# Patient Record
Sex: FEMALE | Race: WHITE | NOT HISPANIC OR LATINO
[De-identification: names, ages, dates, MRNs, and addresses within clinical notes are randomized per-mention and may not be internally consistent; named-entity substitution may affect disease eponyms.]

---

## 2017-02-09 ENCOUNTER — TRANSCRIPTION ENCOUNTER (OUTPATIENT)
Age: 72
End: 2017-02-09

## 2017-08-07 ENCOUNTER — TRANSCRIPTION ENCOUNTER (OUTPATIENT)
Age: 72
End: 2017-08-07

## 2017-11-19 ENCOUNTER — TRANSCRIPTION ENCOUNTER (OUTPATIENT)
Age: 72
End: 2017-11-19

## 2018-04-16 ENCOUNTER — TRANSCRIPTION ENCOUNTER (OUTPATIENT)
Age: 73
End: 2018-04-16

## 2018-04-24 ENCOUNTER — APPOINTMENT (OUTPATIENT)
Dept: UROLOGY | Facility: CLINIC | Age: 73
End: 2018-04-24
Payer: MEDICARE

## 2018-04-24 VITALS
TEMPERATURE: 98.7 F | WEIGHT: 290 LBS | SYSTOLIC BLOOD PRESSURE: 152 MMHG | BODY MASS INDEX: 45.52 KG/M2 | HEART RATE: 70 BPM | DIASTOLIC BLOOD PRESSURE: 77 MMHG | HEIGHT: 67 IN | OXYGEN SATURATION: 95 %

## 2018-04-24 PROCEDURE — 99204 OFFICE O/P NEW MOD 45 MIN: CPT

## 2018-05-03 ENCOUNTER — FORM ENCOUNTER (OUTPATIENT)
Age: 73
End: 2018-05-03

## 2018-05-04 ENCOUNTER — OUTPATIENT (OUTPATIENT)
Dept: OUTPATIENT SERVICES | Facility: HOSPITAL | Age: 73
LOS: 1 days | End: 2018-05-04
Payer: MEDICARE

## 2018-05-04 ENCOUNTER — APPOINTMENT (OUTPATIENT)
Dept: CT IMAGING | Facility: CLINIC | Age: 73
End: 2018-05-04
Payer: MEDICARE

## 2018-05-04 DIAGNOSIS — Z00.8 ENCOUNTER FOR OTHER GENERAL EXAMINATION: ICD-10-CM

## 2018-05-04 PROCEDURE — 74178 CT ABD&PLV WO CNTR FLWD CNTR: CPT

## 2018-05-04 PROCEDURE — 74178 CT ABD&PLV WO CNTR FLWD CNTR: CPT | Mod: 26

## 2018-05-14 ENCOUNTER — APPOINTMENT (OUTPATIENT)
Dept: UROLOGY | Facility: CLINIC | Age: 73
End: 2018-05-14
Payer: MEDICARE

## 2018-05-14 VITALS — SYSTOLIC BLOOD PRESSURE: 156 MMHG | DIASTOLIC BLOOD PRESSURE: 74 MMHG

## 2018-05-14 DIAGNOSIS — N32.9 BLADDER DISORDER, UNSPECIFIED: ICD-10-CM

## 2018-05-14 PROCEDURE — 52224 CYSTOSCOPY AND TREATMENT: CPT

## 2018-05-14 PROCEDURE — 81003 URINALYSIS AUTO W/O SCOPE: CPT | Mod: QW

## 2018-05-14 PROCEDURE — 99213 OFFICE O/P EST LOW 20 MIN: CPT | Mod: 25

## 2018-05-15 LAB
BILIRUB UR QL STRIP: NEGATIVE
CLARITY UR: CLEAR
COLLECTION METHOD: NORMAL
GLUCOSE UR-MCNC: NEGATIVE
HCG UR QL: 0.2 EU/DL
HGB UR QL STRIP.AUTO: ABNORMAL
KETONES UR-MCNC: NEGATIVE
LEUKOCYTE ESTERASE UR QL STRIP: NEGATIVE
NITRITE UR QL STRIP: NEGATIVE
PH UR STRIP: 5
PROT UR STRIP-MCNC: NEGATIVE
SP GR UR STRIP: <=1.005

## 2018-05-16 ENCOUNTER — APPOINTMENT (OUTPATIENT)
Dept: UROLOGY | Facility: CLINIC | Age: 73
End: 2018-05-16
Payer: MEDICARE

## 2018-05-16 VITALS — DIASTOLIC BLOOD PRESSURE: 69 MMHG | OXYGEN SATURATION: 95 % | SYSTOLIC BLOOD PRESSURE: 160 MMHG | TEMPERATURE: 98.2 F

## 2018-05-16 PROCEDURE — 99214 OFFICE O/P EST MOD 30 MIN: CPT

## 2018-05-19 LAB — CORE LAB BIOPSY: NORMAL

## 2018-05-21 ENCOUNTER — APPOINTMENT (OUTPATIENT)
Dept: UROLOGY | Facility: CLINIC | Age: 73
End: 2018-05-21
Payer: MEDICARE

## 2018-05-21 VITALS
BODY MASS INDEX: 43.79 KG/M2 | HEART RATE: 80 BPM | TEMPERATURE: 98.3 F | DIASTOLIC BLOOD PRESSURE: 82 MMHG | WEIGHT: 279 LBS | SYSTOLIC BLOOD PRESSURE: 136 MMHG | HEIGHT: 67 IN

## 2018-05-21 PROCEDURE — 51784 ANAL/URINARY MUSCLE STUDY: CPT

## 2018-05-21 PROCEDURE — 51797 INTRAABDOMINAL PRESSURE TEST: CPT

## 2018-05-21 PROCEDURE — 51741 ELECTRO-UROFLOWMETRY FIRST: CPT

## 2018-05-21 PROCEDURE — 51798 US URINE CAPACITY MEASURE: CPT | Mod: 59

## 2018-05-21 PROCEDURE — 51728 CYSTOMETROGRAM W/VP: CPT

## 2018-05-29 ENCOUNTER — OTHER (OUTPATIENT)
Age: 73
End: 2018-05-29

## 2018-06-05 ENCOUNTER — OUTPATIENT (OUTPATIENT)
Dept: OUTPATIENT SERVICES | Facility: HOSPITAL | Age: 73
LOS: 1 days | Discharge: HOME | End: 2018-06-05
Payer: COMMERCIAL

## 2018-06-05 DIAGNOSIS — N32.9 BLADDER DISORDER, UNSPECIFIED: ICD-10-CM

## 2018-06-05 PROCEDURE — 78707 K FLOW/FUNCT IMAGE W/O DRUG: CPT | Mod: 26

## 2018-06-05 RX ORDER — SODIUM CHLORIDE 9 MG/ML
999 INJECTION INTRAMUSCULAR; INTRAVENOUS; SUBCUTANEOUS ONCE
Qty: 0 | Refills: 0 | Status: COMPLETED | OUTPATIENT
Start: 2018-06-05 | End: 2018-06-05

## 2018-06-05 RX ADMIN — SODIUM CHLORIDE 999 MILLILITER(S): 9 INJECTION INTRAMUSCULAR; INTRAVENOUS; SUBCUTANEOUS at 10:45

## 2018-06-07 ENCOUNTER — OUTPATIENT (OUTPATIENT)
Dept: OUTPATIENT SERVICES | Facility: HOSPITAL | Age: 73
LOS: 1 days | Discharge: HOME | End: 2018-06-07
Payer: COMMERCIAL

## 2018-06-07 VITALS
DIASTOLIC BLOOD PRESSURE: 73 MMHG | WEIGHT: 272.05 LBS | SYSTOLIC BLOOD PRESSURE: 130 MMHG | RESPIRATION RATE: 15 BRPM | HEART RATE: 84 BPM | HEIGHT: 66.5 IN | TEMPERATURE: 98 F | OXYGEN SATURATION: 98 %

## 2018-06-07 DIAGNOSIS — N28.89 OTHER SPECIFIED DISORDERS OF KIDNEY AND URETER: ICD-10-CM

## 2018-06-07 DIAGNOSIS — Z96.641 PRESENCE OF RIGHT ARTIFICIAL HIP JOINT: Chronic | ICD-10-CM

## 2018-06-07 DIAGNOSIS — Z98.890 OTHER SPECIFIED POSTPROCEDURAL STATES: Chronic | ICD-10-CM

## 2018-06-07 LAB
ANION GAP SERPL CALC-SCNC: 7 MMOL/L — SIGNIFICANT CHANGE UP (ref 5–17)
APPEARANCE UR: CLEAR — SIGNIFICANT CHANGE UP
APTT BLD: 30.5 SEC — SIGNIFICANT CHANGE UP (ref 27.5–37.4)
BASOPHILS # BLD AUTO: 0.04 K/UL — SIGNIFICANT CHANGE UP (ref 0–0.2)
BASOPHILS NFR BLD AUTO: 0.4 % — SIGNIFICANT CHANGE UP (ref 0–2)
BILIRUB UR-MCNC: NEGATIVE — SIGNIFICANT CHANGE UP
BUN SERPL-MCNC: 30 MG/DL — HIGH (ref 7–23)
CALCIUM SERPL-MCNC: 9 MG/DL — SIGNIFICANT CHANGE UP (ref 8.5–10.1)
CHLORIDE SERPL-SCNC: 106 MMOL/L — SIGNIFICANT CHANGE UP (ref 96–108)
CO2 SERPL-SCNC: 26 MMOL/L — SIGNIFICANT CHANGE UP (ref 22–31)
COLOR SPEC: YELLOW — SIGNIFICANT CHANGE UP
CREAT SERPL-MCNC: 1.13 MG/DL — SIGNIFICANT CHANGE UP (ref 0.5–1.3)
DIFF PNL FLD: ABNORMAL
EOSINOPHIL # BLD AUTO: 0.16 K/UL — SIGNIFICANT CHANGE UP (ref 0–0.5)
EOSINOPHIL NFR BLD AUTO: 1.5 % — SIGNIFICANT CHANGE UP (ref 0–6)
GLUCOSE SERPL-MCNC: 115 MG/DL — HIGH (ref 70–99)
GLUCOSE UR QL: NEGATIVE MG/DL — SIGNIFICANT CHANGE UP
HCT VFR BLD CALC: 37.1 % — SIGNIFICANT CHANGE UP (ref 34.5–45)
HGB BLD-MCNC: 12.5 G/DL — SIGNIFICANT CHANGE UP (ref 11.5–15.5)
IMM GRANULOCYTES NFR BLD AUTO: 0.5 % — SIGNIFICANT CHANGE UP (ref 0–1.5)
INR BLD: 1.13 RATIO — SIGNIFICANT CHANGE UP (ref 0.88–1.16)
KETONES UR-MCNC: NEGATIVE — SIGNIFICANT CHANGE UP
LEUKOCYTE ESTERASE UR-ACNC: ABNORMAL
LYMPHOCYTES # BLD AUTO: 2.83 K/UL — SIGNIFICANT CHANGE UP (ref 1–3.3)
LYMPHOCYTES # BLD AUTO: 26.9 % — SIGNIFICANT CHANGE UP (ref 13–44)
MCHC RBC-ENTMCNC: 28.7 PG — SIGNIFICANT CHANGE UP (ref 27–34)
MCHC RBC-ENTMCNC: 33.7 GM/DL — SIGNIFICANT CHANGE UP (ref 32–36)
MCV RBC AUTO: 85.1 FL — SIGNIFICANT CHANGE UP (ref 80–100)
MONOCYTES # BLD AUTO: 0.59 K/UL — SIGNIFICANT CHANGE UP (ref 0–0.9)
MONOCYTES NFR BLD AUTO: 5.6 % — SIGNIFICANT CHANGE UP (ref 2–14)
NEUTROPHILS # BLD AUTO: 6.87 K/UL — SIGNIFICANT CHANGE UP (ref 1.8–7.4)
NEUTROPHILS NFR BLD AUTO: 65.1 % — SIGNIFICANT CHANGE UP (ref 43–77)
NITRITE UR-MCNC: NEGATIVE — SIGNIFICANT CHANGE UP
PH UR: 5 — SIGNIFICANT CHANGE UP (ref 5–8)
PLATELET # BLD AUTO: 303 K/UL — SIGNIFICANT CHANGE UP (ref 150–400)
POTASSIUM SERPL-MCNC: 3.7 MMOL/L — SIGNIFICANT CHANGE UP (ref 3.5–5.3)
POTASSIUM SERPL-SCNC: 3.7 MMOL/L — SIGNIFICANT CHANGE UP (ref 3.5–5.3)
PROT UR-MCNC: 15 MG/DL
PROTHROM AB SERPL-ACNC: 12.2 SEC — SIGNIFICANT CHANGE UP (ref 9.8–12.7)
RBC # BLD: 4.36 M/UL — SIGNIFICANT CHANGE UP (ref 3.8–5.2)
RBC # FLD: 14.2 % — SIGNIFICANT CHANGE UP (ref 10.3–14.5)
SODIUM SERPL-SCNC: 139 MMOL/L — SIGNIFICANT CHANGE UP (ref 135–145)
SP GR SPEC: 1.02 — SIGNIFICANT CHANGE UP (ref 1.01–1.02)
TYPE + AB SCN PNL BLD: SIGNIFICANT CHANGE UP
UROBILINOGEN FLD QL: NEGATIVE MG/DL — SIGNIFICANT CHANGE UP
WBC # BLD: 10.54 K/UL — HIGH (ref 3.8–10.5)
WBC # FLD AUTO: 10.54 K/UL — HIGH (ref 3.8–10.5)

## 2018-06-07 PROCEDURE — 93010 ELECTROCARDIOGRAM REPORT: CPT

## 2018-06-07 PROCEDURE — 71046 X-RAY EXAM CHEST 2 VIEWS: CPT | Mod: 26

## 2018-06-07 RX ORDER — CHOLECALCIFEROL (VITAMIN D3) 125 MCG
0 CAPSULE ORAL
Qty: 0 | Refills: 0 | COMMUNITY

## 2018-06-07 NOTE — H&P PST ADULT - FAMILY HISTORY
Mother  Still living? Yes, Estimated age:   Family history of cardiac pacemaker, Age at diagnosis: Age Unknown     Father  Still living? No  Family history of prostate cancer in father, Age at diagnosis: Age Unknown     Child  Still living? Yes, Estimated age: 41-50  Family history of cardiomyopathy, Age at diagnosis: Age Unknown

## 2018-06-07 NOTE — H&P PST ADULT - RESPIRATORY AND THORAX COMMENTS
Followed by Dr Guan for sleep apnea. States she needs to be reevaluated because she stopped using CPAP.

## 2018-06-07 NOTE — H&P PST ADULT - PMH
Arrhythmia  "irregular heartbeat" not sure of specific diagnosis  Essential hypertension    Hematuria    Kidney mass  left side  Obesity    Pure hypercholesterolemia    Sleep apnea  has CPAP, stopped using it  Type 2 diabetes mellitus

## 2018-06-07 NOTE — H&P PST ADULT - ASSESSMENT
yo scheduled for   with    on     1. Labs as per surgeon  2. EKG  3. Medical clearance with  4. discussed EZ sponges & day of procedure instructions 73 yo female scheduled for left nephrectomy on 6/14/18  with Dr. Rainey    1. Labs as per surgeon  2. EKG  3. Medical clearance @ Dr. Parsons's office  4. discussed EZ sponges & day of procedure instructions  5. CXR

## 2018-06-07 NOTE — H&P PST ADULT - HISTORY OF PRESENT ILLNESS
71 yo female presents to PST. Reports episode of gross hematuria in April 2018. Consulted with Dr Zhou & was sent for CT scan that showed mass on left kidney. Was then referred to DR Rainey who recommended surgery. Pt states "he's almost sure its cancer".  Denies back/flank pain, dysuria or hematuria at this time.

## 2018-06-08 LAB
CULTURE RESULTS: NO GROWTH — SIGNIFICANT CHANGE UP
SPECIMEN SOURCE: SIGNIFICANT CHANGE UP

## 2018-06-13 RX ORDER — FENTANYL CITRATE 50 UG/ML
50 INJECTION INTRAVENOUS
Qty: 0 | Refills: 0 | Status: DISCONTINUED | OUTPATIENT
Start: 2018-06-14 | End: 2018-06-14

## 2018-06-14 ENCOUNTER — RESULT REVIEW (OUTPATIENT)
Age: 73
End: 2018-06-14

## 2018-06-14 ENCOUNTER — INPATIENT (INPATIENT)
Facility: HOSPITAL | Age: 73
LOS: 1 days | Discharge: HOME | End: 2018-06-16
Attending: UROLOGY | Admitting: UROLOGY
Payer: COMMERCIAL

## 2018-06-14 ENCOUNTER — APPOINTMENT (OUTPATIENT)
Dept: UROLOGY | Facility: HOSPITAL | Age: 73
End: 2018-06-14

## 2018-06-14 VITALS
TEMPERATURE: 99 F | RESPIRATION RATE: 16 BRPM | WEIGHT: 272.05 LBS | HEIGHT: 67 IN | OXYGEN SATURATION: 97 % | HEART RATE: 81 BPM | SYSTOLIC BLOOD PRESSURE: 146 MMHG | DIASTOLIC BLOOD PRESSURE: 71 MMHG

## 2018-06-14 DIAGNOSIS — Z79.84 LONG TERM (CURRENT) USE OF ORAL HYPOGLYCEMIC DRUGS: ICD-10-CM

## 2018-06-14 DIAGNOSIS — M25.512 PAIN IN LEFT SHOULDER: ICD-10-CM

## 2018-06-14 DIAGNOSIS — N28.89 OTHER SPECIFIED DISORDERS OF KIDNEY AND URETER: ICD-10-CM

## 2018-06-14 DIAGNOSIS — E78.5 HYPERLIPIDEMIA, UNSPECIFIED: ICD-10-CM

## 2018-06-14 DIAGNOSIS — J43.2 CENTRILOBULAR EMPHYSEMA: ICD-10-CM

## 2018-06-14 DIAGNOSIS — Z79.82 LONG TERM (CURRENT) USE OF ASPIRIN: ICD-10-CM

## 2018-06-14 DIAGNOSIS — I65.23 OCCLUSION AND STENOSIS OF BILATERAL CAROTID ARTERIES: ICD-10-CM

## 2018-06-14 DIAGNOSIS — Z87.891 PERSONAL HISTORY OF NICOTINE DEPENDENCE: ICD-10-CM

## 2018-06-14 DIAGNOSIS — Z98.890 OTHER SPECIFIED POSTPROCEDURAL STATES: Chronic | ICD-10-CM

## 2018-06-14 DIAGNOSIS — D30.02 BENIGN NEOPLASM OF LEFT KIDNEY: ICD-10-CM

## 2018-06-14 DIAGNOSIS — M19.90 UNSPECIFIED OSTEOARTHRITIS, UNSPECIFIED SITE: ICD-10-CM

## 2018-06-14 DIAGNOSIS — E11.40 TYPE 2 DIABETES MELLITUS WITH DIABETIC NEUROPATHY, UNSPECIFIED: ICD-10-CM

## 2018-06-14 DIAGNOSIS — G47.33 OBSTRUCTIVE SLEEP APNEA (ADULT) (PEDIATRIC): ICD-10-CM

## 2018-06-14 DIAGNOSIS — I10 ESSENTIAL (PRIMARY) HYPERTENSION: ICD-10-CM

## 2018-06-14 DIAGNOSIS — Z96.641 PRESENCE OF RIGHT ARTIFICIAL HIP JOINT: Chronic | ICD-10-CM

## 2018-06-14 LAB
ANION GAP SERPL CALC-SCNC: 10 MMOL/L — SIGNIFICANT CHANGE UP (ref 5–17)
BASOPHILS # BLD AUTO: 0.01 K/UL — SIGNIFICANT CHANGE UP (ref 0–0.2)
BASOPHILS NFR BLD AUTO: 0.1 % — SIGNIFICANT CHANGE UP (ref 0–2)
BUN SERPL-MCNC: 33 MG/DL — HIGH (ref 7–23)
CALCIUM SERPL-MCNC: 8.3 MG/DL — LOW (ref 8.5–10.1)
CHLORIDE SERPL-SCNC: 107 MMOL/L — SIGNIFICANT CHANGE UP (ref 96–108)
CO2 SERPL-SCNC: 23 MMOL/L — SIGNIFICANT CHANGE UP (ref 22–31)
CREAT SERPL-MCNC: 1.42 MG/DL — HIGH (ref 0.5–1.3)
EOSINOPHIL # BLD AUTO: 0.01 K/UL — SIGNIFICANT CHANGE UP (ref 0–0.5)
EOSINOPHIL NFR BLD AUTO: 0.1 % — SIGNIFICANT CHANGE UP (ref 0–6)
GLUCOSE BLDC GLUCOMTR-MCNC: 225 MG/DL — HIGH (ref 70–99)
GLUCOSE SERPL-MCNC: 192 MG/DL — HIGH (ref 70–99)
HCT VFR BLD CALC: 35.2 % — SIGNIFICANT CHANGE UP (ref 34.5–45)
HGB BLD-MCNC: 11.9 G/DL — SIGNIFICANT CHANGE UP (ref 11.5–15.5)
IMM GRANULOCYTES NFR BLD AUTO: 1.1 % — SIGNIFICANT CHANGE UP (ref 0–1.5)
LYMPHOCYTES # BLD AUTO: 0.6 K/UL — LOW (ref 1–3.3)
LYMPHOCYTES # BLD AUTO: 5.2 % — LOW (ref 13–44)
MCHC RBC-ENTMCNC: 29.1 PG — SIGNIFICANT CHANGE UP (ref 27–34)
MCHC RBC-ENTMCNC: 33.8 GM/DL — SIGNIFICANT CHANGE UP (ref 32–36)
MCV RBC AUTO: 86.1 FL — SIGNIFICANT CHANGE UP (ref 80–100)
MONOCYTES # BLD AUTO: 0.12 K/UL — SIGNIFICANT CHANGE UP (ref 0–0.9)
MONOCYTES NFR BLD AUTO: 1 % — LOW (ref 2–14)
NEUTROPHILS # BLD AUTO: 10.74 K/UL — HIGH (ref 1.8–7.4)
NEUTROPHILS NFR BLD AUTO: 92.5 % — HIGH (ref 43–77)
NRBC # BLD: 0 /100 WBCS — SIGNIFICANT CHANGE UP (ref 0–0)
PLATELET # BLD AUTO: 242 K/UL — SIGNIFICANT CHANGE UP (ref 150–400)
POTASSIUM SERPL-MCNC: 3.7 MMOL/L — SIGNIFICANT CHANGE UP (ref 3.5–5.3)
POTASSIUM SERPL-SCNC: 3.7 MMOL/L — SIGNIFICANT CHANGE UP (ref 3.5–5.3)
RBC # BLD: 4.09 M/UL — SIGNIFICANT CHANGE UP (ref 3.8–5.2)
RBC # FLD: 14.3 % — SIGNIFICANT CHANGE UP (ref 10.3–14.5)
SODIUM SERPL-SCNC: 140 MMOL/L — SIGNIFICANT CHANGE UP (ref 135–145)
WBC # BLD: 11.61 K/UL — HIGH (ref 3.8–10.5)
WBC # FLD AUTO: 11.61 K/UL — HIGH (ref 3.8–10.5)

## 2018-06-14 PROCEDURE — 76998 US GUIDE INTRAOP: CPT | Mod: 26

## 2018-06-14 PROCEDURE — 50543 LAPARO PARTIAL NEPHRECTOMY: CPT | Mod: LT

## 2018-06-14 PROCEDURE — 88307 TISSUE EXAM BY PATHOLOGIST: CPT | Mod: 26

## 2018-06-14 RX ORDER — DEXTROSE 50 % IN WATER 50 %
15 SYRINGE (ML) INTRAVENOUS ONCE
Qty: 0 | Refills: 0 | Status: DISCONTINUED | OUTPATIENT
Start: 2018-06-14 | End: 2018-06-16

## 2018-06-14 RX ORDER — SODIUM CHLORIDE 9 MG/ML
1000 INJECTION INTRAMUSCULAR; INTRAVENOUS; SUBCUTANEOUS
Qty: 0 | Refills: 0 | Status: DISCONTINUED | OUTPATIENT
Start: 2018-06-14 | End: 2018-06-16

## 2018-06-14 RX ORDER — DEXTROSE 50 % IN WATER 50 %
25 SYRINGE (ML) INTRAVENOUS ONCE
Qty: 0 | Refills: 0 | Status: DISCONTINUED | OUTPATIENT
Start: 2018-06-14 | End: 2018-06-16

## 2018-06-14 RX ORDER — MORPHINE SULFATE 50 MG/1
4 CAPSULE, EXTENDED RELEASE ORAL EVERY 4 HOURS
Qty: 0 | Refills: 0 | Status: DISCONTINUED | OUTPATIENT
Start: 2018-06-14 | End: 2018-06-16

## 2018-06-14 RX ORDER — MEPERIDINE HYDROCHLORIDE 50 MG/ML
12.5 INJECTION INTRAMUSCULAR; INTRAVENOUS; SUBCUTANEOUS
Qty: 0 | Refills: 0 | Status: DISCONTINUED | OUTPATIENT
Start: 2018-06-14 | End: 2018-06-14

## 2018-06-14 RX ORDER — INSULIN LISPRO 100/ML
VIAL (ML) SUBCUTANEOUS
Qty: 0 | Refills: 0 | Status: DISCONTINUED | OUTPATIENT
Start: 2018-06-14 | End: 2018-06-16

## 2018-06-14 RX ORDER — ASPIRIN/CALCIUM CARB/MAGNESIUM 324 MG
81 TABLET ORAL DAILY
Qty: 0 | Refills: 0 | Status: DISCONTINUED | OUTPATIENT
Start: 2018-06-14 | End: 2018-06-16

## 2018-06-14 RX ORDER — VALSARTAN 80 MG/1
160 TABLET ORAL DAILY
Qty: 0 | Refills: 0 | Status: DISCONTINUED | OUTPATIENT
Start: 2018-06-14 | End: 2018-06-16

## 2018-06-14 RX ORDER — HYDROCHLOROTHIAZIDE 25 MG
25 TABLET ORAL DAILY
Qty: 0 | Refills: 0 | Status: DISCONTINUED | OUTPATIENT
Start: 2018-06-14 | End: 2018-06-16

## 2018-06-14 RX ORDER — CEFAZOLIN SODIUM 1 G
2000 VIAL (EA) INJECTION EVERY 8 HOURS
Qty: 0 | Refills: 0 | Status: COMPLETED | OUTPATIENT
Start: 2018-06-14 | End: 2018-06-15

## 2018-06-14 RX ORDER — ACETAMINOPHEN 500 MG
650 TABLET ORAL EVERY 6 HOURS
Qty: 0 | Refills: 0 | Status: DISCONTINUED | OUTPATIENT
Start: 2018-06-14 | End: 2018-06-16

## 2018-06-14 RX ORDER — MORPHINE SULFATE 50 MG/1
4 CAPSULE, EXTENDED RELEASE ORAL
Qty: 0 | Refills: 0 | Status: DISCONTINUED | OUTPATIENT
Start: 2018-06-14 | End: 2018-06-14

## 2018-06-14 RX ORDER — MORPHINE SULFATE 50 MG/1
2 CAPSULE, EXTENDED RELEASE ORAL EVERY 4 HOURS
Qty: 0 | Refills: 0 | Status: DISCONTINUED | OUTPATIENT
Start: 2018-06-14 | End: 2018-06-16

## 2018-06-14 RX ORDER — GLUCAGON INJECTION, SOLUTION 0.5 MG/.1ML
1 INJECTION, SOLUTION SUBCUTANEOUS ONCE
Qty: 0 | Refills: 0 | Status: DISCONTINUED | OUTPATIENT
Start: 2018-06-14 | End: 2018-06-16

## 2018-06-14 RX ORDER — SODIUM CHLORIDE 9 MG/ML
1000 INJECTION, SOLUTION INTRAVENOUS
Qty: 0 | Refills: 0 | Status: DISCONTINUED | OUTPATIENT
Start: 2018-06-14 | End: 2018-06-14

## 2018-06-14 RX ORDER — HEPARIN SODIUM 5000 [USP'U]/ML
5000 INJECTION INTRAVENOUS; SUBCUTANEOUS EVERY 8 HOURS
Qty: 0 | Refills: 0 | Status: DISCONTINUED | OUTPATIENT
Start: 2018-06-14 | End: 2018-06-16

## 2018-06-14 RX ORDER — ONDANSETRON 8 MG/1
4 TABLET, FILM COATED ORAL ONCE
Qty: 0 | Refills: 0 | Status: DISCONTINUED | OUTPATIENT
Start: 2018-06-14 | End: 2018-06-14

## 2018-06-14 RX ORDER — DEXTROSE 50 % IN WATER 50 %
12.5 SYRINGE (ML) INTRAVENOUS ONCE
Qty: 0 | Refills: 0 | Status: DISCONTINUED | OUTPATIENT
Start: 2018-06-14 | End: 2018-06-16

## 2018-06-14 RX ORDER — ATORVASTATIN CALCIUM 80 MG/1
40 TABLET, FILM COATED ORAL AT BEDTIME
Qty: 0 | Refills: 0 | Status: DISCONTINUED | OUTPATIENT
Start: 2018-06-14 | End: 2018-06-16

## 2018-06-14 RX ORDER — SODIUM CHLORIDE 9 MG/ML
1000 INJECTION, SOLUTION INTRAVENOUS
Qty: 0 | Refills: 0 | Status: DISCONTINUED | OUTPATIENT
Start: 2018-06-14 | End: 2018-06-16

## 2018-06-14 RX ADMIN — SODIUM CHLORIDE 75 MILLILITER(S): 9 INJECTION, SOLUTION INTRAVENOUS at 17:05

## 2018-06-14 RX ADMIN — HEPARIN SODIUM 5000 UNIT(S): 5000 INJECTION INTRAVENOUS; SUBCUTANEOUS at 21:18

## 2018-06-14 RX ADMIN — FENTANYL CITRATE 50 MICROGRAM(S): 50 INJECTION INTRAVENOUS at 17:40

## 2018-06-14 RX ADMIN — SODIUM CHLORIDE 125 MILLILITER(S): 9 INJECTION INTRAMUSCULAR; INTRAVENOUS; SUBCUTANEOUS at 22:30

## 2018-06-14 RX ADMIN — FENTANYL CITRATE 50 MICROGRAM(S): 50 INJECTION INTRAVENOUS at 17:17

## 2018-06-14 RX ADMIN — ATORVASTATIN CALCIUM 40 MILLIGRAM(S): 80 TABLET, FILM COATED ORAL at 21:18

## 2018-06-14 RX ADMIN — Medication 100 MILLIGRAM(S): at 21:19

## 2018-06-14 RX ADMIN — FENTANYL CITRATE 50 MICROGRAM(S): 50 INJECTION INTRAVENOUS at 17:04

## 2018-06-14 RX ADMIN — FENTANYL CITRATE 50 MICROGRAM(S): 50 INJECTION INTRAVENOUS at 17:30

## 2018-06-14 RX ADMIN — FENTANYL CITRATE 50 MICROGRAM(S): 50 INJECTION INTRAVENOUS at 17:15

## 2018-06-14 RX ADMIN — FENTANYL CITRATE 50 MICROGRAM(S): 50 INJECTION INTRAVENOUS at 17:20

## 2018-06-14 RX ADMIN — Medication 650 MILLIGRAM(S): at 21:03

## 2018-06-14 RX ADMIN — SODIUM CHLORIDE 125 MILLILITER(S): 9 INJECTION INTRAMUSCULAR; INTRAVENOUS; SUBCUTANEOUS at 17:32

## 2018-06-14 RX ADMIN — Medication 650 MILLIGRAM(S): at 22:03

## 2018-06-14 NOTE — BRIEF OPERATIVE NOTE - PROCEDURE
<<-----Click on this checkbox to enter Procedure Partial nephrectomy  06/14/2018  Robotically assisted left partial nephrectomy with intraoperative ultrasonography  Active  KNORMAN

## 2018-06-14 NOTE — PROGRESS NOTE ADULT - SUBJECTIVE AND OBJECTIVE BOX
Post Op Check:    72 year old female with left renal mass POD 0 s/p robotic left partial nephrectomy presents for POC, complains of abdominal discomfort. Otherwise no complaints.  Denies fevers, chills, SOB, CP, NV.    ICU Vital Signs Last 24 Hrs  T(C): 36.9 (14 Jun 2018 19:00), Max: 37.1 (14 Jun 2018 11:03)  T(F): 98.5 (14 Jun 2018 19:00), Max: 98.7 (14 Jun 2018 11:03)  HR: 9 (14 Jun 2018 20:00) (9 - 86)  BP: 140/76 (14 Jun 2018 20:00) (113/56 - 146/71)  BP(mean): --  ABP: --  ABP(mean): --  RR: 14 (14 Jun 2018 20:00) (12 - 16)  SpO2: 97% (14 Jun 2018 20:00) (97% - 100%)    Rose: 550cc clear urine  MATILDE: 100cc dark bloody drainage    Gen: NAD, comfortable in bed  Lungs: CTAB  Heart: S1/S2  Abd: soft, ND. + incisional tenderness, +BS. incisions c.d.i. MATILDE in place  : rose in place  Neuro: A&Ox3  Extremities: no edema, venodynes in place    72 year old female with left renal mass POD 0 s/p robotic left partial nephrectomy, currently stable  - continue current treatment plan  - NPO over night  - IVF  - f/u labs  - incentive spirometry  - pain control  - continue rose  - will continue to monitor

## 2018-06-15 LAB
ANION GAP SERPL CALC-SCNC: 10 MMOL/L — SIGNIFICANT CHANGE UP (ref 5–17)
BASOPHILS # BLD AUTO: 0.01 K/UL — SIGNIFICANT CHANGE UP (ref 0–0.2)
BASOPHILS NFR BLD AUTO: 0.1 % — SIGNIFICANT CHANGE UP (ref 0–2)
BUN SERPL-MCNC: 32 MG/DL — HIGH (ref 7–23)
CALCIUM SERPL-MCNC: 8 MG/DL — LOW (ref 8.5–10.1)
CHLORIDE SERPL-SCNC: 108 MMOL/L — SIGNIFICANT CHANGE UP (ref 96–108)
CO2 SERPL-SCNC: 23 MMOL/L — SIGNIFICANT CHANGE UP (ref 22–31)
CREAT SERPL-MCNC: 1.57 MG/DL — HIGH (ref 0.5–1.3)
EOSINOPHIL # BLD AUTO: 0 K/UL — SIGNIFICANT CHANGE UP (ref 0–0.5)
EOSINOPHIL NFR BLD AUTO: 0 % — SIGNIFICANT CHANGE UP (ref 0–6)
GLUCOSE BLDC GLUCOMTR-MCNC: 147 MG/DL — HIGH (ref 70–99)
GLUCOSE BLDC GLUCOMTR-MCNC: 174 MG/DL — HIGH (ref 70–99)
GLUCOSE BLDC GLUCOMTR-MCNC: 182 MG/DL — HIGH (ref 70–99)
GLUCOSE BLDC GLUCOMTR-MCNC: 207 MG/DL — HIGH (ref 70–99)
GLUCOSE SERPL-MCNC: 179 MG/DL — HIGH (ref 70–99)
HBA1C BLD-MCNC: 6.8 % — HIGH (ref 4–5.6)
HCT VFR BLD CALC: 35.3 % — SIGNIFICANT CHANGE UP (ref 34.5–45)
HGB BLD-MCNC: 11.8 G/DL — SIGNIFICANT CHANGE UP (ref 11.5–15.5)
IMM GRANULOCYTES NFR BLD AUTO: 0.8 % — SIGNIFICANT CHANGE UP (ref 0–1.5)
LYMPHOCYTES # BLD AUTO: 0.84 K/UL — LOW (ref 1–3.3)
LYMPHOCYTES # BLD AUTO: 5.6 % — LOW (ref 13–44)
MCHC RBC-ENTMCNC: 28.6 PG — SIGNIFICANT CHANGE UP (ref 27–34)
MCHC RBC-ENTMCNC: 33.4 GM/DL — SIGNIFICANT CHANGE UP (ref 32–36)
MCV RBC AUTO: 85.5 FL — SIGNIFICANT CHANGE UP (ref 80–100)
MONOCYTES # BLD AUTO: 0.64 K/UL — SIGNIFICANT CHANGE UP (ref 0–0.9)
MONOCYTES NFR BLD AUTO: 4.3 % — SIGNIFICANT CHANGE UP (ref 2–14)
NEUTROPHILS # BLD AUTO: 13.41 K/UL — HIGH (ref 1.8–7.4)
NEUTROPHILS NFR BLD AUTO: 89.2 % — HIGH (ref 43–77)
NRBC # BLD: 0 /100 WBCS — SIGNIFICANT CHANGE UP (ref 0–0)
PLATELET # BLD AUTO: 319 K/UL — SIGNIFICANT CHANGE UP (ref 150–400)
POTASSIUM SERPL-MCNC: 4 MMOL/L — SIGNIFICANT CHANGE UP (ref 3.5–5.3)
POTASSIUM SERPL-SCNC: 4 MMOL/L — SIGNIFICANT CHANGE UP (ref 3.5–5.3)
RBC # BLD: 4.13 M/UL — SIGNIFICANT CHANGE UP (ref 3.8–5.2)
RBC # FLD: 14.7 % — HIGH (ref 10.3–14.5)
SODIUM SERPL-SCNC: 141 MMOL/L — SIGNIFICANT CHANGE UP (ref 135–145)
WBC # BLD: 15.02 K/UL — HIGH (ref 3.8–10.5)
WBC # FLD AUTO: 15.02 K/UL — HIGH (ref 3.8–10.5)

## 2018-06-15 PROCEDURE — 99233 SBSQ HOSP IP/OBS HIGH 50: CPT

## 2018-06-15 RX ADMIN — Medication 1: at 06:12

## 2018-06-15 RX ADMIN — HEPARIN SODIUM 5000 UNIT(S): 5000 INJECTION INTRAVENOUS; SUBCUTANEOUS at 05:47

## 2018-06-15 RX ADMIN — HEPARIN SODIUM 5000 UNIT(S): 5000 INJECTION INTRAVENOUS; SUBCUTANEOUS at 21:34

## 2018-06-15 RX ADMIN — HEPARIN SODIUM 5000 UNIT(S): 5000 INJECTION INTRAVENOUS; SUBCUTANEOUS at 13:38

## 2018-06-15 RX ADMIN — MORPHINE SULFATE 2 MILLIGRAM(S): 50 CAPSULE, EXTENDED RELEASE ORAL at 06:37

## 2018-06-15 RX ADMIN — SODIUM CHLORIDE 75 MILLILITER(S): 9 INJECTION INTRAMUSCULAR; INTRAVENOUS; SUBCUTANEOUS at 20:01

## 2018-06-15 RX ADMIN — SODIUM CHLORIDE 125 MILLILITER(S): 9 INJECTION INTRAMUSCULAR; INTRAVENOUS; SUBCUTANEOUS at 05:48

## 2018-06-15 RX ADMIN — SODIUM CHLORIDE 125 MILLILITER(S): 9 INJECTION INTRAMUSCULAR; INTRAVENOUS; SUBCUTANEOUS at 15:05

## 2018-06-15 RX ADMIN — VALSARTAN 160 MILLIGRAM(S): 80 TABLET ORAL at 05:47

## 2018-06-15 RX ADMIN — Medication 81 MILLIGRAM(S): at 11:17

## 2018-06-15 RX ADMIN — Medication 100 MILLIGRAM(S): at 05:47

## 2018-06-15 RX ADMIN — ATORVASTATIN CALCIUM 40 MILLIGRAM(S): 80 TABLET, FILM COATED ORAL at 21:34

## 2018-06-15 RX ADMIN — Medication 1: at 11:57

## 2018-06-15 RX ADMIN — MORPHINE SULFATE 2 MILLIGRAM(S): 50 CAPSULE, EXTENDED RELEASE ORAL at 06:52

## 2018-06-15 RX ADMIN — Medication 25 MILLIGRAM(S): at 05:47

## 2018-06-15 NOTE — PROGRESS NOTE ADULT - SUBJECTIVE AND OBJECTIVE BOX
Pt seen at 0915    Pt is a 72 year old female with left renal mass POD #1  S/P  robotic left partial nephrectomy. Pt with mild Abd pain, + Lt shoulder pain. Denies N/V, CP, Dyspnea. + Flatus, no BM    Vital Signs Last 24 Hrs  T(C): 36.9 (15 José 2018 05:10), Max: 37 (15 José 2018 03:00)  T(F): 98.4 (15 José 2018 05:10), Max: 98.6 (15 José 2018 03:00)  HR: 80 (15 José 2018 05:10) (9 - 92)  BP: 111/54 (15 José 2018 05:10) (105/43 - 144/54)  BP(mean): --  RR: 18 (15 José 2018 05:10) (12 - 18)  SpO2: 95% (15 José 2018 05:10) (92% - 100%)      Woodruff: 1250 clear urine  MATILDE: 150 serosanguinous                          11.8   15.02 )-----------( 319      ( 15 José 2018 06:37 )             35.3     06-15    141  |  108  |  32<H>  ----------------------------<  179<H>  4.0   |  23  |  1.57<H>    Ca    8.0<L>      15 José 2018 06:37           Lungs: CTA B/L    Heart: S1, S2 RRR    Abd:  +BS, soft, ND. + incisional tenderness, incisions with dermabons. no erythema or exudates     MATILDE in place  Woodruff: draining clear yellow urine     Extremities: no edema,NT,  venodynes in place       A/P:  -encourage ambulation  -clear liquids advance to regular/diabetic as tolerated  - IVF  - incentive spirometry  - pain control  -Am Labs  -Above as discussed with Dr Rainey             Electronic Signatures:  Trina Ruiz (PA)  (Signed 14-Jun-2018 20:54)  	Authored: Progress Note, Subjective and Objective      Last Updated: 14-Jun-2018 20:54 by Trina Ruiz (PA)

## 2018-06-15 NOTE — PROVIDER CONTACT NOTE (OTHER) - SITUATION
Pt. due subq heparin, had partial left nephrectomy today, pt. has no complications, labs were drawn in PST on 6/7 plt= greater than 300

## 2018-06-16 ENCOUNTER — TRANSCRIPTION ENCOUNTER (OUTPATIENT)
Age: 73
End: 2018-06-16

## 2018-06-16 VITALS
RESPIRATION RATE: 17 BRPM | SYSTOLIC BLOOD PRESSURE: 120 MMHG | HEART RATE: 71 BPM | DIASTOLIC BLOOD PRESSURE: 51 MMHG | OXYGEN SATURATION: 94 % | TEMPERATURE: 98 F

## 2018-06-16 LAB
ANION GAP SERPL CALC-SCNC: 7 MMOL/L — SIGNIFICANT CHANGE UP (ref 5–17)
BUN SERPL-MCNC: 30 MG/DL — HIGH (ref 7–23)
CALCIUM SERPL-MCNC: 8.1 MG/DL — LOW (ref 8.5–10.1)
CHLORIDE SERPL-SCNC: 109 MMOL/L — HIGH (ref 96–108)
CO2 SERPL-SCNC: 25 MMOL/L — SIGNIFICANT CHANGE UP (ref 22–31)
CREAT SERPL-MCNC: 1.4 MG/DL — HIGH (ref 0.5–1.3)
GLUCOSE BLDC GLUCOMTR-MCNC: 147 MG/DL — HIGH (ref 70–99)
GLUCOSE BLDC GLUCOMTR-MCNC: 152 MG/DL — HIGH (ref 70–99)
GLUCOSE SERPL-MCNC: 134 MG/DL — HIGH (ref 70–99)
HCT VFR BLD CALC: 31.2 % — LOW (ref 34.5–45)
HGB BLD-MCNC: 10 G/DL — LOW (ref 11.5–15.5)
MCHC RBC-ENTMCNC: 28.4 PG — SIGNIFICANT CHANGE UP (ref 27–34)
MCHC RBC-ENTMCNC: 32.1 GM/DL — SIGNIFICANT CHANGE UP (ref 32–36)
MCV RBC AUTO: 88.6 FL — SIGNIFICANT CHANGE UP (ref 80–100)
NRBC # BLD: 0 /100 WBCS — SIGNIFICANT CHANGE UP (ref 0–0)
PLATELET # BLD AUTO: 215 K/UL — SIGNIFICANT CHANGE UP (ref 150–400)
POTASSIUM SERPL-MCNC: 3.6 MMOL/L — SIGNIFICANT CHANGE UP (ref 3.5–5.3)
POTASSIUM SERPL-SCNC: 3.6 MMOL/L — SIGNIFICANT CHANGE UP (ref 3.5–5.3)
RBC # BLD: 3.52 M/UL — LOW (ref 3.8–5.2)
RBC # FLD: 15.1 % — HIGH (ref 10.3–14.5)
SODIUM SERPL-SCNC: 141 MMOL/L — SIGNIFICANT CHANGE UP (ref 135–145)
WBC # BLD: 11.39 K/UL — HIGH (ref 3.8–10.5)
WBC # FLD AUTO: 11.39 K/UL — HIGH (ref 3.8–10.5)

## 2018-06-16 PROCEDURE — 99239 HOSP IP/OBS DSCHRG MGMT >30: CPT

## 2018-06-16 PROCEDURE — 99024 POSTOP FOLLOW-UP VISIT: CPT

## 2018-06-16 RX ORDER — OXYCODONE HYDROCHLORIDE 5 MG/1
1 TABLET ORAL
Qty: 20 | Refills: 0 | OUTPATIENT
Start: 2018-06-16

## 2018-06-16 RX ORDER — VALSARTAN 80 MG/1
1 TABLET ORAL
Qty: 0 | Refills: 0 | COMMUNITY
Start: 2018-06-16

## 2018-06-16 RX ORDER — ACETAMINOPHEN 500 MG
2 TABLET ORAL
Qty: 0 | Refills: 0 | COMMUNITY
Start: 2018-06-16

## 2018-06-16 RX ADMIN — HEPARIN SODIUM 5000 UNIT(S): 5000 INJECTION INTRAVENOUS; SUBCUTANEOUS at 05:24

## 2018-06-16 RX ADMIN — Medication 81 MILLIGRAM(S): at 11:54

## 2018-06-16 RX ADMIN — Medication 1: at 08:00

## 2018-06-16 NOTE — CHART NOTE - NSCHARTNOTEFT_GEN_A_CORE
Post-op day 2 from Robotic Left partial nephrectomy.  Pt c/o mild abd pain.  +flatus, no BM.    LABS             10.0   11.39 )-----------( 215      ( 16 Jun 2018 06:04 )             31.2     06-16    141  |  109<H>  |  30<H>  ----------------------------<  134<H>  3.6   |  25  |  1.40<H>    Ca    8.1<L>      16 Jun 2018 06:04      PE-  Abd-MATILDE in place, serous-sanguinous (minimal), non-distended, incisional tenderness only. +BS    Procedure- d/c MATILDE drain, DSD applied.    Plan-  d/c home  f/u Dr. Rainey 2 weeks  pain meds sent to pharmacy  encourage ambulation and IS    d/w Dr. Rainey.

## 2018-06-16 NOTE — DISCHARGE NOTE ADULT - PATIENT PORTAL LINK FT
You can access the [facility] Patient Portal, offered by [facility], by registering with the following website: [email or web address]

## 2018-06-16 NOTE — DISCHARGE NOTE ADULT - MEDICATION SUMMARY - MEDICATIONS TO TAKE
I will START or STAY ON the medications listed below when I get home from the hospital:    acetaminophen 325 mg oral tablet  -- 2 tab(s) by mouth every 6 hours, As needed, Mild Pain (1 - 3)  -- Indication: For pain/headache/fever    oxyCODONE 5 mg oral capsule  -- 1 cap(s) by mouth every 4 hours prn pain MDD#6  -- Caution federal law prohibits the transfer of this drug to any person other  than the person for whom it was prescribed.  It is very important that you take or use this exactly as directed.  Do not skip doses or discontinue unless directed by your doctor.  May cause drowsiness.  Alcohol may intensify this effect.  Use care when operating dangerous machinery.  This prescription cannot be refilled.  Using more of this medication than prescribed may cause serious breathing problems.    -- Indication: For post-op pain    aspirin 81 mg oral delayed release tablet  -- 1 tab(s) by mouth once a day  -- Indication: For per pmd    valsartan 160 mg oral tablet  -- 1 tab(s) by mouth once a day  -- Indication: For per pmd    metFORMIN 500 mg oral tablet  -- orally 4 times a day  -- Indication: For per pmd    atorvastatin 40 mg oral tablet  -- 1 tab(s) by mouth once a day  -- Indication: For per pmd    valsartan-hydrochlorothiazide 160mg-25mg oral tablet  -- 1 tab(s) by mouth once a day  -- Indication: For per pmd    Vitamin D2 50,000 intl units (1.25 mg) oral capsule  -- 1 cap(s) by mouth once a week  -- Indication: For per pmd

## 2018-06-16 NOTE — DISCHARGE NOTE ADULT - CARE PLAN
Principal Discharge DX:	Kidney mass  Goal:	return to ADLs  Assessment and plan of treatment:	return to ADLs

## 2018-06-16 NOTE — DISCHARGE NOTE ADULT - HOSPITAL COURSE
72F admitted s/p robotic left partial nephrectomy for renal mass.  Post-op course uneventful and pt stable for d/c home.  MATILDE drain removed, dressing applied, can be removed tomorrow, shower and pat dry.  f/u Dr. Rainey in 2 weeks.

## 2018-06-20 LAB — SURGICAL PATHOLOGY FINAL REPORT - CH: SIGNIFICANT CHANGE UP

## 2018-06-27 ENCOUNTER — APPOINTMENT (OUTPATIENT)
Dept: UROLOGY | Facility: CLINIC | Age: 73
End: 2018-06-27
Payer: MEDICARE

## 2018-06-27 VITALS — HEART RATE: 77 BPM | SYSTOLIC BLOOD PRESSURE: 133 MMHG | TEMPERATURE: 98.2 F | DIASTOLIC BLOOD PRESSURE: 84 MMHG

## 2018-06-27 DIAGNOSIS — D30.02 BENIGN NEOPLASM OF LEFT KIDNEY: ICD-10-CM

## 2018-06-27 PROCEDURE — 99024 POSTOP FOLLOW-UP VISIT: CPT

## 2018-07-09 ENCOUNTER — MESSAGE (OUTPATIENT)
Age: 73
End: 2018-07-09

## 2018-07-18 NOTE — CHART NOTE - NSCHARTNOTEFT_GEN_A_CORE
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   I agree with this diagnosis per path report  the findings are consistent with the preop diagnosis of renal mass where renal cell CA and oncocytoma were in the differential diagnosis

## 2018-08-03 PROBLEM — N28.89 OTHER SPECIFIED DISORDERS OF KIDNEY AND URETER: Chronic | Status: ACTIVE | Noted: 2018-06-07

## 2018-08-03 PROBLEM — G47.30 SLEEP APNEA, UNSPECIFIED: Chronic | Status: ACTIVE | Noted: 2018-06-07

## 2018-08-03 PROBLEM — E11.9 TYPE 2 DIABETES MELLITUS WITHOUT COMPLICATIONS: Chronic | Status: ACTIVE | Noted: 2018-06-07

## 2018-08-03 PROBLEM — I10 ESSENTIAL (PRIMARY) HYPERTENSION: Chronic | Status: ACTIVE | Noted: 2018-06-07

## 2018-08-03 PROBLEM — I49.9 CARDIAC ARRHYTHMIA, UNSPECIFIED: Chronic | Status: ACTIVE | Noted: 2018-06-07

## 2018-08-03 PROBLEM — R31.9 HEMATURIA, UNSPECIFIED: Chronic | Status: ACTIVE | Noted: 2018-06-07

## 2018-08-03 PROBLEM — E66.9 OBESITY, UNSPECIFIED: Chronic | Status: ACTIVE | Noted: 2018-06-07

## 2018-08-03 PROBLEM — E78.00 PURE HYPERCHOLESTEROLEMIA, UNSPECIFIED: Chronic | Status: ACTIVE | Noted: 2018-06-07

## 2018-08-29 ENCOUNTER — OUTPATIENT (OUTPATIENT)
Dept: OUTPATIENT SERVICES | Facility: HOSPITAL | Age: 73
LOS: 1 days | Discharge: HOME | End: 2018-08-29
Payer: COMMERCIAL

## 2018-08-29 DIAGNOSIS — Z96.641 PRESENCE OF RIGHT ARTIFICIAL HIP JOINT: Chronic | ICD-10-CM

## 2018-08-29 DIAGNOSIS — D30.02 BENIGN NEOPLASM OF LEFT KIDNEY: ICD-10-CM

## 2018-08-29 DIAGNOSIS — Z98.890 OTHER SPECIFIED POSTPROCEDURAL STATES: Chronic | ICD-10-CM

## 2018-08-29 PROCEDURE — 78707 K FLOW/FUNCT IMAGE W/O DRUG: CPT | Mod: 26

## 2018-08-29 RX ORDER — SODIUM CHLORIDE 9 MG/ML
1000 INJECTION INTRAMUSCULAR; INTRAVENOUS; SUBCUTANEOUS ONCE
Qty: 0 | Refills: 0 | Status: COMPLETED | OUTPATIENT
Start: 2018-08-29 | End: 2018-08-29

## 2018-08-29 RX ORDER — FUROSEMIDE 40 MG
40 TABLET ORAL ONCE
Qty: 0 | Refills: 0 | Status: DISCONTINUED | OUTPATIENT
Start: 2018-08-29 | End: 2018-08-29

## 2018-08-29 RX ADMIN — SODIUM CHLORIDE 1000 MILLILITER(S): 9 INJECTION INTRAMUSCULAR; INTRAVENOUS; SUBCUTANEOUS at 11:00

## 2018-10-17 ENCOUNTER — FORM ENCOUNTER (OUTPATIENT)
Age: 73
End: 2018-10-17

## 2018-10-18 ENCOUNTER — OUTPATIENT (OUTPATIENT)
Dept: OUTPATIENT SERVICES | Facility: HOSPITAL | Age: 73
LOS: 1 days | End: 2018-10-18
Payer: MEDICARE

## 2018-10-18 ENCOUNTER — APPOINTMENT (OUTPATIENT)
Dept: CT IMAGING | Facility: CLINIC | Age: 73
End: 2018-10-18
Payer: MEDICARE

## 2018-10-18 DIAGNOSIS — Z96.641 PRESENCE OF RIGHT ARTIFICIAL HIP JOINT: Chronic | ICD-10-CM

## 2018-10-18 DIAGNOSIS — Z98.890 OTHER SPECIFIED POSTPROCEDURAL STATES: Chronic | ICD-10-CM

## 2018-10-18 DIAGNOSIS — Z00.8 ENCOUNTER FOR OTHER GENERAL EXAMINATION: ICD-10-CM

## 2018-10-18 PROCEDURE — 74170 CT ABD WO CNTRST FLWD CNTRST: CPT | Mod: 26

## 2018-10-18 PROCEDURE — 74170 CT ABD WO CNTRST FLWD CNTRST: CPT

## 2018-10-18 PROCEDURE — 82565 ASSAY OF CREATININE: CPT

## 2018-10-31 ENCOUNTER — APPOINTMENT (OUTPATIENT)
Dept: UROLOGY | Facility: CLINIC | Age: 73
End: 2018-10-31
Payer: MEDICARE

## 2018-10-31 VITALS — HEART RATE: 99 BPM | DIASTOLIC BLOOD PRESSURE: 86 MMHG | SYSTOLIC BLOOD PRESSURE: 147 MMHG

## 2018-10-31 PROCEDURE — 99213 OFFICE O/P EST LOW 20 MIN: CPT

## 2019-07-12 ENCOUNTER — APPOINTMENT (OUTPATIENT)
Dept: UROLOGY | Facility: CLINIC | Age: 74
End: 2019-07-12
Payer: MEDICARE

## 2019-07-12 VITALS
BODY MASS INDEX: 42.38 KG/M2 | WEIGHT: 270 LBS | HEIGHT: 67 IN | SYSTOLIC BLOOD PRESSURE: 126 MMHG | HEART RATE: 123 BPM | RESPIRATION RATE: 18 BRPM | TEMPERATURE: 98.6 F | DIASTOLIC BLOOD PRESSURE: 80 MMHG | OXYGEN SATURATION: 95 %

## 2019-07-12 LAB
BILIRUB UR QL STRIP: NORMAL
GLUCOSE UR-MCNC: NORMAL
HCG UR QL: 0.2 EU/DL
HGB UR QL STRIP.AUTO: ABNORMAL
KETONES UR-MCNC: NORMAL
LEUKOCYTE ESTERASE UR QL STRIP: ABNORMAL
NITRITE UR QL STRIP: NORMAL
PH UR STRIP: 5
PROT UR STRIP-MCNC: 100
SP GR UR STRIP: 1.02

## 2019-07-12 PROCEDURE — 99214 OFFICE O/P EST MOD 30 MIN: CPT | Mod: 25

## 2019-07-12 PROCEDURE — 81003 URINALYSIS AUTO W/O SCOPE: CPT | Mod: QW

## 2019-07-12 RX ORDER — CEFUROXIME AXETIL 500 MG/1
500 TABLET ORAL
Qty: 20 | Refills: 0 | Status: DISCONTINUED | COMMUNITY
Start: 2019-07-12 | End: 2019-07-12

## 2019-07-12 NOTE — PHYSICAL EXAM
[General Appearance - Well Nourished] : well nourished [General Appearance - Well Developed] : well developed [Normal Appearance] : normal appearance [General Appearance - In No Acute Distress] : no acute distress [Well Groomed] : well groomed [FreeTextEntry1] : Moderate obesity [Abdomen Soft] : soft [Abdomen Tenderness] : non-tender [Costovertebral Angle Tenderness] : no ~M costovertebral angle tenderness [Urinary Bladder Findings] : the bladder was normal on palpation [Edema] : no peripheral edema [] : no respiratory distress [Respiration, Rhythm And Depth] : normal respiratory rhythm and effort [Exaggerated Use Of Accessory Muscles For Inspiration] : no accessory muscle use [Oriented To Time, Place, And Person] : oriented to person, place, and time [Not Anxious] : not anxious [Affect] : the affect was normal [Mood] : the mood was normal [Normal Station and Gait] : the gait and station were normal for the patient's age [No Focal Deficits] : no focal deficits [No Palpable Adenopathy] : no palpable adenopathy

## 2019-07-12 NOTE — HISTORY OF PRESENT ILLNESS
[FreeTextEntry1] : This patient is here with her son. She states that for the past day or 2 she has had lower abdominal discomfort and irritative symptoms with voiding. She states that she noted gross hematuria and fever and chills last evening.

## 2019-07-12 NOTE — ASSESSMENT
[FreeTextEntry1] : The most obvious diagnosis is probable urinary tract infection although a renal colic is secondary to

## 2019-07-12 NOTE — END OF VISIT
[FreeTextEntry3] : The urine is sent and a CAT scan is ordered. She'll follow up with cystourethroscopy. She will start ciprofloxacin and I also prescribed Zofran for nausea. If fever or chills occurred and I advised her son to take her to the emergency room for intravenous antibiotics.

## 2019-07-14 LAB
APPEARANCE: ABNORMAL
BACTERIA: NEGATIVE
BILIRUBIN URINE: NEGATIVE
BLOOD URINE: ABNORMAL
COLOR: YELLOW
GLUCOSE QUALITATIVE U: NEGATIVE
GRANULAR CASTS: 1 /LPF
HYALINE CASTS: 2 /LPF
KETONES URINE: NEGATIVE
LEUKOCYTE ESTERASE URINE: ABNORMAL
MICROSCOPIC-UA: NORMAL
NITRITE URINE: NEGATIVE
PH URINE: 6
PROTEIN URINE: ABNORMAL
RED BLOOD CELLS URINE: 11 /HPF
SPECIFIC GRAVITY URINE: 1.02
SQUAMOUS EPITHELIAL CELLS: 9 /HPF
UROBILINOGEN URINE: NORMAL
WHITE BLOOD CELLS URINE: 227 /HPF

## 2019-07-17 ENCOUNTER — FORM ENCOUNTER (OUTPATIENT)
Age: 74
End: 2019-07-17

## 2019-07-18 ENCOUNTER — OUTPATIENT (OUTPATIENT)
Dept: OUTPATIENT SERVICES | Facility: HOSPITAL | Age: 74
LOS: 1 days | End: 2019-07-18
Payer: MEDICARE

## 2019-07-18 ENCOUNTER — APPOINTMENT (OUTPATIENT)
Dept: CT IMAGING | Facility: CLINIC | Age: 74
End: 2019-07-18
Payer: MEDICARE

## 2019-07-18 DIAGNOSIS — Z00.8 ENCOUNTER FOR OTHER GENERAL EXAMINATION: ICD-10-CM

## 2019-07-18 DIAGNOSIS — Z98.890 OTHER SPECIFIED POSTPROCEDURAL STATES: Chronic | ICD-10-CM

## 2019-07-18 DIAGNOSIS — Z96.641 PRESENCE OF RIGHT ARTIFICIAL HIP JOINT: Chronic | ICD-10-CM

## 2019-07-18 PROCEDURE — 74178 CT ABD&PLV WO CNTR FLWD CNTR: CPT | Mod: 26

## 2019-07-18 PROCEDURE — 74178 CT ABD&PLV WO CNTR FLWD CNTR: CPT

## 2019-07-18 PROCEDURE — 82565 ASSAY OF CREATININE: CPT

## 2019-07-25 ENCOUNTER — APPOINTMENT (OUTPATIENT)
Age: 74
End: 2019-07-25
Payer: MEDICARE

## 2019-07-25 VITALS
SYSTOLIC BLOOD PRESSURE: 107 MMHG | HEIGHT: 67 IN | OXYGEN SATURATION: 94 % | DIASTOLIC BLOOD PRESSURE: 68 MMHG | HEART RATE: 75 BPM | WEIGHT: 270 LBS | BODY MASS INDEX: 42.38 KG/M2

## 2019-07-25 PROCEDURE — 52285 CYSTOSCOPY AND TREATMENT: CPT

## 2019-08-19 ENCOUNTER — MEDICATION RENEWAL (OUTPATIENT)
Age: 74
End: 2019-08-19

## 2019-08-22 ENCOUNTER — MEDICATION RENEWAL (OUTPATIENT)
Age: 74
End: 2019-08-22

## 2019-08-26 ENCOUNTER — APPOINTMENT (OUTPATIENT)
Age: 74
End: 2019-08-26
Payer: MEDICARE

## 2019-08-26 PROCEDURE — 51741 ELECTRO-UROFLOWMETRY FIRST: CPT

## 2019-08-26 PROCEDURE — 51798 US URINE CAPACITY MEASURE: CPT | Mod: 59

## 2019-08-26 PROCEDURE — 51784 ANAL/URINARY MUSCLE STUDY: CPT

## 2019-08-26 PROCEDURE — 51728 CYSTOMETROGRAM W/VP: CPT

## 2019-08-26 PROCEDURE — 51797 INTRAABDOMINAL PRESSURE TEST: CPT

## 2020-04-02 PROBLEM — N32.9 LESION OF BLADDER: Status: ACTIVE | Noted: 2018-05-14

## 2020-06-08 LAB
APPEARANCE: CLEAR
BACTERIA UR CULT: ABNORMAL
BACTERIA: ABNORMAL
BILIRUBIN URINE: NEGATIVE
BLOOD URINE: NORMAL
COLOR: NORMAL
GLUCOSE QUALITATIVE U: NEGATIVE
HYALINE CASTS: 3 /LPF
KETONES URINE: NEGATIVE
LEUKOCYTE ESTERASE URINE: NEGATIVE
MICROSCOPIC-UA: NORMAL
NITRITE URINE: NEGATIVE
PH URINE: 6
PROTEIN URINE: ABNORMAL
RED BLOOD CELLS URINE: 8 /HPF
SPECIFIC GRAVITY URINE: >=1.03
SQUAMOUS EPITHELIAL CELLS: 12 /HPF
URINE CYTOLOGY: NORMAL
UROBILINOGEN URINE: NORMAL
WHITE BLOOD CELLS URINE: 4 /HPF

## 2020-06-09 ENCOUNTER — APPOINTMENT (OUTPATIENT)
Dept: UROLOGY | Facility: CLINIC | Age: 75
End: 2020-06-09
Payer: MEDICARE

## 2020-06-09 VITALS
BODY MASS INDEX: 41.91 KG/M2 | HEIGHT: 67 IN | OXYGEN SATURATION: 96 % | DIASTOLIC BLOOD PRESSURE: 76 MMHG | SYSTOLIC BLOOD PRESSURE: 138 MMHG | TEMPERATURE: 97.7 F | WEIGHT: 267 LBS | HEART RATE: 92 BPM

## 2020-06-09 PROCEDURE — 99213 OFFICE O/P EST LOW 20 MIN: CPT

## 2020-06-09 NOTE — PHYSICAL EXAM
[General Appearance - Well Developed] : well developed [General Appearance - Well Nourished] : well nourished [Normal Appearance] : normal appearance [Well Groomed] : well groomed [General Appearance - In No Acute Distress] : no acute distress [FreeTextEntry1] : Moderate obesity [Abdomen Soft] : soft [Abdomen Tenderness] : non-tender [Costovertebral Angle Tenderness] : no ~M costovertebral angle tenderness [Urinary Bladder Findings] : the bladder was normal on palpation [Edema] : no peripheral edema [] : no respiratory distress [Respiration, Rhythm And Depth] : normal respiratory rhythm and effort [Exaggerated Use Of Accessory Muscles For Inspiration] : no accessory muscle use [Oriented To Time, Place, And Person] : oriented to person, place, and time [Affect] : the affect was normal [Mood] : the mood was normal [Not Anxious] : not anxious [Normal Station and Gait] : the gait and station were normal for the patient's age [No Focal Deficits] : no focal deficits [No Palpable Adenopathy] : no palpable adenopathy

## 2020-06-09 NOTE — END OF VISIT
[FreeTextEntry3] : Ciprofloxacin, metronidazole, and Diflucan were prescribed.  She will call with any lack of success.  The urine will be done in 10 days

## 2020-06-09 NOTE — HISTORY OF PRESENT ILLNESS
[FreeTextEntry1] : This patient has lower tract symptoms and her urine culture shows E. coli sensitive to almost everything.  She now also notes some vaginal burning

## 2021-02-06 ENCOUNTER — TRANSCRIPTION ENCOUNTER (OUTPATIENT)
Age: 76
End: 2021-02-06

## 2021-02-23 ENCOUNTER — APPOINTMENT (OUTPATIENT)
Dept: UROLOGY | Facility: CLINIC | Age: 76
End: 2021-02-23

## 2021-03-01 ENCOUNTER — APPOINTMENT (OUTPATIENT)
Dept: UROLOGY | Facility: CLINIC | Age: 76
End: 2021-03-01
Payer: MEDICARE

## 2021-03-01 DIAGNOSIS — N31.2 FLACCID NEUROPATHIC BLADDER, NOT ELSEWHERE CLASSIFIED: ICD-10-CM

## 2021-03-01 PROCEDURE — 99072 ADDL SUPL MATRL&STAF TM PHE: CPT

## 2021-03-01 PROCEDURE — 99213 OFFICE O/P EST LOW 20 MIN: CPT

## 2021-03-01 NOTE — END OF VISIT
[FreeTextEntry3] : Urine is sent for culture analysis and cytology and a sonogram of the abdomen and bladder is ordered.  She will follow-up here with cystourethroscopy in 3 weeks

## 2021-03-02 LAB
APPEARANCE: CLEAR
BACTERIA: NEGATIVE
BILIRUBIN URINE: NEGATIVE
BLOOD URINE: NEGATIVE
COLOR: YELLOW
GLUCOSE QUALITATIVE U: NEGATIVE
HYALINE CASTS: 4 /LPF
KETONES URINE: NEGATIVE
LEUKOCYTE ESTERASE URINE: NEGATIVE
MICROSCOPIC-UA: NORMAL
NITRITE URINE: NEGATIVE
PH URINE: 5.5
PROTEIN URINE: NORMAL
RED BLOOD CELLS URINE: 5 /HPF
SPECIFIC GRAVITY URINE: 1.02
SQUAMOUS EPITHELIAL CELLS: 10 /HPF
UROBILINOGEN URINE: NORMAL
WHITE BLOOD CELLS URINE: 4 /HPF

## 2021-03-03 LAB — BACTERIA UR CULT: NORMAL

## 2021-03-20 ENCOUNTER — APPOINTMENT (OUTPATIENT)
Dept: RADIOLOGY | Facility: CLINIC | Age: 76
End: 2021-03-20
Payer: MEDICARE

## 2021-03-20 ENCOUNTER — OUTPATIENT (OUTPATIENT)
Dept: OUTPATIENT SERVICES | Facility: HOSPITAL | Age: 76
LOS: 1 days | End: 2021-03-20
Payer: MEDICARE

## 2021-03-20 DIAGNOSIS — N31.2 FLACCID NEUROPATHIC BLADDER, NOT ELSEWHERE CLASSIFIED: ICD-10-CM

## 2021-03-20 DIAGNOSIS — Z98.890 OTHER SPECIFIED POSTPROCEDURAL STATES: Chronic | ICD-10-CM

## 2021-03-20 DIAGNOSIS — N39.0 URINARY TRACT INFECTION, SITE NOT SPECIFIED: ICD-10-CM

## 2021-03-20 DIAGNOSIS — Z96.641 PRESENCE OF RIGHT ARTIFICIAL HIP JOINT: Chronic | ICD-10-CM

## 2021-03-20 PROCEDURE — 74018 RADEX ABDOMEN 1 VIEW: CPT

## 2021-03-20 PROCEDURE — 74018 RADEX ABDOMEN 1 VIEW: CPT | Mod: 26

## 2021-03-22 ENCOUNTER — APPOINTMENT (OUTPATIENT)
Dept: UROLOGY | Facility: CLINIC | Age: 76
End: 2021-03-22
Payer: MEDICARE

## 2021-03-22 VITALS
TEMPERATURE: 97.3 F | OXYGEN SATURATION: 96 % | HEART RATE: 88 BPM | DIASTOLIC BLOOD PRESSURE: 67 MMHG | SYSTOLIC BLOOD PRESSURE: 136 MMHG

## 2021-03-22 DIAGNOSIS — N28.89 OTHER SPECIFIED DISORDERS OF KIDNEY AND URETER: ICD-10-CM

## 2021-03-22 PROCEDURE — 99072 ADDL SUPL MATRL&STAF TM PHE: CPT

## 2021-03-22 PROCEDURE — 52285 CYSTOSCOPY AND TREATMENT: CPT

## 2021-07-15 ENCOUNTER — APPOINTMENT (OUTPATIENT)
Dept: ORTHOPEDIC SURGERY | Facility: CLINIC | Age: 76
End: 2021-07-15
Payer: MEDICARE

## 2021-07-15 PROCEDURE — 99072 ADDL SUPL MATRL&STAF TM PHE: CPT

## 2021-07-15 PROCEDURE — 73630 X-RAY EXAM OF FOOT: CPT | Mod: 26,RT

## 2021-07-15 PROCEDURE — 99204 OFFICE O/P NEW MOD 45 MIN: CPT

## 2021-07-15 NOTE — PHYSICAL EXAM
[de-identified] : Right ankle/foot Physical Examination:\par \par General: Alert and oriented x3.  In no acute distress.  Pleasant in nature with a normal affect.  No apparent respiratory distress. \par Erythema, Warmth, Rubor: Negative\par Swelling: Moderate to severe swelling on the lateral aspect of the ankle and foot.\par \par ROM Ankle:\par 1. Dorsiflexion: 10 degrees\par 2. Plantarflexion: 40 degrees\par 3. Inversion: 20 degrees\par 4. Eversion: 10 degrees\par \par ROM of digits: Normal\par \par Pes Planus: Negative\par Pes Cavus: Negative\par \par Bunion: Negative\par Sophy's Bunion (Bunionette): Negative\par Hammer Toe Deformity/Deformities: Negative\par \par Tenderness to Palpation: \par 1. Heel Pain: Negative\par 2. Midfoot Pain: Negative\par 3. First MTP Joint: Negative\par 4. Lis Franc Joint: Negative\par \par Tenderness Metatarsals:\par 1st MT: Negative\par 2nd MT: Negative\par 3rd MT: Negative\par 4th MT: Negative\par 5th MT: Negative\par Base of the 5th MT: Negative\par \par Ligament Pain:\par 1. Lis Franc Ligament: Negative\par 2. Plantar Fascia Ligament: Negative\par \par Strength: \par 5/5 TA/GS/EHL/FHL/EDL/ADD/ABD\par \par Pulses: 2+ DP/PT Pulses\par \par Capillary Refill Toes: <2 seconds\par \par Neuro: Intact motor and sensory throughout\par \par Additional Test:\par 1. Skinner's Squeeze Test: Negative\par 2. Calcaneal Squeeze Test: Negative\par \par *The patient has severe pain when palpating the lateral aspect of the ankle.  The pain is mostly over the peroneal tendons and peroneal tubercle. [de-identified] : 3 views x-rays of the right foot reviewed, 7/15/2021: No fracture seen.

## 2021-07-15 NOTE — PHYSICAL EXAM
[de-identified] : Right ankle/foot Physical Examination:\par \par General: Alert and oriented x3.  In no acute distress.  Pleasant in nature with a normal affect.  No apparent respiratory distress. \par Erythema, Warmth, Rubor: Negative\par Swelling: Moderate to severe swelling on the lateral aspect of the ankle and foot.\par \par ROM Ankle:\par 1. Dorsiflexion: 10 degrees\par 2. Plantarflexion: 40 degrees\par 3. Inversion: 20 degrees\par 4. Eversion: 10 degrees\par \par ROM of digits: Normal\par \par Pes Planus: Negative\par Pes Cavus: Negative\par \par Bunion: Negative\par Sophy's Bunion (Bunionette): Negative\par Hammer Toe Deformity/Deformities: Negative\par \par Tenderness to Palpation: \par 1. Heel Pain: Negative\par 2. Midfoot Pain: Negative\par 3. First MTP Joint: Negative\par 4. Lis Franc Joint: Negative\par \par Tenderness Metatarsals:\par 1st MT: Negative\par 2nd MT: Negative\par 3rd MT: Negative\par 4th MT: Negative\par 5th MT: Negative\par Base of the 5th MT: Negative\par \par Ligament Pain:\par 1. Lis Franc Ligament: Negative\par 2. Plantar Fascia Ligament: Negative\par \par Strength: \par 5/5 TA/GS/EHL/FHL/EDL/ADD/ABD\par \par Pulses: 2+ DP/PT Pulses\par \par Capillary Refill Toes: <2 seconds\par \par Neuro: Intact motor and sensory throughout\par \par Additional Test:\par 1. Skinner's Squeeze Test: Negative\par 2. Calcaneal Squeeze Test: Negative\par \par *The patient has severe pain when palpating the lateral aspect of the ankle.  The pain is mostly over the peroneal tendons and peroneal tubercle. [de-identified] : 3 views x-rays of the right foot reviewed, 7/15/2021: No fracture seen.

## 2021-07-15 NOTE — DISCUSSION/SUMMARY
[de-identified] : Assessment: Right ankle/foot injury\par \par Plan:\par #1. Short CAM Boot given.  Wear as instructed for two weeks.  The patient can weight-bear as tolerated with the boot.  If she needs assistance for balance she can use a cane.\par #2. Tylenol as needed.\par #3. RICE therapy\par #4. Limit activities and use of ankle for 2 weeks. \par #5. All questions answered.  Patient will follow-up in office in 2 weeks.  If symptoms worsen or patient has concerns, they may call office and/or come into office sooner if needed.  If the patient has continued pain and swelling of the right ankle/foot at the next office visit consider MRI.

## 2021-07-15 NOTE — HISTORY OF PRESENT ILLNESS
[FreeTextEntry1] : The patient is a 76 yo female who presents with a right ankle and foot injury which she sustained on Monday after she stepped out of a car and stepped directly onto the right ankle and foot, injuring the right ankle and foot, she did feel and here a pop in the ankle/foot when this occurred.  Pain scale 5/10.  She presents with swelling and redness around the ankle and foot, mostly on the lateral side.  She went to [facility] on Tuesday where they took x-rays.  She presents wearing slip on flip flops.  No cane or assistance for walking.  No fevers, chills or night sweats.  Patient walking with a limp.   No other complaints.

## 2021-07-15 NOTE — HISTORY OF PRESENT ILLNESS
[FreeTextEntry1] : The patient is a 74 yo female who presents with a right ankle and foot injury which she sustained on Monday after she stepped out of a car and stepped directly onto the right ankle and foot, injuring the right ankle and foot, she did feel and here a pop in the ankle/foot when this occurred.  Pain scale 5/10.  She presents with swelling and redness around the ankle and foot, mostly on the lateral side.  She went to [facility] on Tuesday where they took x-rays.  She presents wearing slip on flip flops.  No cane or assistance for walking.  No fevers, chills or night sweats.  Patient walking with a limp.   No other complaints.

## 2021-07-15 NOTE — REVIEW OF SYSTEMS
[Joint Pain] : joint pain [Negative] : Heme/Lymph [Joint Swelling] : joint swelling [FreeTextEntry9] : Right ankle pain

## 2021-07-15 NOTE — DISCUSSION/SUMMARY
[de-identified] : Assessment: Right ankle/foot injury\par \par Plan:\par #1. Short CAM Boot given.  Wear as instructed for two weeks.  The patient can weight-bear as tolerated with the boot.  If she needs assistance for balance she can use a cane.\par #2. Tylenol as needed.\par #3. RICE therapy\par #4. Limit activities and use of ankle for 2 weeks. \par #5. All questions answered.  Patient will follow-up in office in 2 weeks.  If symptoms worsen or patient has concerns, they may call office and/or come into office sooner if needed.  If the patient has continued pain and swelling of the right ankle/foot at the next office visit consider MRI.

## 2021-07-29 ENCOUNTER — APPOINTMENT (OUTPATIENT)
Dept: ORTHOPEDIC SURGERY | Facility: CLINIC | Age: 76
End: 2021-07-29
Payer: MEDICARE

## 2021-07-29 PROCEDURE — 99214 OFFICE O/P EST MOD 30 MIN: CPT

## 2021-07-29 NOTE — REVIEW OF SYSTEMS
[Joint Pain] : joint pain [Joint Swelling] : joint swelling [Negative] : Heme/Lymph [FreeTextEntry9] : Right ankle pain

## 2021-07-29 NOTE — ADDENDUM
[FreeTextEntry1] : Medical record entries made by the scribe today, were at my direction and personally dictated to them by me, Dr. Leonardo Martin on 07/29/2021. I have reviewed the chart and agree that the record accurately reflects my personal performance of the history, physical exam, assessment and plan.\par \par

## 2021-07-29 NOTE — PHYSICAL EXAM
[de-identified] : Right ankle/foot Physical Examination:\par \par General: Alert and oriented x3.  In no acute distress.  Pleasant in nature with a normal affect.  No apparent respiratory distress. \par Erythema, Warmth, Rubor: Negative\par Swelling: Minor swelling on the lateral aspect of the ankle and foot.\par \par ROM Ankle:\par 1. Dorsiflexion: 10 degrees\par 2. Plantarflexion: 40 degrees\par 3. Inversion: 20 degrees\par 4. Eversion: 10 degrees\par \par ROM of digits: Normal\par \par Pes Planus: Negative\par Pes Cavus: Negative\par \par Bunion: Negative\par Sophy's Bunion (Bunionette): Negative\par Hammer Toe Deformity/Deformities: Negative\par \par Tenderness to Palpation: \par (+) Peroneal tendon\par 1. Heel Pain: Negative\par 2. Midfoot Pain: Negative\par 3. First MTP Joint: Negative\par 4. Lis Franc Joint: Negative\par \par Tenderness Metatarsals:\par 1st MT: Negative\par 2nd MT: Negative\par 3rd MT: Negative\par 4th MT: Negative\par 5th MT: Negative\par Base of the 5th MT: Negative\par \par Ligament Pain:\par 1. Lis Franc Ligament: Negative\par 2. Plantar Fascia Ligament: Negative\par \par Strength: \par 5/5 TA/GS/EHL/FHL/EDL/ADD/ABD\par \par Pulses: 2+ DP/PT Pulses\par \par Capillary Refill Toes: <2 seconds\par \par Neuro: Intact motor and sensory throughout\par \par Additional Test:\par 1. Skinner's Squeeze Test: Negative\par 2. Calcaneal Squeeze Test: Negative\par \par *The patient has severe pain when palpating the lateral aspect of the ankle.  The pain is mostly over the peroneal tendons and peroneal tubercle. [de-identified] : No new imaging.

## 2021-07-29 NOTE — HISTORY OF PRESENT ILLNESS
[FreeTextEntry1] : 7/29/21: RIZWAN SALAZAR is a 75 year old female who presents for follow-up evaluation of right ankle and foot. She presents today with improved pain and swelling. Pain scale 1/10.\par \par 7/15/21: The patient is a 76 yo female who presents with a right ankle and foot injury which she sustained on Monday after she stepped out of a car and stepped directly onto the right ankle and foot, injuring the right ankle and foot, she did feel and here a pop in the ankle/foot when this occurred.  Pain scale 5/10.  She presents with swelling and redness around the ankle and foot, mostly on the lateral side.  She went to [facility] on Tuesday where they took x-rays.  She presents wearing slip on flip flops.  No cane or assistance for walking.  No fevers, chills or night sweats.  Patient walking with a limp.   No other complaints.

## 2021-07-29 NOTE — DISCUSSION/SUMMARY
[de-identified] : Today I had a lengthy discussion with the patient regarding their right ankle / foot pain. I have addressed all the patient's concerns surrounding the pathology of their condition.\par \par She can buy and utilize an OTC sleeve, can stop wearing CAM boot.\par \par I recommend that the patient utilize Voltaren gel topically. If the Voltaren gel could not be obtained, Icy Hot, Biofreeze, or Bengay can be utilized instead.\par \par I recommend that the patient utilize ice, head, NSAIDs prn. They can also elevate their right ankle above the level of the heart.\par \par I recommend the patient undergo a course of physical therapy for the right ankle / foot 2-3 times a week for a total of 6-8 weeks. A prescription was given for the physical therapy today.\par \par I would like to see the patient back in the office prn to reassess their condition.\par \par If no improvement in 2-3 months will proceed with MRI imaging.\par \par The patient understood and verbally agreed to the treatment plan. All of their questions were answered and they were satisfied with the visit. The patient should call the office if they have any questions or experience worsening symptoms.\par \par \par

## 2022-01-25 ENCOUNTER — APPOINTMENT (OUTPATIENT)
Dept: UROLOGY | Facility: CLINIC | Age: 77
End: 2022-01-25
Payer: MEDICARE

## 2022-01-25 VITALS
WEIGHT: 210 LBS | OXYGEN SATURATION: 98 % | SYSTOLIC BLOOD PRESSURE: 140 MMHG | HEIGHT: 67 IN | HEART RATE: 91 BPM | BODY MASS INDEX: 32.96 KG/M2 | DIASTOLIC BLOOD PRESSURE: 75 MMHG

## 2022-01-25 PROCEDURE — 99213 OFFICE O/P EST LOW 20 MIN: CPT

## 2022-01-26 LAB
APPEARANCE: CLEAR
BACTERIA: NEGATIVE
BILIRUBIN URINE: NEGATIVE
BLOOD URINE: ABNORMAL
COLOR: NORMAL
GLUCOSE QUALITATIVE U: NORMAL
HYALINE CASTS: 4 /LPF
KETONES URINE: NEGATIVE
LEUKOCYTE ESTERASE URINE: NEGATIVE
MICROSCOPIC-UA: NORMAL
NITRITE URINE: NEGATIVE
PH URINE: 5.5
PROTEIN URINE: NORMAL
RED BLOOD CELLS URINE: 8 /HPF
SPECIFIC GRAVITY URINE: 1.02
SQUAMOUS EPITHELIAL CELLS: 4 /HPF
UROBILINOGEN URINE: NORMAL
WHITE BLOOD CELLS URINE: 8 /HPF

## 2022-01-27 LAB — BACTERIA UR CULT: NORMAL

## 2022-01-28 NOTE — END OF VISIT
[FreeTextEntry3] : Nitrofurantoin was prescribed and urine sent for culture analysis and cytology.  She will follow up with cystourethroscopy in several weeks

## 2022-01-28 NOTE — HISTORY OF PRESENT ILLNESS
[FreeTextEntry1] : This patient presents for evaluation of lower tract symptoms.  She has a history of urinary tract infections in the past but has not 1 had one recently.

## 2022-02-01 LAB — URINE CYTOLOGY: NORMAL

## 2022-02-10 ENCOUNTER — APPOINTMENT (OUTPATIENT)
Dept: UROLOGY | Facility: CLINIC | Age: 77
End: 2022-02-10
Payer: MEDICARE

## 2022-02-10 VITALS
DIASTOLIC BLOOD PRESSURE: 60 MMHG | WEIGHT: 290 LBS | OXYGEN SATURATION: 93 % | SYSTOLIC BLOOD PRESSURE: 116 MMHG | HEART RATE: 80 BPM | HEIGHT: 67 IN | BODY MASS INDEX: 45.52 KG/M2

## 2022-02-10 DIAGNOSIS — N34.3 URETHRAL SYNDROME, UNSPECIFIED: ICD-10-CM

## 2022-02-10 DIAGNOSIS — R31.29 OTHER MICROSCOPIC HEMATURIA: ICD-10-CM

## 2022-02-10 PROCEDURE — 99214 OFFICE O/P EST MOD 30 MIN: CPT | Mod: 25

## 2022-02-10 PROCEDURE — 52285 CYSTOSCOPY AND TREATMENT: CPT

## 2022-02-10 NOTE — END OF VISIT
[FreeTextEntry3] : A CAT scan is ordered and she will trial Myrbetriq at the 50 and 25 mg dosages.  Her urethra was dilated today and she will follow up with urodynamics

## 2022-02-10 NOTE — HISTORY OF PRESENT ILLNESS
[FreeTextEntry1] : This patient is here for cystoscopy today but has noted an increase in urinary frequency.  Microhematuria was also noted on a recent urine analysis.

## 2022-02-11 ENCOUNTER — OUTPATIENT (OUTPATIENT)
Dept: OUTPATIENT SERVICES | Facility: HOSPITAL | Age: 77
LOS: 1 days | End: 2022-02-11
Payer: MEDICARE

## 2022-02-11 ENCOUNTER — APPOINTMENT (OUTPATIENT)
Dept: CT IMAGING | Facility: CLINIC | Age: 77
End: 2022-02-11
Payer: MEDICARE

## 2022-02-11 DIAGNOSIS — R31.29 OTHER MICROSCOPIC HEMATURIA: ICD-10-CM

## 2022-02-11 DIAGNOSIS — Z96.641 PRESENCE OF RIGHT ARTIFICIAL HIP JOINT: Chronic | ICD-10-CM

## 2022-02-11 DIAGNOSIS — Z98.890 OTHER SPECIFIED POSTPROCEDURAL STATES: Chronic | ICD-10-CM

## 2022-02-11 PROCEDURE — 74178 CT ABD&PLV WO CNTR FLWD CNTR: CPT | Mod: 26

## 2022-02-11 PROCEDURE — 74178 CT ABD&PLV WO CNTR FLWD CNTR: CPT

## 2022-02-12 LAB
ANION GAP SERPL CALC-SCNC: 16 MMOL/L
BUN SERPL-MCNC: 30 MG/DL
CALCIUM SERPL-MCNC: 9.6 MG/DL
CHLORIDE SERPL-SCNC: 102 MMOL/L
CO2 SERPL-SCNC: 24 MMOL/L
CREAT SERPL-MCNC: 1.54 MG/DL
GLUCOSE SERPL-MCNC: 231 MG/DL
POTASSIUM SERPL-SCNC: 4.3 MMOL/L
SODIUM SERPL-SCNC: 141 MMOL/L

## 2022-03-11 ENCOUNTER — NON-APPOINTMENT (OUTPATIENT)
Age: 77
End: 2022-03-11

## 2022-03-11 RX ORDER — CIPROFLOXACIN HYDROCHLORIDE 500 MG/1
500 TABLET, FILM COATED ORAL
Qty: 20 | Refills: 0 | Status: COMPLETED | COMMUNITY
Start: 2019-07-12 | End: 2022-03-11

## 2022-03-11 RX ORDER — CIPROFLOXACIN HYDROCHLORIDE 500 MG/1
500 TABLET, FILM COATED ORAL
Qty: 14 | Refills: 0 | Status: DISCONTINUED | COMMUNITY
Start: 2020-06-09 | End: 2022-03-11

## 2022-03-23 ENCOUNTER — ASOB RESULT (OUTPATIENT)
Age: 77
End: 2022-03-23

## 2022-03-23 ENCOUNTER — APPOINTMENT (OUTPATIENT)
Dept: ANTEPARTUM | Facility: CLINIC | Age: 77
End: 2022-03-23
Payer: MEDICARE

## 2022-03-23 PROCEDURE — 76830 TRANSVAGINAL US NON-OB: CPT

## 2022-03-23 PROCEDURE — 76856 US EXAM PELVIC COMPLETE: CPT | Mod: 59

## 2022-04-18 ENCOUNTER — NON-APPOINTMENT (OUTPATIENT)
Age: 77
End: 2022-04-18

## 2022-04-18 PROBLEM — Z86.39 HISTORY OF DIABETES MELLITUS: Status: RESOLVED | Noted: 2018-04-24 | Resolved: 2022-04-18

## 2022-04-18 RX ORDER — NITROFURANTOIN MACROCRYSTALS 100 MG/1
100 CAPSULE ORAL 3 TIMES DAILY
Qty: 21 | Refills: 0 | Status: DISCONTINUED | COMMUNITY
Start: 2020-05-29 | End: 2022-04-18

## 2022-04-18 RX ORDER — NITROFURANTOIN MACROCRYSTALS 50 MG/1
50 CAPSULE ORAL
Qty: 45 | Refills: 3 | Status: DISCONTINUED | COMMUNITY
Start: 2022-01-25 | End: 2022-04-18

## 2022-04-18 RX ORDER — NITROFURANTOIN MACROCRYSTALS 100 MG/1
100 CAPSULE ORAL 3 TIMES DAILY
Qty: 15 | Refills: 0 | Status: DISCONTINUED | COMMUNITY
Start: 2022-01-25 | End: 2022-04-18

## 2022-04-20 ENCOUNTER — APPOINTMENT (OUTPATIENT)
Dept: GYNECOLOGIC ONCOLOGY | Facility: CLINIC | Age: 77
End: 2022-04-20
Payer: MEDICARE

## 2022-04-20 VITALS
HEIGHT: 67 IN | DIASTOLIC BLOOD PRESSURE: 62 MMHG | WEIGHT: 290 LBS | BODY MASS INDEX: 45.52 KG/M2 | SYSTOLIC BLOOD PRESSURE: 122 MMHG

## 2022-04-20 DIAGNOSIS — I10 ESSENTIAL (PRIMARY) HYPERTENSION: ICD-10-CM

## 2022-04-20 DIAGNOSIS — Z01.818 ENCOUNTER FOR OTHER PREPROCEDURAL EXAMINATION: ICD-10-CM

## 2022-04-20 DIAGNOSIS — Z87.448 PERSONAL HISTORY OF OTHER DISEASES OF URINARY SYSTEM: ICD-10-CM

## 2022-04-20 DIAGNOSIS — Z87.39 PERSONAL HISTORY OF OTHER DISEASES OF THE MUSCULOSKELETAL SYSTEM AND CONNECTIVE TISSUE: ICD-10-CM

## 2022-04-20 DIAGNOSIS — N95.2 POSTMENOPAUSAL ATROPHIC VAGINITIS: ICD-10-CM

## 2022-04-20 DIAGNOSIS — M25.571 PAIN IN RIGHT ANKLE AND JOINTS OF RIGHT FOOT: ICD-10-CM

## 2022-04-20 DIAGNOSIS — S86.301A UNSPECIFIED INJURY OF MUSCLE(S) AND TENDON(S) OF PERONEAL MUSCLE GROUP AT LOWER LEG LEVEL, RIGHT LEG, INITIAL ENCOUNTER: ICD-10-CM

## 2022-04-20 DIAGNOSIS — Z87.19 PERSONAL HISTORY OF OTHER DISEASES OF THE DIGESTIVE SYSTEM: ICD-10-CM

## 2022-04-20 DIAGNOSIS — Z86.79 PERSONAL HISTORY OF OTHER DISEASES OF THE CIRCULATORY SYSTEM: ICD-10-CM

## 2022-04-20 DIAGNOSIS — M25.471 EFFUSION, RIGHT ANKLE: ICD-10-CM

## 2022-04-20 DIAGNOSIS — Z86.39 PERSONAL HISTORY OF OTHER ENDOCRINE, NUTRITIONAL AND METABOLIC DISEASE: ICD-10-CM

## 2022-04-20 DIAGNOSIS — Z87.898 PERSONAL HISTORY OF OTHER SPECIFIED CONDITIONS: ICD-10-CM

## 2022-04-20 DIAGNOSIS — Z85.828 PERSONAL HISTORY OF OTHER MALIGNANT NEOPLASM OF SKIN: ICD-10-CM

## 2022-04-20 PROCEDURE — 99204 OFFICE O/P NEW MOD 45 MIN: CPT

## 2022-04-20 RX ORDER — ONDANSETRON 4 MG/1
4 TABLET ORAL
Qty: 12 | Refills: 0 | Status: DISCONTINUED | COMMUNITY
Start: 2019-07-12 | End: 2022-04-20

## 2022-04-20 RX ORDER — METRONIDAZOLE 250 MG/1
250 TABLET ORAL
Qty: 3 | Refills: 2 | Status: DISCONTINUED | COMMUNITY
Start: 2020-06-09 | End: 2022-04-20

## 2022-04-20 RX ORDER — OXYBUTYNIN CHLORIDE 5 MG/1
5 TABLET ORAL
Qty: 90 | Refills: 3 | Status: DISCONTINUED | COMMUNITY
Start: 2018-05-21 | End: 2022-04-20

## 2022-04-20 RX ORDER — MIRABEGRON 50 MG/1
50 TABLET, FILM COATED, EXTENDED RELEASE ORAL DAILY
Qty: 7 | Refills: 0 | Status: DISCONTINUED | OUTPATIENT
Start: 2022-02-10 | End: 2022-04-20

## 2022-04-20 RX ORDER — FLUCONAZOLE 150 MG/1
150 TABLET ORAL
Qty: 1 | Refills: 0 | Status: DISCONTINUED | COMMUNITY
Start: 2020-06-09 | End: 2022-04-20

## 2022-04-20 RX ORDER — MIRABEGRON 25 MG/1
25 TABLET, FILM COATED, EXTENDED RELEASE ORAL
Qty: 30 | Refills: 3 | Status: DISCONTINUED | COMMUNITY
Start: 2022-02-10 | End: 2022-04-20

## 2022-04-20 RX ORDER — BETHANECHOL CHLORIDE 50 MG/1
50 TABLET ORAL
Qty: 180 | Refills: 2 | Status: DISCONTINUED | COMMUNITY
Start: 2019-07-25 | End: 2022-04-20

## 2022-04-20 NOTE — REVIEW OF SYSTEMS
[Negative] : Musculoskeletal [Hematuria] : no hematuria [Dysuria] : no dysuria [Vaginal Discharge] : no vaginal discharge [Abn Vag Bleeding] : no abnormal vaginal bleeding [FreeTextEntry4] : (+) stress incontinence

## 2022-04-20 NOTE — HISTORY OF PRESENT ILLNESS
[FreeTextEntry1] : 77 yo  via  LMP age 55 referred by Dr. Piotr Duarte, presents to office to discuss new diagnosis of endometrial adenocarcinoma. Patient reports recurrent "bladder infections" and was undergoing work up with Dr. Zhou. Pelvic US from 3/23/22 revealed AV uterus 7.67 x 5.01 x 4.69 cm. Endometrial stripe 7.21 mm, hyperechoic containing multiple cystic structures most consistent in appearance to endometrial hyperplasia. Left adnexal mass noted measuring 5.7 x 3.4 x 3.1 cm. Pt followed up with Dr. Duarte and had subsequent EMB on 3/31/22, which revealed fragments consistent with endometrial adenocarcinoma (FIGO 1) in a background of complex atypical hyperplasia with endometrial polyp present. Of note, patient was previously evaluated for PMB in  and underwent D&C, hysteroscopy with benign final pathology. Denies unintentional weight loss, abdominal or pelvis pain, bleeding, change in bladder or bowel habits, hematochezia.\par \par Last pap: 2018- ASCUS\par Last mammo: 3/26/22- BIRADS 2\par Last colo: Never\par Last DEXA: Unsure- Osteopenia, as per patient

## 2022-04-20 NOTE — PHYSICAL EXAM
[Chaperone Present] : A chaperone was present in the examining room during all aspects of the physical examination [Abnormal] : Bladder: Abnormal [Normal] : Bimanual Exam: Normal [FreeTextEntry1] : Name of chaperone: Renetta Finnegan PA-C. [de-identified] : (+) prolapse [Ambulatory and capable of all self care but unable to carry out any work activities] : Status 2- Ambulatory and capable of all self care but unable to carry out any work activities. Up and about more than 50% of waking hours

## 2022-04-20 NOTE — ASSESSMENT
[FreeTextEntry1] : 77 yo female with new diagnosed of endometrial adenocarcinoma of the uterus.\par \par I discussed at length with the patient the nature, purpose, risks, benefits, and alternatives of robotic assisted laparoscopic hysterectomy with bilateral salpingo-oophorectomy, sentinel lymph node mapping, uterosacral ligament fixation, and cystoscopy  The patient understands the risks to include,but not be limited to, bowel injury, bleeding (with the possible need for transfusion), bladder or ureteral injury, infections, deep venous thrombosis, and maria isabel-operative death.  The patient also understands that her surgery may not be able to be performed laparoscopically and that she may need a laparotomy (either vertical midline or pfannensteil).  She also understands the limitations of laparoscopic surgery and the possibility of missing a surgical complication with need for subsequent re-exploration.  She agrees to proceed.  She asked numerous questions which were answered to her satisfaction.  She understands the need for a pre-operative bowel preparation and agrees to comply with our instructions.  She also understands the rationale for a cystoscopy at the completion of the procedure and the potential risks of cystoscopy. \par \par All questions and concerns were answered to patient's apparent satisfaction.\par

## 2022-04-20 NOTE — END OF VISIT
[FreeTextEntry3] : This note was written by Renetta Finnegan, acting as a scribe for Dr. Kirit Barrow.\par This note accurately reflects the work and decisions made by me.\par

## 2022-04-28 ENCOUNTER — RESULT REVIEW (OUTPATIENT)
Age: 77
End: 2022-04-28

## 2022-04-28 ENCOUNTER — OUTPATIENT (OUTPATIENT)
Dept: OUTPATIENT SERVICES | Facility: HOSPITAL | Age: 77
LOS: 1 days | End: 2022-04-28
Payer: MEDICARE

## 2022-04-28 VITALS
HEART RATE: 80 BPM | SYSTOLIC BLOOD PRESSURE: 130 MMHG | RESPIRATION RATE: 16 BRPM | DIASTOLIC BLOOD PRESSURE: 88 MMHG | TEMPERATURE: 98 F | WEIGHT: 275.58 LBS | HEIGHT: 67 IN

## 2022-04-28 DIAGNOSIS — I10 ESSENTIAL (PRIMARY) HYPERTENSION: ICD-10-CM

## 2022-04-28 DIAGNOSIS — C54.1 MALIGNANT NEOPLASM OF ENDOMETRIUM: ICD-10-CM

## 2022-04-28 DIAGNOSIS — Z29.9 ENCOUNTER FOR PROPHYLACTIC MEASURES, UNSPECIFIED: ICD-10-CM

## 2022-04-28 DIAGNOSIS — Z98.890 OTHER SPECIFIED POSTPROCEDURAL STATES: Chronic | ICD-10-CM

## 2022-04-28 DIAGNOSIS — Z96.641 PRESENCE OF RIGHT ARTIFICIAL HIP JOINT: Chronic | ICD-10-CM

## 2022-04-28 DIAGNOSIS — Z01.818 ENCOUNTER FOR OTHER PREPROCEDURAL EXAMINATION: ICD-10-CM

## 2022-04-28 LAB
A1C WITH ESTIMATED AVERAGE GLUCOSE RESULT: 7.9 % — HIGH (ref 4–5.6)
ANION GAP SERPL CALC-SCNC: 15 MMOL/L — SIGNIFICANT CHANGE UP (ref 5–17)
APTT BLD: 28.6 SEC — SIGNIFICANT CHANGE UP (ref 27.5–35.5)
BASOPHILS # BLD AUTO: 0.05 K/UL — SIGNIFICANT CHANGE UP (ref 0–0.2)
BASOPHILS NFR BLD AUTO: 0.5 % — SIGNIFICANT CHANGE UP (ref 0–2)
BLD GP AB SCN SERPL QL: SIGNIFICANT CHANGE UP
BUN SERPL-MCNC: 42.3 MG/DL — HIGH (ref 8–20)
CALCIUM SERPL-MCNC: 9.4 MG/DL — SIGNIFICANT CHANGE UP (ref 8.6–10.2)
CHLORIDE SERPL-SCNC: 104 MMOL/L — SIGNIFICANT CHANGE UP (ref 98–107)
CO2 SERPL-SCNC: 20 MMOL/L — LOW (ref 22–29)
CREAT SERPL-MCNC: 1.43 MG/DL — HIGH (ref 0.5–1.3)
EGFR: 38 ML/MIN/1.73M2 — LOW
EOSINOPHIL # BLD AUTO: 0.18 K/UL — SIGNIFICANT CHANGE UP (ref 0–0.5)
EOSINOPHIL NFR BLD AUTO: 1.9 % — SIGNIFICANT CHANGE UP (ref 0–6)
ESTIMATED AVERAGE GLUCOSE: 180 MG/DL — HIGH (ref 68–114)
GLUCOSE SERPL-MCNC: 284 MG/DL — HIGH (ref 70–99)
HCT VFR BLD CALC: 35.4 % — SIGNIFICANT CHANGE UP (ref 34.5–45)
HGB BLD-MCNC: 11.5 G/DL — SIGNIFICANT CHANGE UP (ref 11.5–15.5)
IMM GRANULOCYTES NFR BLD AUTO: 0.4 % — SIGNIFICANT CHANGE UP (ref 0–1.5)
INR BLD: 1.13 RATIO — SIGNIFICANT CHANGE UP (ref 0.88–1.16)
LYMPHOCYTES # BLD AUTO: 1.8 K/UL — SIGNIFICANT CHANGE UP (ref 1–3.3)
LYMPHOCYTES # BLD AUTO: 18.7 % — SIGNIFICANT CHANGE UP (ref 13–44)
MCHC RBC-ENTMCNC: 29.1 PG — SIGNIFICANT CHANGE UP (ref 27–34)
MCHC RBC-ENTMCNC: 32.5 GM/DL — SIGNIFICANT CHANGE UP (ref 32–36)
MCV RBC AUTO: 89.6 FL — SIGNIFICANT CHANGE UP (ref 80–100)
MONOCYTES # BLD AUTO: 0.53 K/UL — SIGNIFICANT CHANGE UP (ref 0–0.9)
MONOCYTES NFR BLD AUTO: 5.5 % — SIGNIFICANT CHANGE UP (ref 2–14)
NEUTROPHILS # BLD AUTO: 7.01 K/UL — SIGNIFICANT CHANGE UP (ref 1.8–7.4)
NEUTROPHILS NFR BLD AUTO: 73 % — SIGNIFICANT CHANGE UP (ref 43–77)
PLATELET # BLD AUTO: 280 K/UL — SIGNIFICANT CHANGE UP (ref 150–400)
POTASSIUM SERPL-MCNC: 4.4 MMOL/L — SIGNIFICANT CHANGE UP (ref 3.5–5.3)
POTASSIUM SERPL-SCNC: 4.4 MMOL/L — SIGNIFICANT CHANGE UP (ref 3.5–5.3)
PROTHROM AB SERPL-ACNC: 13.1 SEC — SIGNIFICANT CHANGE UP (ref 10.5–13.4)
RBC # BLD: 3.95 M/UL — SIGNIFICANT CHANGE UP (ref 3.8–5.2)
RBC # FLD: 15.2 % — HIGH (ref 10.3–14.5)
SODIUM SERPL-SCNC: 139 MMOL/L — SIGNIFICANT CHANGE UP (ref 135–145)
WBC # BLD: 9.61 K/UL — SIGNIFICANT CHANGE UP (ref 3.8–10.5)
WBC # FLD AUTO: 9.61 K/UL — SIGNIFICANT CHANGE UP (ref 3.8–10.5)

## 2022-04-28 PROCEDURE — 71046 X-RAY EXAM CHEST 2 VIEWS: CPT | Mod: 26

## 2022-04-28 PROCEDURE — 71046 X-RAY EXAM CHEST 2 VIEWS: CPT

## 2022-04-28 PROCEDURE — G0463: CPT

## 2022-04-28 RX ORDER — SODIUM CHLORIDE 9 MG/ML
3 INJECTION INTRAMUSCULAR; INTRAVENOUS; SUBCUTANEOUS ONCE
Refills: 0 | Status: DISCONTINUED | OUTPATIENT
Start: 2022-05-03 | End: 2022-05-04

## 2022-04-28 RX ORDER — ATORVASTATIN CALCIUM 80 MG/1
1 TABLET, FILM COATED ORAL
Qty: 0 | Refills: 0 | DISCHARGE

## 2022-04-28 RX ORDER — METFORMIN HYDROCHLORIDE 850 MG/1
0 TABLET ORAL
Qty: 0 | Refills: 0 | DISCHARGE

## 2022-04-28 RX ORDER — METRONIDAZOLE 500 MG
500 TABLET ORAL ONCE
Refills: 0 | Status: DISCONTINUED | OUTPATIENT
Start: 2022-05-03 | End: 2022-05-04

## 2022-04-28 RX ORDER — GENTAMICIN SULFATE 40 MG/ML
320 VIAL (ML) INJECTION ONCE
Refills: 0 | Status: COMPLETED | OUTPATIENT
Start: 2022-05-03 | End: 2022-05-03

## 2022-04-28 RX ORDER — ACETAMINOPHEN 500 MG
975 TABLET ORAL ONCE
Refills: 0 | Status: COMPLETED | OUTPATIENT
Start: 2022-05-03 | End: 2022-05-03

## 2022-04-28 NOTE — H&P PST ADULT - LOCATION OF BACK PAIN
I will STOP taking the medications listed below when I get home from the hospital:    metFORMIN 500 mg oral tablet  -- 1 tab(s) by mouth 2 times a day (with meals)    glimepiride 4 mg oral tablet  -- 1 tab(s) by mouth 2 times a day    Januvia 100 mg oral tablet  -- 1 tab(s) by mouth once a day    losartan-hydrochlorothiazide 100mg-12.5mg oral tablet  -- 1 tab(s) by mouth once a day    metoprolol succinate 25 mg oral tablet, extended release  -- 1 tab(s) by mouth once a day lumbar spine

## 2022-04-28 NOTE — H&P PST ADULT - NSICDXPASTMEDICALHX_GEN_ALL_CORE_FT
PAST MEDICAL HISTORY:  Arrhythmia "irregular heartbeat" not sure of specific diagnosis    Essential hypertension     H/O: depression     Hematuria     Kidney mass left side    Malignant neoplasm of endometrium     Obesity     Pure hypercholesterolemia     Sleep apnea has CPAP, stopped using it    Type 2 diabetes mellitus

## 2022-04-28 NOTE — H&P PST ADULT - LAB RESULTS AND INTERPRETATION
4/29/2022: 0800 Pt lab reviewed, Copies of lab results sent to dr. Mckeon's office, spoke to Yoko on 4/28/2022 regarding abnormal lab results 4/29/2022: 0800 Pt lab reviewed, Copies of lab results sent to dr. Mckeon's office, spoke to Yoko on 4/28/2022 regarding abnormal lab results and copies of lab results faxed to Dr. Barrow's office, spoke to Tara in dr Barrow's office today (4/29/2022).

## 2022-04-28 NOTE — H&P PST ADULT - HEIGHT IN FEET
Referral placed to PT per pt request since she does not think about doing exercises at home. EMG ordered to eval carpal tunnel, will refer to neuro if needed based on results.   5

## 2022-04-28 NOTE — H&P PST ADULT - HISTORY OF PRESENT ILLNESS
Pt is a 77 Y/O female, ,  postmenopausal, seen today pre-op for  Pt is a 75 Y/O female, Pt of size, ,  postmenopausal, seen today pre-op for Robotic assisted total laparoscopic hysterectomy, bilateral salpingo-oophorectomy, sentinel lymph node dissection, cystoscopy. Pt with medical hx of HTN, DMT2, HLD, MELIZA(refused to use her CPAP machine), PMB in  and underwent D&C, hysteroscopy with benign final pathology back then.  Pt was referred by Dr. Piotr Duarte(Urologist) for endometrial adenocarcinoma, Patient reports recurrent "bladder infections" and was undergoing work up with Dr. Zhou. Pelvic US from 3/23/22 revealed AV uterus 7.67 x 5.01 x 4.69 cm. Endometrial stripe 7.21 mm, hyperechoic containing multiple cystic structures most consistent in appearance to endometrial hyperplasia. Left adnexal mass noted measuring 5.7 x 3.4 x 3.1 cm. Pt followed up with Dr. Duarte and had subsequent EMB on 3/31/22, which revealed fragments consistent with endometrial adenocarcinoma (FIGO 1) in a background of complex atypical hyperplasia with endometrial polyp present.  Pt today denies unintentional weight loss, denies abdominal or pelvis pain, denies PMB, denies any change in bladder or bowel habits, denies personal and family hx of gynecological cancer. Pt seen today for a scheduled surgery on 5/3/2022 with Dr. hale

## 2022-04-28 NOTE — H&P PST ADULT - NSICDXPASTSURGICALHX_GEN_ALL_CORE_FT
PAST SURGICAL HISTORY:  History of total hip replacement, right 2006    Status post D&C for post menopausal bleeding

## 2022-04-28 NOTE — H&P PST ADULT - PROBLEM SELECTOR PLAN 2
Robotic assisted total laparoscopic hysterectomy, bilateral salpingo-oophorectomy, sentinel lymph node dissection, cystoscopy. F/u with Dr. Antoine for evaluation and clearance Robotic assisted total laparoscopic hysterectomy, bilateral salpingo-oophorectomy, sentinel lymph node dissection, cystoscopy. F/u with Dr. Mckeon for evaluation and clearance

## 2022-04-28 NOTE — H&P PST ADULT - PROBLEM SELECTOR PLAN 3
F/u with cardiologist dr. Antoine for routine management F/u with cardiologist dr. Mckeon for routine management

## 2022-04-28 NOTE — H&P PST ADULT - NSICDXFAMILYHX_GEN_ALL_CORE_FT
FAMILY HISTORY:  Father  Still living? No  Family history of prostate cancer in father, Age at diagnosis: Age Unknown    Mother  Still living? Yes, Estimated age:   Family history of cardiac pacemaker, Age at diagnosis: Age Unknown    Child  Still living? Yes, Estimated age: 41-50  Family history of cardiomyopathy, Age at diagnosis: Age Unknown

## 2022-05-01 PROBLEM — Z01.818 PRE-OP TESTING: Status: ACTIVE | Noted: 2022-05-01

## 2022-05-02 ENCOUNTER — FORM ENCOUNTER (OUTPATIENT)
Age: 77
End: 2022-05-02

## 2022-05-03 ENCOUNTER — INPATIENT (INPATIENT)
Facility: HOSPITAL | Age: 77
LOS: 0 days | Discharge: HOME | DRG: 740 | End: 2022-05-04
Attending: OBSTETRICS & GYNECOLOGY | Admitting: OBSTETRICS & GYNECOLOGY
Payer: MEDICARE

## 2022-05-03 ENCOUNTER — TRANSCRIPTION ENCOUNTER (OUTPATIENT)
Age: 77
End: 2022-05-03

## 2022-05-03 ENCOUNTER — RESULT REVIEW (OUTPATIENT)
Age: 77
End: 2022-05-03

## 2022-05-03 VITALS
HEART RATE: 74 BPM | RESPIRATION RATE: 16 BRPM | WEIGHT: 275.58 LBS | TEMPERATURE: 98 F | DIASTOLIC BLOOD PRESSURE: 65 MMHG | SYSTOLIC BLOOD PRESSURE: 164 MMHG | HEIGHT: 67 IN | OXYGEN SATURATION: 97 %

## 2022-05-03 DIAGNOSIS — Z96.641 PRESENCE OF RIGHT ARTIFICIAL HIP JOINT: Chronic | ICD-10-CM

## 2022-05-03 DIAGNOSIS — R97.1 ELEVATED CANCER ANTIGEN 125 [CA 125]: ICD-10-CM

## 2022-05-03 DIAGNOSIS — Z98.890 OTHER SPECIFIED POSTPROCEDURAL STATES: Chronic | ICD-10-CM

## 2022-05-03 LAB
GLUCOSE BLDC GLUCOMTR-MCNC: 123 MG/DL — HIGH (ref 70–99)
GLUCOSE BLDC GLUCOMTR-MCNC: 149 MG/DL — HIGH (ref 70–99)
GLUCOSE BLDC GLUCOMTR-MCNC: 229 MG/DL — HIGH (ref 70–99)

## 2022-05-03 PROCEDURE — 88307 TISSUE EXAM BY PATHOLOGIST: CPT | Mod: 26

## 2022-05-03 PROCEDURE — 88342 IMHCHEM/IMCYTCHM 1ST ANTB: CPT | Mod: 26

## 2022-05-03 RX ORDER — PANTOPRAZOLE SODIUM 20 MG/1
20 TABLET, DELAYED RELEASE ORAL DAILY
Refills: 0 | Status: DISCONTINUED | OUTPATIENT
Start: 2022-05-04 | End: 2022-05-04

## 2022-05-03 RX ORDER — DEXTROSE 50 % IN WATER 50 %
15 SYRINGE (ML) INTRAVENOUS ONCE
Refills: 0 | Status: DISCONTINUED | OUTPATIENT
Start: 2022-05-04 | End: 2022-05-04

## 2022-05-03 RX ORDER — SIMETHICONE 80 MG/1
80 TABLET, CHEWABLE ORAL EVERY 6 HOURS
Refills: 0 | Status: DISCONTINUED | OUTPATIENT
Start: 2022-05-04 | End: 2022-05-04

## 2022-05-03 RX ORDER — INSULIN LISPRO 100/ML
VIAL (ML) SUBCUTANEOUS
Refills: 0 | Status: DISCONTINUED | OUTPATIENT
Start: 2022-05-03 | End: 2022-05-04

## 2022-05-03 RX ORDER — METFORMIN HYDROCHLORIDE 850 MG/1
0 TABLET ORAL
Qty: 0 | Refills: 0 | DISCHARGE

## 2022-05-03 RX ORDER — DEXAMETHASONE 0.5 MG/5ML
8 ELIXIR ORAL ONCE
Refills: 0 | Status: DISCONTINUED | OUTPATIENT
Start: 2022-05-03 | End: 2022-05-04

## 2022-05-03 RX ORDER — SENNA PLUS 8.6 MG/1
1 TABLET ORAL AT BEDTIME
Refills: 0 | Status: DISCONTINUED | OUTPATIENT
Start: 2022-05-04 | End: 2022-05-04

## 2022-05-03 RX ORDER — SODIUM CHLORIDE 9 MG/ML
1000 INJECTION, SOLUTION INTRAVENOUS
Refills: 0 | Status: DISCONTINUED | OUTPATIENT
Start: 2022-05-04 | End: 2022-05-04

## 2022-05-03 RX ORDER — DEXTROSE 50 % IN WATER 50 %
25 SYRINGE (ML) INTRAVENOUS ONCE
Refills: 0 | Status: DISCONTINUED | OUTPATIENT
Start: 2022-05-04 | End: 2022-05-04

## 2022-05-03 RX ORDER — KETOROLAC TROMETHAMINE 30 MG/ML
30 SYRINGE (ML) INJECTION EVERY 8 HOURS
Refills: 0 | Status: COMPLETED | OUTPATIENT
Start: 2022-05-04 | End: 2022-05-05

## 2022-05-03 RX ORDER — OXYCODONE HYDROCHLORIDE 5 MG/1
5 TABLET ORAL
Refills: 0 | Status: DISCONTINUED | OUTPATIENT
Start: 2022-05-04 | End: 2022-05-04

## 2022-05-03 RX ORDER — ACETAMINOPHEN 500 MG
975 TABLET ORAL EVERY 6 HOURS
Refills: 0 | Status: DISCONTINUED | OUTPATIENT
Start: 2022-05-04 | End: 2022-05-04

## 2022-05-03 RX ORDER — ASPIRIN/CALCIUM CARB/MAGNESIUM 324 MG
1 TABLET ORAL
Qty: 0 | Refills: 0 | DISCHARGE

## 2022-05-03 RX ORDER — ONDANSETRON 8 MG/1
8 TABLET, FILM COATED ORAL EVERY 8 HOURS
Refills: 0 | Status: DISCONTINUED | OUTPATIENT
Start: 2022-05-04 | End: 2022-05-04

## 2022-05-03 RX ORDER — ESCITALOPRAM OXALATE 10 MG/1
0 TABLET, FILM COATED ORAL
Qty: 0 | Refills: 0 | DISCHARGE

## 2022-05-03 RX ORDER — FENTANYL CITRATE 50 UG/ML
50 INJECTION INTRAVENOUS
Refills: 0 | Status: DISCONTINUED | OUTPATIENT
Start: 2022-05-03 | End: 2022-05-04

## 2022-05-03 RX ORDER — ATORVASTATIN CALCIUM 80 MG/1
0 TABLET, FILM COATED ORAL
Qty: 0 | Refills: 0 | DISCHARGE

## 2022-05-03 RX ORDER — ONDANSETRON 8 MG/1
4 TABLET, FILM COATED ORAL EVERY 4 HOURS
Refills: 0 | Status: DISCONTINUED | OUTPATIENT
Start: 2022-05-03 | End: 2022-05-04

## 2022-05-03 RX ORDER — OXYCODONE HYDROCHLORIDE 5 MG/1
10 TABLET ORAL EVERY 4 HOURS
Refills: 0 | Status: DISCONTINUED | OUTPATIENT
Start: 2022-05-04 | End: 2022-05-04

## 2022-05-03 RX ORDER — ACETAMINOPHEN 500 MG
1000 TABLET ORAL EVERY 6 HOURS
Refills: 0 | Status: DISCONTINUED | OUTPATIENT
Start: 2022-05-04 | End: 2022-05-04

## 2022-05-03 RX ORDER — ACETAMINOPHEN 500 MG
1000 TABLET ORAL ONCE
Refills: 0 | Status: COMPLETED | OUTPATIENT
Start: 2022-05-03 | End: 2022-05-03

## 2022-05-03 RX ORDER — SODIUM CHLORIDE 9 MG/ML
1000 INJECTION, SOLUTION INTRAVENOUS
Refills: 0 | Status: DISCONTINUED | OUTPATIENT
Start: 2022-05-03 | End: 2022-05-04

## 2022-05-03 RX ORDER — DEXTROSE 50 % IN WATER 50 %
12.5 SYRINGE (ML) INTRAVENOUS ONCE
Refills: 0 | Status: DISCONTINUED | OUTPATIENT
Start: 2022-05-04 | End: 2022-05-04

## 2022-05-03 RX ORDER — APIXABAN 2.5 MG/1
1 TABLET, FILM COATED ORAL
Qty: 56 | Refills: 0
Start: 2022-05-03 | End: 2022-05-30

## 2022-05-03 RX ORDER — DOCUSATE SODIUM 100 MG
1 CAPSULE ORAL
Qty: 12 | Refills: 0
Start: 2022-05-03 | End: 2022-05-06

## 2022-05-03 RX ORDER — ERGOCALCIFEROL 1.25 MG/1
1 CAPSULE ORAL
Qty: 0 | Refills: 0 | DISCHARGE

## 2022-05-03 RX ORDER — HYDROMORPHONE HYDROCHLORIDE 2 MG/ML
1 INJECTION INTRAMUSCULAR; INTRAVENOUS; SUBCUTANEOUS
Refills: 0 | Status: DISCONTINUED | OUTPATIENT
Start: 2022-05-03 | End: 2022-05-04

## 2022-05-03 RX ORDER — OXYCODONE HYDROCHLORIDE 5 MG/1
5 TABLET ORAL ONCE
Refills: 0 | Status: DISCONTINUED | OUTPATIENT
Start: 2022-05-03 | End: 2022-05-04

## 2022-05-03 RX ORDER — GLUCAGON INJECTION, SOLUTION 0.5 MG/.1ML
1 INJECTION, SOLUTION SUBCUTANEOUS ONCE
Refills: 0 | Status: DISCONTINUED | OUTPATIENT
Start: 2022-05-04 | End: 2022-05-04

## 2022-05-03 RX ADMIN — Medication 500 MILLIGRAM(S): at 17:45

## 2022-05-03 RX ADMIN — Medication 975 MILLIGRAM(S): at 13:39

## 2022-05-03 RX ADMIN — Medication 400 MILLIGRAM(S): at 22:25

## 2022-05-03 RX ADMIN — SODIUM CHLORIDE 75 MILLILITER(S): 9 INJECTION, SOLUTION INTRAVENOUS at 21:07

## 2022-05-03 RX ADMIN — FENTANYL CITRATE 50 MICROGRAM(S): 50 INJECTION INTRAVENOUS at 22:24

## 2022-05-03 RX ADMIN — Medication 975 MILLIGRAM(S): at 21:10

## 2022-05-03 RX ADMIN — FENTANYL CITRATE 50 MICROGRAM(S): 50 INJECTION INTRAVENOUS at 22:30

## 2022-05-03 RX ADMIN — Medication 1000 MILLIGRAM(S): at 22:30

## 2022-05-03 RX ADMIN — Medication 4: at 21:38

## 2022-05-03 NOTE — PROGRESS NOTE ADULT - SUBJECTIVE AND OBJECTIVE BOX
GYNECOLOGIC ONCOLOGY PROGRESS NOTE    POD#0    PROBLEMS:  Endometrial adenocarcinoma  MELIZA  HTN  DM2  GERD  R renal mass  L renal oncocytoma  Squamous cell skin cancer    Pt seen and examined at bedside.     SUBJECTIVE:    Patient is without complaints.  Pain well-controlled.  Flatus: denies  Denies Nausea, Vomiting or Diarrhea.  Denies shortness of breath, chest pain or dyspnea on exertion.  Tolerating ice chips    OBJECTIVE:     VITALS:  T(F): 97.6 (05-04-22 @ 00:09), Max: 98 (05-03-22 @ 13:19)  HR: 84 (05-04-22 @ 00:09) (70 - 84)  BP: 113/69 (05-04-22 @ 00:09) (92/52 - 164/65)  RR: 18 (05-04-22 @ 00:09) (13 - 19)  SpO2: 94% (05-04-22 @ 00:09) (93% - 98%) on 3L O2 by NC      I&O's Summary    03 May 2022 07:01  -  04 May 2022 00:20  --------------------------------------------------------  IN: 1800 mL / OUT: 275 mL / NET: 1525 mL        MEDICATIONS  (STANDING):  acetaminophen     Tablet .. 975 milliGRAM(s) Oral every 6 hours  dextrose 5%. 1000 milliLiter(s) (50 mL/Hr) IV Continuous <Continuous>  dextrose 5%. 1000 milliLiter(s) (100 mL/Hr) IV Continuous <Continuous>  dextrose 50% Injectable 25 Gram(s) IV Push once  dextrose 50% Injectable 12.5 Gram(s) IV Push once  dextrose 50% Injectable 25 Gram(s) IV Push once  glucagon  Injectable 1 milliGRAM(s) IntraMuscular once  insulin lispro (ADMELOG) corrective regimen sliding scale   SubCutaneous three times a day before meals  ketorolac   Injectable 30 milliGRAM(s) IV Push every 8 hours  lactated ringers. 1000 milliLiter(s) (150 mL/Hr) IV Continuous <Continuous>  lactated ringers. 1000 milliLiter(s) (75 mL/Hr) IV Continuous <Continuous>  metroNIDAZOLE  IVPB 500 milliGRAM(s) IV Intermittent Once  pantoprazole    Tablet 20 milliGRAM(s) Oral daily  sodium chloride 0.9% lock flush 3 milliLiter(s) IV Push Once    MEDICATIONS  (PRN):  acetaminophen     Tablet .. 1000 milliGRAM(s) Oral every 6 hours PRN Mild Pain (1 - 3)  dextrose Oral Gel 15 Gram(s) Oral once PRN Blood Glucose LESS THAN 70 milliGRAM(s)/deciliter  fentaNYL    Injectable 25 MICROGram(s) IV Push every 5 minutes PRN Moderate Pain (4 - 6)  ondansetron    Tablet 8 milliGRAM(s) Oral every 8 hours PRN Nausea and/or Vomiting  oxyCODONE    IR 5 milliGRAM(s) Oral every 3 hours PRN Moderate Pain (4 - 6)  oxyCODONE    IR 10 milliGRAM(s) Oral every 4 hours PRN Severe Pain (7 - 10)  senna 1 Tablet(s) Oral at bedtime PRN Constipation  simethicone 80 milliGRAM(s) Chew every 6 hours PRN Gas      Physical Exam:  Constitutional: NAD  Pulmonary: clear to auscultation bilaterally   Cardiovascular: Regular rate and rhythm   Abdomen: soft, appropriately tender, non-distended, normal bowel sounds  Incisions: Clean, dry, intact with dermabond in place.  Without signs of infection or hernia.  : minimal vaginal spotting  Extremities: no lower extremity edema or calve tenderness, Estelita's sign negative.

## 2022-05-03 NOTE — ASU DISCHARGE PLAN (ADULT/PEDIATRIC) - CARE PROVIDER_API CALL
Kirit Barrow)  [facility]  [address] [place] [address]  Phone: [phone]  Fax: [phone]  Follow Up Time:

## 2022-05-03 NOTE — BRIEF OPERATIVE NOTE - NSICDXBRIEFPOSTOP_GEN_ALL_CORE_FT
POST-OP DIAGNOSIS:  Endometrial cancer 03-May-2022 20:40:11  Torrey Gama  Ovarian cyst, left 03-May-2022 20:40:45  Torrey Gama

## 2022-05-03 NOTE — BRIEF OPERATIVE NOTE - NSICDXBRIEFPROCEDURE_GEN_ALL_CORE_FT
PROCEDURES:  Hysterectomy, total, robot-assisted, laparoscopic, with bilateral salpingo-oophorectomy and lymph node sampling 03-May-2022 20:39:50  Torrey Gama  Cystoscopy 03-May-2022 20:39:57  Torrey Gama  Fixation, ligament, uterosacral 03-May-2022 20:40:27  Torrey Gama

## 2022-05-03 NOTE — ASU DISCHARGE PLAN (ADULT/PEDIATRIC) - NS MD DC FALL RISK RISK
For information on Fall & Injury Prevention, visit: [email or web address] OR  [email or web address] OR  https://www.cdc.gov/steadi/patient.html

## 2022-05-03 NOTE — BRIEF OPERATIVE NOTE - NSICDXBRIEFPREOP_GEN_ALL_CORE_FT
PRE-OP DIAGNOSIS:  Ovarian cyst, left 03-May-2022 20:40:48  Torrey Gama  Endometrial cancer 03-May-2022 20:40:07  Torrey Gama

## 2022-05-03 NOTE — BRIEF OPERATIVE NOTE - OPERATION/FINDINGS
grossly normal uterus, left ovarian fibroma?, grossly normal right ovary, grossly normal fallopian tubes, grossly normal abdominal survey.  bilateral ureteral jets noted on cystoscopy. grossly normal cystoscopic exam

## 2022-05-03 NOTE — ASU DISCHARGE PLAN (ADULT/PEDIATRIC) - ASU DC SPECIAL INSTRUCTIONSFT
A PA will call you in 1 week to check in on your recovery.  Follow-up with Dr. Barrow in two weeks to review pathology report.    Please contact your provider for any pain uncontrolled by medication, excessive bleeding or Fever>100.4  Please take aleve-1 tablet every 12 hours x 3 days, may take percocet as prescribed for breakthrough pain. Take Eliquis twice daily x 28 days to prevent blood clots. Please take colace to avoid constipation while on narcotic.     May walk and climb stairs as often as youd like, no vigorous activity, do not lift anything greater than 10lbs, nothing per vagina x 6 weeks, do not drive while on pain medication.

## 2022-05-04 ENCOUNTER — RESULT REVIEW (OUTPATIENT)
Age: 77
End: 2022-05-04

## 2022-05-04 ENCOUNTER — TRANSCRIPTION ENCOUNTER (OUTPATIENT)
Age: 77
End: 2022-05-04

## 2022-05-04 ENCOUNTER — OUTPATIENT (OUTPATIENT)
Dept: OUTPATIENT SERVICES | Facility: HOSPITAL | Age: 77
LOS: 1 days | End: 2022-05-04
Payer: MEDICAID

## 2022-05-04 VITALS
DIASTOLIC BLOOD PRESSURE: 71 MMHG | SYSTOLIC BLOOD PRESSURE: 120 MMHG | RESPIRATION RATE: 18 BRPM | OXYGEN SATURATION: 95 % | HEART RATE: 79 BPM | TEMPERATURE: 99 F

## 2022-05-04 DIAGNOSIS — C54.1 MALIGNANT NEOPLASM OF ENDOMETRIUM: ICD-10-CM

## 2022-05-04 DIAGNOSIS — Z96.641 PRESENCE OF RIGHT ARTIFICIAL HIP JOINT: Chronic | ICD-10-CM

## 2022-05-04 DIAGNOSIS — Z98.890 OTHER SPECIFIED POSTPROCEDURAL STATES: Chronic | ICD-10-CM

## 2022-05-04 LAB
ANION GAP SERPL CALC-SCNC: 15 MMOL/L — SIGNIFICANT CHANGE UP (ref 5–17)
BUN SERPL-MCNC: 34 MG/DL — HIGH (ref 8–20)
CALCIUM SERPL-MCNC: 8.4 MG/DL — LOW (ref 8.6–10.2)
CHLORIDE SERPL-SCNC: 98 MMOL/L — SIGNIFICANT CHANGE UP (ref 98–107)
CO2 SERPL-SCNC: 21 MMOL/L — LOW (ref 22–29)
CREAT SERPL-MCNC: 1.65 MG/DL — HIGH (ref 0.5–1.3)
EGFR: 32 ML/MIN/1.73M2 — LOW
GLUCOSE BLDC GLUCOMTR-MCNC: 300 MG/DL — HIGH (ref 70–99)
GLUCOSE BLDC GLUCOMTR-MCNC: 354 MG/DL — HIGH (ref 70–99)
GLUCOSE SERPL-MCNC: 363 MG/DL — HIGH (ref 70–99)
HCT VFR BLD CALC: 33.8 % — LOW (ref 34.5–45)
HGB BLD-MCNC: 11.1 G/DL — LOW (ref 11.5–15.5)
MAGNESIUM SERPL-MCNC: 1.9 MG/DL — SIGNIFICANT CHANGE UP (ref 1.6–2.6)
MCHC RBC-ENTMCNC: 29.9 PG — SIGNIFICANT CHANGE UP (ref 27–34)
MCHC RBC-ENTMCNC: 32.8 GM/DL — SIGNIFICANT CHANGE UP (ref 32–36)
MCV RBC AUTO: 91.1 FL — SIGNIFICANT CHANGE UP (ref 80–100)
PHOSPHATE SERPL-MCNC: 4 MG/DL — SIGNIFICANT CHANGE UP (ref 2.4–4.7)
PLATELET # BLD AUTO: 232 K/UL — SIGNIFICANT CHANGE UP (ref 150–400)
POTASSIUM SERPL-MCNC: 5 MMOL/L — SIGNIFICANT CHANGE UP (ref 3.5–5.3)
POTASSIUM SERPL-SCNC: 5 MMOL/L — SIGNIFICANT CHANGE UP (ref 3.5–5.3)
RBC # BLD: 3.71 M/UL — LOW (ref 3.8–5.2)
RBC # FLD: 14.9 % — HIGH (ref 10.3–14.5)
SODIUM SERPL-SCNC: 134 MMOL/L — LOW (ref 135–145)
SURGICAL PATHOLOGY STUDY: SIGNIFICANT CHANGE UP
WBC # BLD: 8.75 K/UL — SIGNIFICANT CHANGE UP (ref 3.8–10.5)
WBC # FLD AUTO: 8.75 K/UL — SIGNIFICANT CHANGE UP (ref 3.8–10.5)

## 2022-05-04 PROCEDURE — 84100 ASSAY OF PHOSPHORUS: CPT

## 2022-05-04 PROCEDURE — 88342 IMHCHEM/IMCYTCHM 1ST ANTB: CPT

## 2022-05-04 PROCEDURE — 88321 CONSLTJ&REPRT SLD PREP ELSWR: CPT

## 2022-05-04 PROCEDURE — 85027 COMPLETE CBC AUTOMATED: CPT

## 2022-05-04 PROCEDURE — S2900: CPT

## 2022-05-04 PROCEDURE — 83735 ASSAY OF MAGNESIUM: CPT

## 2022-05-04 PROCEDURE — 88307 TISSUE EXAM BY PATHOLOGIST: CPT

## 2022-05-04 PROCEDURE — 82962 GLUCOSE BLOOD TEST: CPT

## 2022-05-04 PROCEDURE — 80048 BASIC METABOLIC PNL TOTAL CA: CPT

## 2022-05-04 PROCEDURE — 36415 COLL VENOUS BLD VENIPUNCTURE: CPT

## 2022-05-04 RX ORDER — VALSARTAN 80 MG/1
320 TABLET ORAL EVERY 24 HOURS
Refills: 0 | Status: DISCONTINUED | OUTPATIENT
Start: 2022-05-04 | End: 2022-05-04

## 2022-05-04 RX ORDER — SODIUM CHLORIDE 9 MG/ML
1000 INJECTION, SOLUTION INTRAVENOUS
Refills: 0 | Status: DISCONTINUED | OUTPATIENT
Start: 2022-05-04 | End: 2022-05-04

## 2022-05-04 RX ORDER — FENTANYL CITRATE 50 UG/ML
25 INJECTION INTRAVENOUS
Refills: 0 | Status: DISCONTINUED | OUTPATIENT
Start: 2022-05-04 | End: 2022-05-04

## 2022-05-04 RX ORDER — HYDROCHLOROTHIAZIDE 25 MG
12.5 TABLET ORAL EVERY 24 HOURS
Refills: 0 | Status: DISCONTINUED | OUTPATIENT
Start: 2022-05-04 | End: 2022-05-04

## 2022-05-04 RX ADMIN — Medication 6: at 11:04

## 2022-05-04 RX ADMIN — Medication 30 MILLIGRAM(S): at 05:41

## 2022-05-04 RX ADMIN — Medication 975 MILLIGRAM(S): at 05:40

## 2022-05-04 RX ADMIN — Medication 12.5 MILLIGRAM(S): at 09:06

## 2022-05-04 RX ADMIN — Medication 10: at 07:58

## 2022-05-04 RX ADMIN — VALSARTAN 320 MILLIGRAM(S): 80 TABLET ORAL at 09:06

## 2022-05-04 RX ADMIN — Medication 30 MILLIGRAM(S): at 13:05

## 2022-05-04 RX ADMIN — PANTOPRAZOLE SODIUM 20 MILLIGRAM(S): 20 TABLET, DELAYED RELEASE ORAL at 11:01

## 2022-05-04 RX ADMIN — Medication 30 MILLIGRAM(S): at 13:18

## 2022-05-04 RX ADMIN — Medication 30 MILLIGRAM(S): at 05:39

## 2022-05-04 RX ADMIN — Medication 975 MILLIGRAM(S): at 11:00

## 2022-05-04 RX ADMIN — Medication 975 MILLIGRAM(S): at 05:41

## 2022-05-04 RX ADMIN — Medication 975 MILLIGRAM(S): at 11:06

## 2022-05-04 NOTE — PROGRESS NOTE ADULT - SUBJECTIVE AND OBJECTIVE BOX
GYNECOLOGIC ONCOLOGY PROGRESS NOTE    POD#1    PROBLEMS:  Endometrial adenocarcinoma  MELIZA  HTN  DM2  GERD  R renal mass  L renal oncocytoma  Squamous cell skin cancer    Pt seen and examined at bedside.   A line discontinued in PACU overnight  3L NC started due to h/o MELIZA, with continuous pulse ox overnight     SUBJECTIVE:    Patient is without complaints.  Reports pain well-controlled.  Flatus: +  Denies Nausea, Vomiting or Diarrhea.  Denies shortness of breath, chest pain or dyspnea on exertion.  Tolerating PO liquids, solids    OBJECTIVE:     Vital Signs Last 24 Hrs  T(C): 36.6 (04 May 2022 04:39), Max: 36.7 (03 May 2022 13:19)  T(F): 97.8 (04 May 2022 04:39), Max: 98 (03 May 2022 13:19)  HR: 73 (04 May 2022 04:39) (70 - 84)  BP: 123/74 (04 May 2022 04:39) (92/52 - 164/65)  BP(mean): 67 (03 May 2022 23:35) (58 - 68)  RR: 19 (04 May 2022 04:39) (13 - 19)  SpO2: 96% (04 May 2022 04:39) (93% - 98%)        Physical Exam:  Constitutional: NAD  Pulmonary: clear to auscultation bilaterally   Cardiovascular: Regular rate and rhythm   Abdomen: soft, appropriately tender, non-distended, normal bowel sounds  Incisions: Clean, dry, intact with dermabond in place.  Without signs of infection or hernia.  : minimal vaginal spotting  Extremities: no lower extremity edema or calve tenderness, Estelita's sign negative.                          11.1   8.75  )-----------( 232      ( 04 May 2022 05:51 )             33.8

## 2022-05-04 NOTE — DISCHARGE NOTE PROVIDER - NSDCFUADDAPPT_GEN_ALL_CORE_FT
A PA will call you in 1 week to check on your post-op recovery  Follow-up with Dr. Barrow in two weeks for full postop check and to review final pathology

## 2022-05-04 NOTE — DISCHARGE NOTE PROVIDER - NSDCFUADDINST_GEN_ALL_CORE_FT
Please contact your provider for any pain uncontrolled by medication, excessive bleeding or Fever>100.4  Please take naproxen-1 tablet every 12 hours x 3 days, may take percocet as prescribed for breakthrough pain. Please take colace while taking percocet.   Please take eliquis as prescribed to prevent blood clots    May walk and climb stairs as often as youd like, no vigorous activity, do not lift anything greater than 10lbs, nothing per vagina x 6 weeks, do not drive while on pain medication.

## 2022-05-04 NOTE — PATIENT PROFILE ADULT - HEALTH LITERACY
Patient is scheduled for surgery with Dr. Prakash      Spoke or left message with: Spoke with Petey    Date of Surgery: 4/9/20    Location: ASC    Informed patient they will need an adult  Yes    H&P: Patient to schedule with PCP    Additional imaging/appointments: 2 and 6 week post ops    Surgery packet: Given in clinic    Additional comments: Patient will await pre op phone call 1-2 days prior to surgery for arrival time  
no

## 2022-05-04 NOTE — DISCHARGE NOTE PROVIDER - HOSPITAL COURSE
Patient post-operatively had an uncomplicated hospital course. Her pain was well controlled. She is tolerating a regular diet. She is ambulating independently. She was able to void after removal of rose. Labs and Vitals WNL upon discharge.

## 2022-05-04 NOTE — DISCHARGE NOTE PROVIDER - CARE PROVIDER_API CALL
Kirit Barrow)  [facility]  [address] [place] [address]  Phone: [phone]  Fax: [phone]  Follow Up Time:

## 2022-05-04 NOTE — PROGRESS NOTE ADULT - SUBJECTIVE AND OBJECTIVE BOX
Patient seen for afternoon rounds. She reports her pain is well controlled with current regimen. She has been OOB to bathroom multiple times today without any difficulty. She is voiding and tolerating a regular diet without any issues. Patient would like to go home today, encouraged ambulation at home. Dc instructions reviewed with patient, all questions answered.

## 2022-05-04 NOTE — PATIENT PROFILE ADULT - FALL HARM RISK - HARM RISK INTERVENTIONS

## 2022-05-04 NOTE — DISCHARGE NOTE NURSING/CASE MANAGEMENT/SOCIAL WORK - NSDCPEFALRISK_GEN_ALL_CORE
For information on Fall & Injury Prevention, visit: [email or web address] OR  [email or web address] OR  https://www.cdc.gov/steadi/patient.html

## 2022-05-04 NOTE — DISCHARGE NOTE NURSING/CASE MANAGEMENT/SOCIAL WORK - PATIENT PORTAL LINK FT
You can access the FollowMyHealth Patient Portal offered by [facility] by registering at the following website: [email or web address]. By joining [employer]’s FollowMyHealth portal, you will also be able to view your health information using other applications (apps) compatible with our system.

## 2022-05-04 NOTE — DISCHARGE NOTE PROVIDER - NSDCMRMEDTOKEN_GEN_ALL_CORE_FT
ATORVASTATIN 40 MG TABLET: TAKE 1 TABLET BY MOUTH EVERY DAY  Colace 100 mg oral capsule: 1 cap(s) orally 3 times a day   Eliquis 2.5 mg oral tablet: 1 tab(s) orally every 12 hours   ESCITALOPRAM 20 MG TABLET: TAKE 1 TABLET BY MOUTH EVERY DAY  METFORMIN HYDROCHLORIDE  500 MG TABS: tab(s) orally 2 times a day  naproxen 500 mg oral tablet: 1 tab(s) orally 2 times a day   oxycodone-acetaminophen 5 mg-325 mg oral tablet: 1 tab(s) orally every 6 hours, As Needed -for severe pain MDD:4 tablet  VALSARTAN/HYDROCHLOROTHIAZIDE  320-12.5 MG TABS: tab(s) orally once a day

## 2022-05-04 NOTE — DISCHARGE NOTE PROVIDER - CARE PROVIDERS DIRECT ADDRESSES
<<-----Click on this checkbox to enter Procedure Aortogram  05/21/2018  1. US-guided right femoral artery access. 2. Aortagram and selective bilateral renal artery angiogram. 3. Right renal artery balloon angioplasty. 4. Perclose closure.  Active  [email or web address]

## 2022-05-04 NOTE — PATIENT PROFILE ADULT - SAFE PLACE TO LIVE
Patient brought in by EMS for cold and flu symptoms, lightheadedness and weakness. 1 episode of vomiting hx ESRD on dialysis. code sepsis called in triage. no

## 2022-05-04 NOTE — PROGRESS NOTE ADULT - ASSESSMENT
A/P  77yo POD#0 s/p RA TLH, BSO, SLND, uterosacral ligament suspension and cystoscopy    Cancer type: Endometrial adenocarcinoma. Squamous cell skin cancer  Gen: VSS  Neuro: Pain well controlled on current regimen  Psych: H/o depression. Continue home meds  CV: H/o HTN, currently normotensive. Plan to resume home antihypertensives in AM  Pulm: H/o MELIZA. Currently requiring 3L O2 on NC. Continuous pulse ox overnight. Continue O2 as needed. Incentive spirometer use encouraged  GI: Bowel sounds normal, tolerating PO ice chips. Will attempt regular diet in am  : Woodruff removed, void pending  Heme: Labs in AM  Endocrine: H/o DM2. Will hold home metformin while inpatient. Consistent carbohydrate diet. Insulin sliding scale.   ID: S/p perioperative prophylactic antibiotics  DVT ppx: ambulation encouraged, SCDs when in bed  Dispo: Inpatient care overnight. Plan for discharge in AM
77yo POD#1 s/p RA TLH, BSO, SLND, uterosacral ligament fixation and cystoscopy    Cancer type: Endometrial adenocarcinoma (FIGO1). Squamous cell skin cancer  Gen: VSS  Neuro: Pain well controlled on current regimen  Psych: H/o depression. Continue home meds  CV: H/o HTN, currently normotensive. Plan to resume home antihypertensives in AM  Pulm: H/o MELIZA. Required 3L O2 on NC overnight with continuous pulse ox overnight, will discontinue while awake if SpO2 >94%. Incentive spirometer use encouraged  GI: Bowel sounds normal, tolerating PO regular diet, +flatus  : Woodruff removed, voided  Heme: Hgb 14 > 11  Endocrine: H/o DM2. Will hold home metformin while inpatient. Consistent carbohydrate diet. Insulin sliding scale.   ID: S/p perioperative prophylactic antibiotics  DVT ppx: ambulation encouraged, SCDs when in bed  Dispo: Will discuss discharge planning this AM      discussed with attending Dr Barrow

## 2022-05-05 PROBLEM — Z86.59 PERSONAL HISTORY OF OTHER MENTAL AND BEHAVIORAL DISORDERS: Chronic | Status: ACTIVE | Noted: 2022-04-28

## 2022-05-05 PROBLEM — C54.1 MALIGNANT NEOPLASM OF ENDOMETRIUM: Chronic | Status: ACTIVE | Noted: 2022-04-28

## 2022-05-06 NOTE — PATIENT PROFILE ADULT - NSPROSPHOSPCHAPLAINYN_GEN_A_NUR
yes Same Histology In Subsequent Stages Text: The pattern and morphology of the tumor is as described in the first stage.

## 2022-05-10 ENCOUNTER — NON-APPOINTMENT (OUTPATIENT)
Age: 77
End: 2022-05-10

## 2022-05-12 ENCOUNTER — INPATIENT (INPATIENT)
Facility: HOSPITAL | Age: 77
LOS: 3 days | Discharge: HOME | DRG: 872 | End: 2022-05-16
Attending: FAMILY MEDICINE | Admitting: INTERNAL MEDICINE
Payer: MEDICARE

## 2022-05-12 VITALS
DIASTOLIC BLOOD PRESSURE: 56 MMHG | TEMPERATURE: 99 F | OXYGEN SATURATION: 96 % | RESPIRATION RATE: 17 BRPM | HEART RATE: 73 BPM | WEIGHT: 270.07 LBS | SYSTOLIC BLOOD PRESSURE: 115 MMHG | HEIGHT: 67 IN

## 2022-05-12 DIAGNOSIS — Z90.710 ACQUIRED ABSENCE OF BOTH CERVIX AND UTERUS: Chronic | ICD-10-CM

## 2022-05-12 LAB
ALBUMIN SERPL ELPH-MCNC: 3.6 G/DL — SIGNIFICANT CHANGE UP (ref 3.3–5)
ALP SERPL-CCNC: 78 U/L — SIGNIFICANT CHANGE UP (ref 40–120)
ALT FLD-CCNC: 23 U/L — SIGNIFICANT CHANGE UP (ref 12–78)
ANION GAP SERPL CALC-SCNC: 10 MMOL/L — SIGNIFICANT CHANGE UP (ref 5–17)
APPEARANCE UR: ABNORMAL
APTT BLD: 32.4 SEC — SIGNIFICANT CHANGE UP (ref 27.5–35.5)
AST SERPL-CCNC: 23 U/L — SIGNIFICANT CHANGE UP (ref 15–37)
BASOPHILS # BLD AUTO: 0.07 K/UL — SIGNIFICANT CHANGE UP (ref 0–0.2)
BASOPHILS NFR BLD AUTO: 0.5 % — SIGNIFICANT CHANGE UP (ref 0–2)
BILIRUB SERPL-MCNC: 0.5 MG/DL — SIGNIFICANT CHANGE UP (ref 0.2–1.2)
BILIRUB UR-MCNC: ABNORMAL
BUN SERPL-MCNC: 42 MG/DL — HIGH (ref 7–23)
CALCIUM SERPL-MCNC: 9.2 MG/DL — SIGNIFICANT CHANGE UP (ref 8.5–10.1)
CHLORIDE SERPL-SCNC: 107 MMOL/L — SIGNIFICANT CHANGE UP (ref 96–108)
CO2 SERPL-SCNC: 24 MMOL/L — SIGNIFICANT CHANGE UP (ref 22–31)
COLOR SPEC: YELLOW — SIGNIFICANT CHANGE UP
CREAT SERPL-MCNC: 2.33 MG/DL — HIGH (ref 0.5–1.3)
DIFF PNL FLD: ABNORMAL
EGFR: 21 ML/MIN/1.73M2 — LOW
EOSINOPHIL # BLD AUTO: 0.43 K/UL — SIGNIFICANT CHANGE UP (ref 0–0.5)
EOSINOPHIL NFR BLD AUTO: 3.1 % — SIGNIFICANT CHANGE UP (ref 0–6)
GLUCOSE SERPL-MCNC: 187 MG/DL — HIGH (ref 70–99)
GLUCOSE UR QL: NEGATIVE — SIGNIFICANT CHANGE UP
HCT VFR BLD CALC: 37.2 % — SIGNIFICANT CHANGE UP (ref 34.5–45)
HGB BLD-MCNC: 11.8 G/DL — SIGNIFICANT CHANGE UP (ref 11.5–15.5)
IMM GRANULOCYTES NFR BLD AUTO: 0.6 % — SIGNIFICANT CHANGE UP (ref 0–1.5)
INR BLD: 1.45 RATIO — HIGH (ref 0.88–1.16)
KETONES UR-MCNC: ABNORMAL
LACTATE SERPL-SCNC: 2.7 MMOL/L — HIGH (ref 0.7–2)
LEUKOCYTE ESTERASE UR-ACNC: ABNORMAL
LIDOCAIN IGE QN: 145 U/L — SIGNIFICANT CHANGE UP (ref 73–393)
LYMPHOCYTES # BLD AUTO: 15.1 % — SIGNIFICANT CHANGE UP (ref 13–44)
LYMPHOCYTES # BLD AUTO: 2.09 K/UL — SIGNIFICANT CHANGE UP (ref 1–3.3)
MCHC RBC-ENTMCNC: 29.1 PG — SIGNIFICANT CHANGE UP (ref 27–34)
MCHC RBC-ENTMCNC: 31.7 GM/DL — LOW (ref 32–36)
MCV RBC AUTO: 91.6 FL — SIGNIFICANT CHANGE UP (ref 80–100)
MONOCYTES # BLD AUTO: 0.81 K/UL — SIGNIFICANT CHANGE UP (ref 0–0.9)
MONOCYTES NFR BLD AUTO: 5.9 % — SIGNIFICANT CHANGE UP (ref 2–14)
NEUTROPHILS # BLD AUTO: 10.36 K/UL — HIGH (ref 1.8–7.4)
NEUTROPHILS NFR BLD AUTO: 74.8 % — SIGNIFICANT CHANGE UP (ref 43–77)
NITRITE UR-MCNC: NEGATIVE — SIGNIFICANT CHANGE UP
PH UR: 5 — SIGNIFICANT CHANGE UP (ref 5–8)
PLATELET # BLD AUTO: 315 K/UL — SIGNIFICANT CHANGE UP (ref 150–400)
POTASSIUM SERPL-MCNC: 4.3 MMOL/L — SIGNIFICANT CHANGE UP (ref 3.5–5.3)
POTASSIUM SERPL-SCNC: 4.3 MMOL/L — SIGNIFICANT CHANGE UP (ref 3.5–5.3)
PROT SERPL-MCNC: 7.1 GM/DL — SIGNIFICANT CHANGE UP (ref 6–8.3)
PROT UR-MCNC: 100
PROTHROM AB SERPL-ACNC: 16.9 SEC — HIGH (ref 10.5–13.4)
RBC # BLD: 4.06 M/UL — SIGNIFICANT CHANGE UP (ref 3.8–5.2)
RBC # FLD: 15 % — HIGH (ref 10.3–14.5)
SODIUM SERPL-SCNC: 141 MMOL/L — SIGNIFICANT CHANGE UP (ref 135–145)
SP GR SPEC: 1.02 — SIGNIFICANT CHANGE UP (ref 1.01–1.02)
TROPONIN I, HIGH SENSITIVITY RESULT: 7.98 NG/L — SIGNIFICANT CHANGE UP
UROBILINOGEN FLD QL: NEGATIVE — SIGNIFICANT CHANGE UP
WBC # BLD: 13.84 K/UL — HIGH (ref 3.8–10.5)
WBC # FLD AUTO: 13.84 K/UL — HIGH (ref 3.8–10.5)

## 2022-05-12 PROCEDURE — 70450 CT HEAD/BRAIN W/O DYE: CPT | Mod: 26,MA

## 2022-05-12 PROCEDURE — 72125 CT NECK SPINE W/O DYE: CPT | Mod: 26,MA

## 2022-05-12 PROCEDURE — 93010 ELECTROCARDIOGRAM REPORT: CPT

## 2022-05-12 PROCEDURE — 99285 EMERGENCY DEPT VISIT HI MDM: CPT

## 2022-05-12 RX ORDER — CEFTRIAXONE 500 MG/1
1000 INJECTION, POWDER, FOR SOLUTION INTRAMUSCULAR; INTRAVENOUS ONCE
Refills: 0 | Status: COMPLETED | OUTPATIENT
Start: 2022-05-12 | End: 2022-05-12

## 2022-05-12 RX ORDER — SODIUM CHLORIDE 9 MG/ML
1000 INJECTION INTRAMUSCULAR; INTRAVENOUS; SUBCUTANEOUS ONCE
Refills: 0 | Status: COMPLETED | OUTPATIENT
Start: 2022-05-12 | End: 2022-05-12

## 2022-05-12 RX ADMIN — SODIUM CHLORIDE 1000 MILLILITER(S): 9 INJECTION INTRAMUSCULAR; INTRAVENOUS; SUBCUTANEOUS at 23:12

## 2022-05-12 NOTE — ED PROVIDER NOTE - OBJECTIVE STATEMENT
76F with PMHx of Endometrial adenocarcinoma, MELIZA, HTN, DM2, GERD, R renal mass, L renal oncocytoma, Squamous cell skin cancer s/p hysterectomy at [facility] on 5/3/22. Today patient was on the toilet and felt lightheaded, falling forward, to the floor. also had a large bloody BM at the time. unknown if she hit her head but she is on Eliquis. Patient AAOx3 in [facility]. NA activated. 76F with PMHx of Endometrial adenocarcinoma, MELIZA, HTN, DM2, GERD, R renal mass, L renal oncocytoma, Squamous cell skin cancer s/p hysterectomy at [facility] on 5/3/22. Today patient was on the toilet and felt lightheaded, falling to the side and hit her head on the wall and then fell to the floor. also had a large bloody BM at the time. unknown if she hit her head but she is on Eliquis. Patient AAOx3 in [facility]. NA activated.

## 2022-05-12 NOTE — ED PROVIDER NOTE - CARE PLAN
Principal Discharge DX:	Acute kidney injury superimposed on CKD  Secondary Diagnosis:	Syncope  Secondary Diagnosis:	Acute UTI   1

## 2022-05-12 NOTE — ED ADULT TRIAGE NOTE - CHIEF COMPLAINT QUOTE
Pt BIBEMS from home, s/p total hysterectomy at [facility] 1 week ago, c/o syncopal episode while on the toilet. Unwitnessed. Unknown head strike or LOC. On Eliquis. As per EMS, pt had episode of bloody stool. MD Benavidez aware. Neuro alert called. Brought directly to CT scan.

## 2022-05-12 NOTE — ED PROVIDER NOTE - NEURO NEGATIVE STATEMENT, MLM
+ loss of consciousness fro a few mins, no gait abnormality, no headache, no sensory deficits, and no weakness.

## 2022-05-12 NOTE — ED PROVIDER NOTE - NSICDXPASTMEDICALHX_GEN_ALL_CORE_FT
PAST MEDICAL HISTORY:  DM (diabetes mellitus)     Endometrial cancer     HTN (hypertension)     MELIZA (obstructive sleep apnea)     Renal mass

## 2022-05-12 NOTE — ED PROVIDER NOTE - PROGRESS NOTE DETAILS
Reema HERNANDEZ: chalo negative in ED ([address], exp 1/31/23, qc check +0 Reema HERNANDEZ: moriah MR #[id number] from [facility]. patient with LUAN on CKD and syncope, + UTI. will admit. sign out given to Dr. Mix. PAGE.

## 2022-05-13 DIAGNOSIS — N18.9 CHRONIC KIDNEY DISEASE, UNSPECIFIED: ICD-10-CM

## 2022-05-13 LAB
ADD ON TEST-SPECIMEN IN LAB: SIGNIFICANT CHANGE UP
ALBUMIN SERPL ELPH-MCNC: 2.9 G/DL — LOW (ref 3.3–5)
ALP SERPL-CCNC: 71 U/L — SIGNIFICANT CHANGE UP (ref 40–120)
ALT FLD-CCNC: 19 U/L — SIGNIFICANT CHANGE UP (ref 12–78)
ANION GAP SERPL CALC-SCNC: 8 MMOL/L — SIGNIFICANT CHANGE UP (ref 5–17)
AST SERPL-CCNC: 12 U/L — LOW (ref 15–37)
BASOPHILS # BLD AUTO: 0.05 K/UL — SIGNIFICANT CHANGE UP (ref 0–0.2)
BASOPHILS NFR BLD AUTO: 0.5 % — SIGNIFICANT CHANGE UP (ref 0–2)
BILIRUB SERPL-MCNC: 0.3 MG/DL — SIGNIFICANT CHANGE UP (ref 0.2–1.2)
BUN SERPL-MCNC: 44 MG/DL — HIGH (ref 7–23)
CALCIUM SERPL-MCNC: 8.3 MG/DL — LOW (ref 8.5–10.1)
CHLORIDE SERPL-SCNC: 112 MMOL/L — HIGH (ref 96–108)
CO2 SERPL-SCNC: 21 MMOL/L — LOW (ref 22–31)
CREAT SERPL-MCNC: 2.03 MG/DL — HIGH (ref 0.5–1.3)
EGFR: 25 ML/MIN/1.73M2 — LOW
EOSINOPHIL # BLD AUTO: 0.34 K/UL — SIGNIFICANT CHANGE UP (ref 0–0.5)
EOSINOPHIL NFR BLD AUTO: 3.1 % — SIGNIFICANT CHANGE UP (ref 0–6)
FLUAV AG NPH QL: SIGNIFICANT CHANGE UP
FLUBV AG NPH QL: SIGNIFICANT CHANGE UP
GLUCOSE SERPL-MCNC: 130 MG/DL — HIGH (ref 70–99)
HCT VFR BLD CALC: 30.8 % — LOW (ref 34.5–45)
HGB BLD-MCNC: 9.8 G/DL — LOW (ref 11.5–15.5)
IMM GRANULOCYTES NFR BLD AUTO: 0.6 % — SIGNIFICANT CHANGE UP (ref 0–1.5)
LACTATE SERPL-SCNC: 0.7 MMOL/L — SIGNIFICANT CHANGE UP (ref 0.7–2)
LYMPHOCYTES # BLD AUTO: 1.71 K/UL — SIGNIFICANT CHANGE UP (ref 1–3.3)
LYMPHOCYTES # BLD AUTO: 15.8 % — SIGNIFICANT CHANGE UP (ref 13–44)
MCHC RBC-ENTMCNC: 29.4 PG — SIGNIFICANT CHANGE UP (ref 27–34)
MCHC RBC-ENTMCNC: 31.8 GM/DL — LOW (ref 32–36)
MCV RBC AUTO: 92.5 FL — SIGNIFICANT CHANGE UP (ref 80–100)
MONOCYTES # BLD AUTO: 0.81 K/UL — SIGNIFICANT CHANGE UP (ref 0–0.9)
MONOCYTES NFR BLD AUTO: 7.5 % — SIGNIFICANT CHANGE UP (ref 2–14)
NEUTROPHILS # BLD AUTO: 7.83 K/UL — HIGH (ref 1.8–7.4)
NEUTROPHILS NFR BLD AUTO: 72.5 % — SIGNIFICANT CHANGE UP (ref 43–77)
PLATELET # BLD AUTO: 254 K/UL — SIGNIFICANT CHANGE UP (ref 150–400)
POTASSIUM SERPL-MCNC: 4.1 MMOL/L — SIGNIFICANT CHANGE UP (ref 3.5–5.3)
POTASSIUM SERPL-SCNC: 4.1 MMOL/L — SIGNIFICANT CHANGE UP (ref 3.5–5.3)
PROT SERPL-MCNC: 6.1 GM/DL — SIGNIFICANT CHANGE UP (ref 6–8.3)
RBC # BLD: 3.33 M/UL — LOW (ref 3.8–5.2)
RBC # FLD: 15.2 % — HIGH (ref 10.3–14.5)
RSV RNA NPH QL NAA+NON-PROBE: SIGNIFICANT CHANGE UP
SARS-COV-2 RNA SPEC QL NAA+PROBE: SIGNIFICANT CHANGE UP
SODIUM SERPL-SCNC: 141 MMOL/L — SIGNIFICANT CHANGE UP (ref 135–145)
SURGICAL PATHOLOGY STUDY: SIGNIFICANT CHANGE UP
WBC # BLD: 10.81 K/UL — HIGH (ref 3.8–10.5)
WBC # FLD AUTO: 10.81 K/UL — HIGH (ref 3.8–10.5)

## 2022-05-13 PROCEDURE — 36415 COLL VENOUS BLD VENIPUNCTURE: CPT

## 2022-05-13 PROCEDURE — 95816 EEG AWAKE AND DROWSY: CPT

## 2022-05-13 PROCEDURE — 74176 CT ABD & PELVIS W/O CONTRAST: CPT

## 2022-05-13 PROCEDURE — 84443 ASSAY THYROID STIM HORMONE: CPT

## 2022-05-13 PROCEDURE — 83036 HEMOGLOBIN GLYCOSYLATED A1C: CPT

## 2022-05-13 PROCEDURE — 99223 1ST HOSP IP/OBS HIGH 75: CPT

## 2022-05-13 PROCEDURE — 80053 COMPREHEN METABOLIC PANEL: CPT

## 2022-05-13 PROCEDURE — 87086 URINE CULTURE/COLONY COUNT: CPT

## 2022-05-13 PROCEDURE — 86803 HEPATITIS C AB TEST: CPT

## 2022-05-13 PROCEDURE — 93306 TTE W/DOPPLER COMPLETE: CPT

## 2022-05-13 PROCEDURE — 82962 GLUCOSE BLOOD TEST: CPT

## 2022-05-13 PROCEDURE — 83550 IRON BINDING TEST: CPT

## 2022-05-13 PROCEDURE — 82746 ASSAY OF FOLIC ACID SERUM: CPT

## 2022-05-13 PROCEDURE — 97162 PT EVAL MOD COMPLEX 30 MIN: CPT | Mod: GP

## 2022-05-13 PROCEDURE — 87077 CULTURE AEROBIC IDENTIFY: CPT

## 2022-05-13 PROCEDURE — 87186 SC STD MICRODIL/AGAR DIL: CPT

## 2022-05-13 PROCEDURE — 85025 COMPLETE CBC W/AUTO DIFF WBC: CPT

## 2022-05-13 PROCEDURE — 83605 ASSAY OF LACTIC ACID: CPT

## 2022-05-13 PROCEDURE — 80048 BASIC METABOLIC PNL TOTAL CA: CPT

## 2022-05-13 PROCEDURE — 74176 CT ABD & PELVIS W/O CONTRAST: CPT | Mod: 26

## 2022-05-13 PROCEDURE — 83540 ASSAY OF IRON: CPT

## 2022-05-13 PROCEDURE — 97116 GAIT TRAINING THERAPY: CPT | Mod: GP

## 2022-05-13 PROCEDURE — 85027 COMPLETE CBC AUTOMATED: CPT

## 2022-05-13 PROCEDURE — 85045 AUTOMATED RETICULOCYTE COUNT: CPT

## 2022-05-13 PROCEDURE — 82607 VITAMIN B-12: CPT

## 2022-05-13 PROCEDURE — 82728 ASSAY OF FERRITIN: CPT

## 2022-05-13 PROCEDURE — 93306 TTE W/DOPPLER COMPLETE: CPT | Mod: 26

## 2022-05-13 RX ORDER — VALSARTAN 80 MG/1
320 TABLET ORAL DAILY
Refills: 0 | Status: DISCONTINUED | OUTPATIENT
Start: 2022-05-13 | End: 2022-05-13

## 2022-05-13 RX ORDER — ACETAMINOPHEN 500 MG
650 TABLET ORAL EVERY 6 HOURS
Refills: 0 | Status: DISCONTINUED | OUTPATIENT
Start: 2022-05-13 | End: 2022-05-16

## 2022-05-13 RX ORDER — SODIUM CHLORIDE 9 MG/ML
1000 INJECTION INTRAMUSCULAR; INTRAVENOUS; SUBCUTANEOUS ONCE
Refills: 0 | Status: COMPLETED | OUTPATIENT
Start: 2022-05-13 | End: 2022-05-13

## 2022-05-13 RX ORDER — INSULIN LISPRO 100/ML
VIAL (ML) SUBCUTANEOUS
Refills: 0 | Status: DISCONTINUED | OUTPATIENT
Start: 2022-05-13 | End: 2022-05-16

## 2022-05-13 RX ORDER — DEXTROSE 50 % IN WATER 50 %
25 SYRINGE (ML) INTRAVENOUS ONCE
Refills: 0 | Status: DISCONTINUED | OUTPATIENT
Start: 2022-05-13 | End: 2022-05-16

## 2022-05-13 RX ORDER — ASPIRIN/CALCIUM CARB/MAGNESIUM 324 MG
81 TABLET ORAL DAILY
Refills: 0 | Status: DISCONTINUED | OUTPATIENT
Start: 2022-05-13 | End: 2022-05-13

## 2022-05-13 RX ORDER — HEPARIN SODIUM 5000 [USP'U]/ML
5000 INJECTION INTRAVENOUS; SUBCUTANEOUS EVERY 12 HOURS
Refills: 0 | Status: DISCONTINUED | OUTPATIENT
Start: 2022-05-13 | End: 2022-05-13

## 2022-05-13 RX ORDER — INSULIN LISPRO 100/ML
VIAL (ML) SUBCUTANEOUS AT BEDTIME
Refills: 0 | Status: DISCONTINUED | OUTPATIENT
Start: 2022-05-13 | End: 2022-05-16

## 2022-05-13 RX ORDER — ATORVASTATIN CALCIUM 80 MG/1
40 TABLET, FILM COATED ORAL AT BEDTIME
Refills: 0 | Status: DISCONTINUED | OUTPATIENT
Start: 2022-05-13 | End: 2022-05-16

## 2022-05-13 RX ORDER — DEXTROSE 50 % IN WATER 50 %
12.5 SYRINGE (ML) INTRAVENOUS ONCE
Refills: 0 | Status: DISCONTINUED | OUTPATIENT
Start: 2022-05-13 | End: 2022-05-16

## 2022-05-13 RX ORDER — ENOXAPARIN SODIUM 100 MG/ML
40 INJECTION SUBCUTANEOUS EVERY 24 HOURS
Refills: 0 | Status: DISCONTINUED | OUTPATIENT
Start: 2022-05-13 | End: 2022-05-13

## 2022-05-13 RX ORDER — APIXABAN 2.5 MG/1
2.5 TABLET, FILM COATED ORAL EVERY 12 HOURS
Refills: 0 | Status: DISCONTINUED | OUTPATIENT
Start: 2022-05-13 | End: 2022-05-16

## 2022-05-13 RX ORDER — SODIUM CHLORIDE 9 MG/ML
1000 INJECTION, SOLUTION INTRAVENOUS
Refills: 0 | Status: DISCONTINUED | OUTPATIENT
Start: 2022-05-13 | End: 2022-05-16

## 2022-05-13 RX ORDER — ONDANSETRON 8 MG/1
4 TABLET, FILM COATED ORAL EVERY 8 HOURS
Refills: 0 | Status: DISCONTINUED | OUTPATIENT
Start: 2022-05-13 | End: 2022-05-16

## 2022-05-13 RX ORDER — GLUCAGON INJECTION, SOLUTION 0.5 MG/.1ML
1 INJECTION, SOLUTION SUBCUTANEOUS ONCE
Refills: 0 | Status: DISCONTINUED | OUTPATIENT
Start: 2022-05-13 | End: 2022-05-16

## 2022-05-13 RX ORDER — ESCITALOPRAM OXALATE 10 MG/1
20 TABLET, FILM COATED ORAL DAILY
Refills: 0 | Status: DISCONTINUED | OUTPATIENT
Start: 2022-05-13 | End: 2022-05-16

## 2022-05-13 RX ORDER — BACITRACIN ZINC 500 UNIT/G
1 OINTMENT IN PACKET (EA) TOPICAL
Refills: 0 | Status: DISCONTINUED | OUTPATIENT
Start: 2022-05-13 | End: 2022-05-16

## 2022-05-13 RX ORDER — DEXTROSE 50 % IN WATER 50 %
15 SYRINGE (ML) INTRAVENOUS ONCE
Refills: 0 | Status: DISCONTINUED | OUTPATIENT
Start: 2022-05-13 | End: 2022-05-16

## 2022-05-13 RX ORDER — CEFTRIAXONE 500 MG/1
1000 INJECTION, POWDER, FOR SOLUTION INTRAMUSCULAR; INTRAVENOUS EVERY 24 HOURS
Refills: 0 | Status: DISCONTINUED | OUTPATIENT
Start: 2022-05-13 | End: 2022-05-16

## 2022-05-13 RX ORDER — LANOLIN ALCOHOL/MO/W.PET/CERES
3 CREAM (GRAM) TOPICAL AT BEDTIME
Refills: 0 | Status: DISCONTINUED | OUTPATIENT
Start: 2022-05-13 | End: 2022-05-16

## 2022-05-13 RX ADMIN — CEFTRIAXONE 100 MILLIGRAM(S): 500 INJECTION, POWDER, FOR SOLUTION INTRAMUSCULAR; INTRAVENOUS at 22:30

## 2022-05-13 RX ADMIN — Medication 1 APPLICATION(S): at 22:50

## 2022-05-13 RX ADMIN — APIXABAN 2.5 MILLIGRAM(S): 2.5 TABLET, FILM COATED ORAL at 22:30

## 2022-05-13 RX ADMIN — CEFTRIAXONE 100 MILLIGRAM(S): 500 INJECTION, POWDER, FOR SOLUTION INTRAMUSCULAR; INTRAVENOUS at 00:03

## 2022-05-13 RX ADMIN — ESCITALOPRAM OXALATE 20 MILLIGRAM(S): 10 TABLET, FILM COATED ORAL at 22:40

## 2022-05-13 RX ADMIN — SODIUM CHLORIDE 1000 MILLILITER(S): 9 INJECTION INTRAMUSCULAR; INTRAVENOUS; SUBCUTANEOUS at 05:30

## 2022-05-13 RX ADMIN — ATORVASTATIN CALCIUM 40 MILLIGRAM(S): 80 TABLET, FILM COATED ORAL at 22:30

## 2022-05-13 RX ADMIN — Medication 1: at 17:53

## 2022-05-13 RX ADMIN — SODIUM CHLORIDE 1000 MILLILITER(S): 9 INJECTION INTRAMUSCULAR; INTRAVENOUS; SUBCUTANEOUS at 00:03

## 2022-05-13 NOTE — ED ADULT NURSE NOTE - NSHOSCREENINGQ1_ED_ALL_ED
Patient called.    He has a dry cough with no sore throat or any other Sx.   x3 days    Patient wants to know if you have any suggestions
Pt advised
No

## 2022-05-13 NOTE — CHART NOTE - NSCHARTNOTEFT_GEN_A_CORE
Called by RN Lauro at 6:07 AM regarding patient hypotension of 103/56. Per RN, patient has received three liters IVF bolus since admission and is asymptomatic. Per RN, patient ambulating well with assistance and stated that she took diuretic and losartan late last night. As patient asymptomatic and in setting of medication use, will hold further IVF boluses for now. Repeat lactate and AM labs order placed.

## 2022-05-13 NOTE — PHYSICAL THERAPY INITIAL EVALUATION ADULT - GENERAL OBSERVATIONS, REHAB EVAL
COVID swab, asymptomatic
Patient received in bed on 3N, just back from echo.  +HM, daughter at bedside.  Patient denied pain.

## 2022-05-13 NOTE — PHYSICAL THERAPY INITIAL EVALUATION ADULT - MODALITIES TREATMENT COMMENTS
Patient left OOB in chair with CBIR. Patient independent in functional mobility, no skilled physical therapy need in this setting.  May ambulate per nursing.  Will d/c from PT. RN, CM informed.

## 2022-05-13 NOTE — H&P ADULT - ASSESSMENT
# Syncope suspect vasovagal   pt. passed out while sitting on toilet   - check orthostatics  - Hold HCTZ - takes combo valsartan HCTZ at home    - Echo  - EEG  - Cardiology and neurology consult     # Anemia r/o GIB   recent hysterectomy due to endometrial CA last week   H dropped from 11.8 to 9.8 pt. on eliquis   pt. reports blood BM at home when she passed out on the toilette   - stool guaiac  - anemia panel  - monitor H/H   - CBC in AM     # LUAN on CKD3  baseline creatinine  is 1.4-1.6  CT abdomen - no renal stones, no hydronephrosis   f/u US kidney and bladder   - avoid nephrotoxic drugs, monitor creatinine   - Nephrology consult - Dr. Lopez     # HTN  - cont valsartan     # S/p Hysterectomy for endometrial CA last week   # robotic surgical site on abdomen with mild redness  - apply bacitracin bid          # Syncope suspect vasovagal   pt. passed out while sitting on toilet   - check orthostatics  - Hold HCTZ - takes combo valsartan HCTZ at home    - Echo  - EEG  - Cardiology and neurology consult     # UTI  # Leukocytosis   had elevated lactate in ED - now wnl   UA - positive   f/u UCx and sensitivity   WBC trending down   cont. IV Rocephin     # Anemia r/o GIB   recent hysterectomy due to endometrial CA last week   H dropped from 11.8 to 9.8 pt. on eliquis   pt. reports blood BM at home when she passed out on the toilette   - stool guaiac  - anemia panel  - monitor H/H   - CBC in AM     # LUAN on CKD3  baseline creatinine  is 1.4-1.6  CT abdomen - no renal stones, no hydronephrosis   f/u US kidney and bladder   - avoid nephrotoxic drugs, monitor creatinine   - Nephrology consult - Dr. Lopez     # Pulmonary nodule - incidental finding on CT abdomen  3 mm right lower lobe pulmonary nodule   pt. with Hx of smoking - quit 18 years ago   follow up CT chest outpatient in 6-12 months     # HTN  - cont valsartan     # S/p Hysterectomy for endometrial CA last week   # robotic surgical site on abdomen with mild redness  - apply bacitracin bid

## 2022-05-13 NOTE — H&P ADULT - HISTORY OF PRESENT ILLNESS
76F with PMHx of Endometrial adenocarcinoma s/p recent hysterectomy at [facility] last week - started on eliquis post op,  MELIZA, HTN, DM2, GERD, R renal mass, L renal oncocytoma, Squamous cell skin cancer s/p hysterectomy at [facility] on 5/3/22. Today patient was on the toilet and felt lightheaded, falling to the side and hit her head on the wall and then fell to the floor, also had a large bloody BM at the time, unknown if she hit her head but she is on Eliquis.

## 2022-05-13 NOTE — H&P ADULT - NSHPLABSRESULTS_GEN_ALL_CORE
Daily Height in cm: 170.18 (13 May 2022 07:33)    Daily                           9.8    10.81 )-----------( 254      ( 13 May 2022 08:05 )             30.8       05-13    141  |  112<H>  |  44<H>  ----------------------------<  130<H>  4.1   |  21<L>  |  2.03<H>    Ca    8.3<L>      13 May 2022 08:05    TPro  6.1  /  Alb  2.9<L>  /  TBili  0.3  /  DBili  x   /  AST  12<L>  /  ALT  19  /  AlkPhos  71  05-13        CAPILLARY BLOOD GLUCOSE          LIVER FUNCTIONS - ( 13 May 2022 08:05 )  Alb: 2.9 g/dL / Pro: 6.1 gm/dL / ALK PHOS: 71 U/L / ALT: 19 U/L / AST: 12 U/L / GGT: x                 PT/INR - ( 12 May 2022 23:03 )   PT: 16.9 sec;   INR: 1.45 ratio         PTT - ( 12 May 2022 23:03 )  PTT:32.4 sec    Radiology: all tests were reviewed    < from: CT Abdomen and Pelvis No Cont (05.13.22 @ 10:25) >      IMPRESSION:  No renal stones or hydronephrosis.  3 mm right lower lobe pulmonary nodule. 1 year follow-up chest CT may be   obtained if there are clinical risk factors.    < end of copied text >    < from: CT Head No Cont (05.12.22 @ 22:45) >      IMPRESSION:    Head CT: No obvious acute intracranial hemorrhage or displaced skull   fracture. Follow-up contrast enhanced MRI may be obtained to assess   intracranial metastasis given history.    Bilateral mastoid opacification. Recommend clinical correlation to assess   acute mastoiditis.    Cervical spine CT: Osteopenia without obvious acute fracture or traumatic   malalignment in the cervical spine. If there is clinical suspicion for   acute fracture or ligamentous/cord injury, MRI may be obtained for  further evaluation.    Heterogeneous thyroid gland, which is obscured by artifact and can be   further assessed by a nonemergent ultrasound as clinically indicated.    < end of copied text >

## 2022-05-13 NOTE — CONSULT NOTE ADULT - SUBJECTIVE AND OBJECTIVE BOX
Patient is a 76y old  Female who presents with a chief complaint of Syncope (13 May 2022 15:07)    HPI:  76F with PMHx of Endometrial adenocarcinoma s/p recent hysterectomy at [facility] on 5/3/22 (was started on eliquis post op), MELIZA, HTN, DM2, GERD, R renal mass, L renal oncocytoma, h/o squamous cell skin cancer, presented to  ED on 5/12/22 with c/o syncopal episode. Patient was on the toilet and felt lightheaded, daughter reports her eyes rolled back and her mouth was shaking, then her eyes widened and she was staring 'blankly.' She then fell from the toilet to the side and hit her head on the wall. She also had a large bloody BM at the time. Patient was taken to ED, has been admitted to telemetry for further monitoring.    Upon evaluation, patient is awake and alert, denies any numbness, tingling, headache, visual changes, changes in speech, dizziness or vertigo. She has been on Eliquis since her hysterectomy. She does admit to a prior syncopal episode years ago, was told it was a vasovagal event. No prior h/o seizures. She denies any post-ictal confusion or urinary incontinence after this episode.     PAST MEDICAL & SURGICAL HISTORY:  DM (diabetes mellitus)  HTN (hypertension)  MELIZA (obstructive sleep apnea)  Endometrial cancer  Renal mass  S/P hysterectomy    FAMILY HISTORY:  Family history of prostate cancer in father (Father)    Social Hx:  Nonsmoker, no drug or alcohol use    MEDICATIONS  (STANDING):  apixaban 2.5 milliGRAM(s) Oral every 12 hours  atorvastatin 40 milliGRAM(s) Oral at bedtime  BACItracin   Ointment 1 Application(s) Topical two times a day  cefTRIAXone   IVPB 1000 milliGRAM(s) IV Intermittent every 24 hours  dextrose 5%. 1000 milliLiter(s) (100 mL/Hr) IV Continuous <Continuous>  dextrose 5%. 1000 milliLiter(s) (50 mL/Hr) IV Continuous <Continuous>  dextrose 50% Injectable 25 Gram(s) IV Push once  dextrose 50% Injectable 12.5 Gram(s) IV Push once  dextrose 50% Injectable 25 Gram(s) IV Push once  escitalopram 20 milliGRAM(s) Oral daily  glucagon  Injectable 1 milliGRAM(s) IntraMuscular once  insulin lispro (ADMELOG) corrective regimen sliding scale   SubCutaneous three times a day before meals  insulin lispro (ADMELOG) corrective regimen sliding scale   SubCutaneous at bedtime  valsartan 320 milliGRAM(s) Oral daily     Allergies:  Penicillins (Unknown)    ROS: Pertinent positives in HPI, all other ROS were reviewed and are negative.      Vital Signs Last 24 Hrs  T(C): 36.8 (13 May 2022 08:56), Max: 37.3 (13 May 2022 05:30)  T(F): 98.3 (13 May 2022 08:56), Max: 99.2 (13 May 2022 05:30)  HR: 68 (13 May 2022 08:56) (64 - 79)  BP: 115/45 (13 May 2022 08:56) (91/45 - 124/43)  BP(mean): 51 (13 May 2022 05:53) (45 - 76)  RR: 20 (13 May 2022 08:56) (17 - 30)  SpO2: 95% (13 May 2022 08:56) (93% - 96%)    Physical Exam:    General: Normocephalic, NAD  HEENT: PERRLA, EOMI,   Neck: Supple.  Respiratory: Breath sounds are clear bilaterally  Cardiovascular: S1 and S2  Gastrointestinal: soft, nontender  Extremities:  no edema  Vascular: Carotid Bruit - no  Musculoskeletal: no joint swelling/tenderness, no abnormal movements  Skin: No rashes    Neurological exam:  HF: A x O x 3. Appropriately interactive, normal affect. Speech fluent, No Aphasia or paraphasic errors. Naming /repetition intact   CN: PRINCESS, EOMI, VFF, facial sensation normal, no NLFD, tongue midline  Motor: No pronator drift, Strength 5/5 in all 4 ext, normal bulk and tone, no tremor  Sens: Intact to light touch   Reflexes: Symmetric and hyporeflexive, downgoing toes b/l  Coord:  No FNFA, dysmetria, RENITA intact   Gait/Balance: Normal/Cannot test    NIHSS: 0    Labs:   05-13    141  |  112<H>  |  44<H>  ----------------------------<  130<H>  4.1   |  21<L>  |  2.03<H>    Ca    8.3<L>      13 May 2022 08:05    TPro  6.1  /  Alb  2.9<L>  /  TBili  0.3  /  DBili  x   /  AST  12<L>  /  ALT  19  /  AlkPhos  71  05-13                        9.8    10.81 )-----------( 254      ( 13 May 2022 08:05 )             30.8       Radiology:  CT Head No Cont (05.12.22 @ 22:45) >  IMPRESSION:    Head CT: No obvious acute intracranial hemorrhage or displaced skull   fracture. Follow-up contrast enhanced MRI may be obtained to assess   intracranial metastasis given history.

## 2022-05-13 NOTE — CONSULT NOTE ADULT - SUBJECTIVE AND OBJECTIVE BOX
76F with PMHx of Endometrial adenocarcinoma s/p recent hysterectomy at [facility] last week - started on eliquis post op,  MELIZA, HTN, DM2, GERD, s/p left renal partial nephrectomy sec to left renal oncocytoma (2018 )      Endometrial cancer - /p hysterectomy at [facility] on 5/3/22.Cr 1.4 - 1.6 during past admission       Today patient was on the toilet and felt lightheaded, falling to the side and hit her head on the wall and then fell to the floor, also had a large bloody BM at the time, unknown if she hit her head but she is on Eliquis.   Renal eval now for LUAN on / CKD- Cr 2.33      today    pt feeling ok    no sob or cp       PAST MEDICAL & SURGICAL HISTORY:  DM (diabetes mellitus)      HTN (hypertension)      MELIZA (obstructive sleep apnea)      Endometrial cancer      Renal mass      S/P hysterectomy      Home Medications:  atorvastatin 40 mg oral tablet: 1 tab(s) orally once a day (13 May 2022 15:20)  Eliquis 2.5 mg oral tablet: 1 tab(s) orally 2 times a day (13 May 2022 15:20)  Lexapro 20 mg oral tablet: 1 tab(s) orally once a day (13 May 2022 15:20)  metFORMIN 500 mg oral tablet, extended release: 1 tab(s) orally once a day (13 May 2022 15:20)  valsartan-hydrochlorothiazide 320 mg-12.5 mg oral tablet: 1 tab(s) orally once a day (13 May 2022 15:20)      MEDICATIONS  (STANDING):  apixaban 2.5 milliGRAM(s) Oral every 12 hours  atorvastatin 40 milliGRAM(s) Oral at bedtime  BACItracin   Ointment 1 Application(s) Topical two times a day  cefTRIAXone   IVPB 1000 milliGRAM(s) IV Intermittent every 24 hours  dextrose 5%. 1000 milliLiter(s) (100 mL/Hr) IV Continuous <Continuous>  dextrose 5%. 1000 milliLiter(s) (50 mL/Hr) IV Continuous <Continuous>  dextrose 50% Injectable 25 Gram(s) IV Push once  dextrose 50% Injectable 12.5 Gram(s) IV Push once  dextrose 50% Injectable 25 Gram(s) IV Push once  escitalopram 20 milliGRAM(s) Oral daily  glucagon  Injectable 1 milliGRAM(s) IntraMuscular once  insulin lispro (ADMELOG) corrective regimen sliding scale   SubCutaneous three times a day before meals  insulin lispro (ADMELOG) corrective regimen sliding scale   SubCutaneous at bedtime  valsartan 320 milliGRAM(s) Oral daily      Allergies    penicillins (Unknown)    Intolerances        SOCIAL HISTORY:  Denies ETOh,Smoking,     FAMILY HISTORY:  Family history of prostate cancer in father (Father)        REVIEW OF SYSTEMS:    CONSTITUTIONAL: No weakness, fevers or chills  EYES/ENT: No visual changes;  No vertigo or throat pain   NECK: No pain or stiffness  RESPIRATORY: No cough, wheezing, hemoptysis; No shortness of breath  CARDIOVASCULAR: No chest pain or palpitations  GASTROINTESTINAL: No abdominal or epigastric pain. No nausea, vomiting, or hematemesis; No diarrhea or constipation. No melena or hematochezia.  GENITOURINARY: No dysuria, frequency or hematuria  NEUROLOGICAL: No numbness or weakness  SKIN: No itching, burning, rashes, or lesions   All other review of systems is negative unless indicated above.    VITAL:  Vital Signs Last 24 Hrs  T(C): 36.8 (13 May 2022 09:04), Max: 37.3 (13 May 2022 05:30)  T(F): 98.3 (13 May 2022 09:04), Max: 99.2 (13 May 2022 05:30)  HR: 70 (13 May 2022 09:04) (64 - 79)  BP: 128/48 (13 May 2022 09:04) (91/45 - 128/48)  BP(mean): 51 (13 May 2022 05:53) (45 - 76)  RR: 20 (13 May 2022 08:56) (17 - 30)  SpO2: 96% (13 May 2022 09:04) (93% - 96%)I and O's:        PHYSICAL EXAM:    Constitutional: NAD  HEENT: EOMI,  MMM  Neck: No LAD, No JVD  Respiratory: CTAB  Cardiovascular: S1 and S2  Gastrointestinal: BS+, soft, NT/ND  Extremities: No peripheral edema  Neurological: A/O x 3, n  : No Woodruff  Skin: No rashes  Access: Not applicable    LABS:                                   9.8    10.81 )-----------( 254      ( 13 May 2022 08:05 )             30.8                         11.8   13.84 )-----------( 315      ( 12 May 2022 23:03 )             37.2         141    |  112    |  44     ----------------------------<  130       13 May 2022 08:05  4.1     |  21     |  2.03     141    |  107    |  42     ----------------------------<  187       12 May 2022 23:03  4.3     |  24     |  2.33     Ca    8.3        13 May 2022 08:05  Ca    9.2        12 May 2022 23:03            Urine Studies:  Urinalysis Basic - ( 12 May 2022 23:03 )    Color: Yellow / Appearance: very cloudy / S.025 / pH: x  Gluc: x / Ketone: Trace  / Bili: Small / Urobili: Negative   Blood: x / Protein: 100 / Nitrite: Negative   Leuk Esterase: Moderate / RBC: 3-5 /HPF / WBC >50   Sq Epi: x / Non Sq Epi: Moderate / Bacteria: Occasional      RADIOLOGY & ADDITIONAL STUDIES:  ER: Within normal limits.  BILE DUCTS: Normal caliber.  GALLBLADDER: Within normal limits.  SPLEEN: Within normal limits.  PANCREAS: Within normal limits.  ADRENALS: Within normal limits.  KIDNEYS/URETERS: No renal stones or hydronephrosis.    BLADDER: Minimally distended.  REPRODUCTIVE ORGANS: Hysterectomy.    BOWEL: No bowel obstruction. Appendix is within normal limits. Colonic   diverticulosis.  PERITONEUM: Trace pelvic fluid.  VESSELS: Atherosclerotic calcifications.  RETROPERITONEUM/LYMPH NODES: No lymphadenopathy.  ABDOMINAL WALL: Within normal limits.  BONES: Degenerative changes. Right hip arthroplasty.    IMPRESSION:  No renal stones or hydronephrosis.  3 mm right lower lobe pulmonary nodule. 1 year follow-up chest CT may be   obtained if there are clinical risk factors.      --- End of Report ---                    76F with PMHx of Endometrial adenocarcinoma s/p recent hysterectomy at [facility] last week - started on eliquis post op,  MELIZA, HTN, DM2, GERD, s/p left renal partial nephrectomy sec to left renal oncocytoma (2018 )      Endometrial cancer - /p hysterectomy at [facility] on 5/3/22.Cr 1.4 - 1.6 during past admission       Today patient was on the toilet and felt lightheaded, falling to the side and hit her head on the wall and then fell to the floor, also had a large bloody BM at the time, unknown if she hit her head but she is on Eliquis.   Renal eval now for LUAN on / CKD- Cr 2.33      today    pt feeling ok    no sob or cp       PAST MEDICAL & SURGICAL HISTORY:  DM (diabetes mellitus)      HTN (hypertension)      MELIZA (obstructive sleep apnea)      Endometrial cancer      Renal mass      S/P hysterectomy      Home Medications:  atorvastatin 40 mg oral tablet: 1 tab(s) orally once a day (13 May 2022 15:20)  Eliquis 2.5 mg oral tablet: 1 tab(s) orally 2 times a day (13 May 2022 15:20)  Lexapro 20 mg oral tablet: 1 tab(s) orally once a day (13 May 2022 15:20)  metFORMIN 500 mg oral tablet, extended release: 1 tab(s) orally once a day (13 May 2022 15:20)  valsartan-hydrochlorothiazide 320 mg-12.5 mg oral tablet: 1 tab(s) orally once a day (13 May 2022 15:20)      MEDICATIONS  (STANDING):  apixaban 2.5 milliGRAM(s) Oral every 12 hours  atorvastatin 40 milliGRAM(s) Oral at bedtime  BACItracin   Ointment 1 Application(s) Topical two times a day  cefTRIAXone   IVPB 1000 milliGRAM(s) IV Intermittent every 24 hours  dextrose 5%. 1000 milliLiter(s) (100 mL/Hr) IV Continuous <Continuous>  dextrose 5%. 1000 milliLiter(s) (50 mL/Hr) IV Continuous <Continuous>  dextrose 50% Injectable 25 Gram(s) IV Push once  dextrose 50% Injectable 12.5 Gram(s) IV Push once  dextrose 50% Injectable 25 Gram(s) IV Push once  escitalopram 20 milliGRAM(s) Oral daily  glucagon  Injectable 1 milliGRAM(s) IntraMuscular once  insulin lispro (ADMELOG) corrective regimen sliding scale   SubCutaneous three times a day before meals  insulin lispro (ADMELOG) corrective regimen sliding scale   SubCutaneous at bedtime  valsartan 320 milliGRAM(s) Oral daily      Allergies    penicillins (Unknown)    Intolerances        SOCIAL HISTORY:  Denies ETOh,Smoking,     FAMILY HISTORY:  Family history of prostate cancer in father (Father)        REVIEW OF SYSTEMS:    CONSTITUTIONAL: No weakness, fevers or chills  EYES/ENT: No visual changes;  No vertigo or throat pain   NECK: No pain or stiffness  RESPIRATORY: No cough, wheezing, hemoptysis; No shortness of breath  CARDIOVASCULAR: No chest pain or palpitations  GASTROINTESTINAL: No abdominal or epigastric pain. No nausea, vomiting, or hematemesis; No diarrhea or constipation. No melena or hematochezia.  GENITOURINARY: No dysuria, frequency or hematuria  NEUROLOGICAL: No numbness or weakness  SKIN: No itching, burning, rashes, or lesions   All other review of systems is negative unless indicated above.    VITAL:  Vital Signs Last 24 Hrs  T(C): 36.8 (13 May 2022 09:04), Max: 37.3 (13 May 2022 05:30)  T(F): 98.3 (13 May 2022 09:04), Max: 99.2 (13 May 2022 05:30)  HR: 70 (13 May 2022 09:04) (64 - 79)  BP: 128/48 (13 May 2022 09:04) (91/45 - 128/48)  BP(mean): 51 (13 May 2022 05:53) (45 - 76)  RR: 20 (13 May 2022 08:56) (17 - 30)  SpO2: 96% (13 May 2022 09:04) (93% - 96%)I and O's:    Orthostatic VS    22 @ 09:04  Lying BP: 133/47 HR: 73   Sitting BP: 138/42 HR: 70  Standing BP: 121/57 HR: 82  Site: --   Mode: --      PHYSICAL EXAM:    Constitutional: NAD  HEENT: EOMI,  MMM  Neck: No LAD, No JVD  Respiratory: CTAB  Cardiovascular: S1 and S2  Gastrointestinal: BS+, soft, NT/ND  Extremities: No peripheral edema  Neurological: A/O x 3, n  : No Woodruff  Skin: No rashes  Access: Not applicable    LABS:                                   9.8    10.81 )-----------( 254      ( 13 May 2022 08:05 )             30.8                         11.8   13.84 )-----------( 315      ( 12 May 2022 23:03 )             37.2         141    |  112    |  44     ----------------------------<  130       13 May 2022 08:05  4.1     |  21     |  2.03     141    |  107    |  42     ----------------------------<  187       12 May 2022 23:03  4.3     |  24     |  2.33     Ca    8.3        13 May 2022 08:05  Ca    9.2        12 May 2022 23:03            Urine Studies:  Urinalysis Basic - ( 12 May 2022 23:03 )    Color: Yellow / Appearance: very cloudy / S.025 / pH: x  Gluc: x / Ketone: Trace  / Bili: Small / Urobili: Negative   Blood: x / Protein: 100 / Nitrite: Negative   Leuk Esterase: Moderate / RBC: 3-5 /HPF / WBC >50   Sq Epi: x / Non Sq Epi: Moderate / Bacteria: Occasional      RADIOLOGY & ADDITIONAL STUDIES:  ER: Within normal limits.  BILE DUCTS: Normal caliber.  GALLBLADDER: Within normal limits.  SPLEEN: Within normal limits.  PANCREAS: Within normal limits.  ADRENALS: Within normal limits.  KIDNEYS/URETERS: No renal stones or hydronephrosis.    BLADDER: Minimally distended.  REPRODUCTIVE ORGANS: Hysterectomy.    BOWEL: No bowel obstruction. Appendix is within normal limits. Colonic   diverticulosis.  PERITONEUM: Trace pelvic fluid.  VESSELS: Atherosclerotic calcifications.  RETROPERITONEUM/LYMPH NODES: No lymphadenopathy.  ABDOMINAL WALL: Within normal limits.  BONES: Degenerative changes. Right hip arthroplasty.    IMPRESSION:  No renal stones or hydronephrosis.  3 mm right lower lobe pulmonary nodule. 1 year follow-up chest CT may be   obtained if there are clinical risk factors.      --- End of Report ---

## 2022-05-13 NOTE — ED ADULT NURSE NOTE - CHIEF COMPLAINT QUOTE
Pt BIBEMS from home, s/p total hysterectomy at [facility] 1 week ago, c/o syncopal episode while on the toilet. Unwitnessed. Unknown head strike or LOC. On Eliquis. As per EMS, pt had episode of bloody stool. MD Benavidez aware. Neuro alert called. Brought directly to CT scan.

## 2022-05-13 NOTE — CONSULT NOTE ADULT - ASSESSMENT
76F with PMHx of Endometrial adenocarcinoma s/p recent hysterectomy at [facility] on 5/3/22 (was started on eliquis post op), MELIZA, HTN, DM2, GERD, R renal mass, L renal oncocytoma, h/o squamous cell skin cancer, presented to  ED on 5/12/22 with c/o syncopal episode. Patient was on the toilet and felt lightheaded, daughter reports her eyes rolled back and her mouth was shaking, then her eyes widened and she was staring 'blankly.' She then fell from the toilet to the side and hit her head on the wall. She also had a large bloody BM at the time. Patient was then taken to ED, has been admitted to telemetry for further monitoring.    #S/p vasovagal episode- likely pre-convulsive syncope, secondary to hypoperfusion    #Period of unresponsiveness- seizure unlikely, will r/o abnormal activity with EEG    - Routine EEG ordered  - Continue Eliquis and atorvastatin  - Correct any underlying metabolic abnormalities, treat UTI  - PT eval  - DVT prophylaxis  - Telemonitoring    Patient was seen and discussed with Dr. León

## 2022-05-13 NOTE — CONSULT NOTE ADULT - NS ATTEND AMEND GEN_ALL_CORE FT
76 year old woman s/p hysterectomy 10 days ago, while sitting in toilet passed out. Non focal exam, CT head negative for acute changes. Domenico vaso vagal episode. agree with above.

## 2022-05-13 NOTE — PHYSICAL THERAPY INITIAL EVALUATION ADULT - PERTINENT HX OF CURRENT PROBLEM, REHAB EVAL
76F with PMHx of Endometrial adenocarcinoma, MELIZA, HTN, DM2, GERD, R renal mass, L renal oncocytoma, Squamous cell skin cancer s/p hysterectomy at [facility] on 5/3/22. Today patient was on the toilet and felt lightheaded, falling to the side and hit her head on the wall and then fell to the floor. also had a large bloody BM at the time. CT head, CS (-).  CT A&P: 3 mm right lower lobe pulmonary nodule.

## 2022-05-13 NOTE — CONSULT NOTE ADULT - ASSESSMENT
77 yo female with hx of Endometrial ca s/p hysterectomy on 5/3 at [facility], w Cr 1.4 - 1.6 during admission , now presenting near syncope episode while on toilet w LUAN     LUAN on CKD ? Prerenal after poor po intake after recent surgery while on diuretics   ( pt was having intermittent diarrhea as well)      hold diuretics ( HCTZ)    SBP soft 11- 120  - hold ARB till Cr stabilized   s/p 3 Liters IV boluses in ED  - encourage po and monitor for now    would check orthostatic BP's   no iv contrast or nsaid   trend Cr values   no hydro on CT    bladder /renal sono if Cr rises    d/w RN     Thank you for the courtesy of this consult. We will follow this patient with you.   Management is subject to change if new information becomes available or patient condition changes.        75 yo female with hx of Endometrial ca s/p hysterectomy on 5/3 at [facility], w Cr 1.4 - 1.6 during admission , now presenting near syncope episode while on toilet w LUAN     LUAN on CKD ? Prerenal after poor po intake after recent surgery while on diuretics   ( pt was having intermittent diarrhea as well)      hold diuretics ( HCTZ)    SBP soft 11- 120  - hold ARB till Cr stabilized   s/p 3 Liters IV boluses in ED  - encourage po and monitor for now    orthostatic BP's noted - neg    no iv contrast or nsaid   trend Cr values   no hydro on CT    bladder /renal sono if Cr rises    d/w RN     * pt seen earlier today , note now     Thank you for the courtesy of this consult. We will follow this patient with you.   Management is subject to change if new information becomes available or patient condition changes.

## 2022-05-13 NOTE — H&P ADULT - NSHPPHYSICALEXAM_GEN_ALL_CORE
PHYSICAL EXAM:  GENERAL: NAD, well-groomed, well-developed  HEAD:  Atraumatic, Normocephalic  EYES: EOMI, PERRLA, conjunctiva and sclera clear  ENMT: Moist mucous membranes, Good dentition  NECK: Supple, No JVD  NERVOUS SYSTEM:  A/O x3, Good concentration; CN 2-12 intact, No focal deficits  CHEST/LUNG: Clear to auscultation bilaterally; No rales, rhonchi, wheezing, or rubs  HEART: Regular rate and rhythm; S1/S2, No murmurs, rubs, or gallops  ABDOMEN: Soft, Nontender, Nondistended; Bowel sounds present  VASCULAR: Normal pulses, Normal capillary refill  EXTREMITIES:  2+ Peripheral Pulses, No clubbing, cyanosis, or edema  SKIN: Warm, Intact, abdominal surgical robotic sites  PSYCH: Normal mood and affect

## 2022-05-13 NOTE — H&P ADULT - NSHPSOCIALHISTORY_GEN_ALL_CORE
lives home with son and daughter  Stopped - smoked since age 20, stopped smoking 18 years ago,  Drinks socially  Denies recreational drug use

## 2022-05-13 NOTE — ED ADULT NURSE NOTE - OBJECTIVE STATEMENT
Pt BIBEMS from home, s/p total hysterectomy at [facility] 1 week ago, c/o syncopal episode while on the toilet. Witnessed by daughter. Unknown head strike or LOC. On Eliquis. As per EMS, pt had episode of bloody stool. MD Benavidez aware. Neuro alert called. Brought directly to CT scan.

## 2022-05-13 NOTE — PATIENT PROFILE ADULT - FALL HARM RISK - HARM RISK INTERVENTIONS

## 2022-05-14 LAB
A1C WITH ESTIMATED AVERAGE GLUCOSE RESULT: 7.7 % — HIGH (ref 4–5.6)
ANION GAP SERPL CALC-SCNC: 7 MMOL/L — SIGNIFICANT CHANGE UP (ref 5–17)
BUN SERPL-MCNC: 37 MG/DL — HIGH (ref 7–23)
CALCIUM SERPL-MCNC: 9 MG/DL — SIGNIFICANT CHANGE UP (ref 8.5–10.1)
CHLORIDE SERPL-SCNC: 115 MMOL/L — HIGH (ref 96–108)
CO2 SERPL-SCNC: 21 MMOL/L — LOW (ref 22–31)
CREAT SERPL-MCNC: 1.72 MG/DL — HIGH (ref 0.5–1.3)
EGFR: 31 ML/MIN/1.73M2 — LOW
ESTIMATED AVERAGE GLUCOSE: 174 MG/DL — HIGH (ref 68–114)
FERRITIN SERPL-MCNC: 145 NG/ML — SIGNIFICANT CHANGE UP (ref 15–150)
FOLATE SERPL-MCNC: 9.3 NG/ML — SIGNIFICANT CHANGE UP
GLUCOSE SERPL-MCNC: 164 MG/DL — HIGH (ref 70–99)
HCT VFR BLD CALC: 32.3 % — LOW (ref 34.5–45)
HCV AB S/CO SERPL IA: 0.14 S/CO — SIGNIFICANT CHANGE UP (ref 0–0.99)
HCV AB SERPL-IMP: SIGNIFICANT CHANGE UP
HGB BLD-MCNC: 10.5 G/DL — LOW (ref 11.5–15.5)
IRON SATN MFR SERPL: 20 % — SIGNIFICANT CHANGE UP (ref 14–50)
IRON SATN MFR SERPL: 49 UG/DL — SIGNIFICANT CHANGE UP (ref 30–160)
MCHC RBC-ENTMCNC: 29.7 PG — SIGNIFICANT CHANGE UP (ref 27–34)
MCHC RBC-ENTMCNC: 32.5 GM/DL — SIGNIFICANT CHANGE UP (ref 32–36)
MCV RBC AUTO: 91.5 FL — SIGNIFICANT CHANGE UP (ref 80–100)
PLATELET # BLD AUTO: 240 K/UL — SIGNIFICANT CHANGE UP (ref 150–400)
POTASSIUM SERPL-MCNC: 4.4 MMOL/L — SIGNIFICANT CHANGE UP (ref 3.5–5.3)
POTASSIUM SERPL-SCNC: 4.4 MMOL/L — SIGNIFICANT CHANGE UP (ref 3.5–5.3)
RBC # BLD: 3.53 M/UL — LOW (ref 3.8–5.2)
RBC # BLD: 3.53 M/UL — LOW (ref 3.8–5.2)
RBC # FLD: 15.2 % — HIGH (ref 10.3–14.5)
RETICS #: 99.9 K/UL — SIGNIFICANT CHANGE UP (ref 25–125)
RETICS/RBC NFR: 2.8 % — HIGH (ref 0.5–2.5)
SODIUM SERPL-SCNC: 143 MMOL/L — SIGNIFICANT CHANGE UP (ref 135–145)
TIBC SERPL-MCNC: 250 UG/DL — SIGNIFICANT CHANGE UP (ref 220–430)
UIBC SERPL-MCNC: 201 UG/DL — SIGNIFICANT CHANGE UP (ref 110–370)
VIT B12 SERPL-MCNC: 453 PG/ML — SIGNIFICANT CHANGE UP (ref 232–1245)
WBC # BLD: 8.27 K/UL — SIGNIFICANT CHANGE UP (ref 3.8–10.5)
WBC # FLD AUTO: 8.27 K/UL — SIGNIFICANT CHANGE UP (ref 3.8–10.5)

## 2022-05-14 PROCEDURE — 95816 EEG AWAKE AND DROWSY: CPT | Mod: 26

## 2022-05-14 PROCEDURE — 99233 SBSQ HOSP IP/OBS HIGH 50: CPT

## 2022-05-14 PROCEDURE — 99232 SBSQ HOSP IP/OBS MODERATE 35: CPT

## 2022-05-14 RX ADMIN — Medication 1 APPLICATION(S): at 09:19

## 2022-05-14 RX ADMIN — Medication 1: at 13:03

## 2022-05-14 RX ADMIN — APIXABAN 2.5 MILLIGRAM(S): 2.5 TABLET, FILM COATED ORAL at 21:42

## 2022-05-14 RX ADMIN — Medication 1: at 09:18

## 2022-05-14 RX ADMIN — ESCITALOPRAM OXALATE 20 MILLIGRAM(S): 10 TABLET, FILM COATED ORAL at 21:42

## 2022-05-14 RX ADMIN — APIXABAN 2.5 MILLIGRAM(S): 2.5 TABLET, FILM COATED ORAL at 09:12

## 2022-05-14 RX ADMIN — Medication 1 APPLICATION(S): at 21:42

## 2022-05-14 RX ADMIN — CEFTRIAXONE 100 MILLIGRAM(S): 500 INJECTION, POWDER, FOR SOLUTION INTRAMUSCULAR; INTRAVENOUS at 21:41

## 2022-05-14 RX ADMIN — ATORVASTATIN CALCIUM 40 MILLIGRAM(S): 80 TABLET, FILM COATED ORAL at 21:41

## 2022-05-14 NOTE — CONSULT NOTE ADULT - SUBJECTIVE AND OBJECTIVE BOX
CHIEF COMPLAINT:  Patient is a 76y old  Female who presents with a chief complaint of Syncope      HPI:  76F with PMHx of Endometrial adenocarcinoma s/p recent hysterectomy at [facility] 5/3/22 - started on eliquis post op, severe MELIZA, HTN, DM2, GERD, R renal mass, L renal oncocytoma. The day of presentation she had ab cramping, quickly got to the bathroom and sat on the toilet and reports having a loose BM. After that she felt lightheaded and like the room was closing in so she called out and her daughter came and she slumped over and hit her head. She was out for only a few seconds. There were red flecks noted in her stool, concerning it could have been blood but patient reports she ate tomatoes earlier that day and that the stool itself was yellow-brown color, no blood or black. After this event she felt tired and weak and her and her family decided she should come to the ER>    : O/N patient had some sinus bradycardia and episodes ?wandering atrial pacemaker vs first degree AV block with junctional escape beats - asymptomatic.      PMHx:  PAST MEDICAL & SURGICAL HISTORY:  DM (diabetes mellitus)  HTN (hypertension)  MELIZA (obstructive sleep apnea) - severe  Endometrial cancer  Renal mass  DEpression  Coronary artery calcifications  S/P hysterectomy    Social History:  lives home with son and daughter  Stopped - smoked since age 20, stopped smoking 18 years ago,  Drinks socially  Denies recreational drug use    FAMILY HISTORY:   FAMILY HISTORY:  Family history of prostate cancer in father (Father)    ALLERGIES:  Allergies  penicillins (Unknown)    REVIEW OF SYSTEMS:  10 system ROS was obtained, all pertinent positives and negatives are in HPI otherwise they were negative.    Vital Signs Last 24 Hrs  T(C): 36.7 (14 May 2022 08:31), Max: 36.7 (14 May 2022 08:31)  T(F): 98 (14 May 2022 08:31), Max: 98 (14 May 2022 08:31)  HR: 64 (14 May 2022 08:31) (32 - 72)  BP: 120/61 (14 May 2022 08:31) (120/61 - 141/55)  BP(mean): 79 (13 May 2022 22:08) (79 - 79)  RR: 18 (14 May 2022 08:31) (18 - 18)  SpO2: 93% (14 May 2022 08:31) (93% - 95%)    CAPILLARY BLOOD GLUCOSE  POCT Blood Glucose.: 164 mg/dL (14 May 2022 09:10)  POCT Blood Glucose.: 160 mg/dL (13 May 2022 22:24)  POCT Blood Glucose.: 184 mg/dL (13 May 2022 17:49)      PHYSICAL EXAM:   Constitutional: awake and alert in NAD  HEENT: EOMI, Normal Hearing, MMM  Neck: Soft and supple, No LAD, No JVD, no carotid bruit  Respiratory: Breath sounds are clear bilaterally, No wheezing, rales or rhonchi, good air movement  Cardiovascular: S1 and S2, regular rate and rhythm, soft systolic murmur at apex. PMI is nondisplaced  Gastrointestinal: Bowel Sounds present, soft, nontender, nondistended, no guarding, no rebound  Extremities: Warm and well perfused, no peripheral edema  Neurological: A/O x 3, no focal deficits appreciated  Skin: No rashes appreciated    MEDICATIONS:  MEDICATIONS  (STANDING):  apixaban 2.5 milliGRAM(s) Oral every 12 hours  atorvastatin 40 milliGRAM(s) Oral at bedtime  BACItracin   Ointment 1 Application(s) Topical two times a day  cefTRIAXone   IVPB 1000 milliGRAM(s) IV Intermittent every 24 hours  dextrose 5%. 1000 milliLiter(s) (100 mL/Hr) IV Continuous <Continuous>  dextrose 5%. 1000 milliLiter(s) (50 mL/Hr) IV Continuous <Continuous>  dextrose 50% Injectable 25 Gram(s) IV Push once  dextrose 50% Injectable 12.5 Gram(s) IV Push once  dextrose 50% Injectable 25 Gram(s) IV Push once  escitalopram 20 milliGRAM(s) Oral daily  glucagon  Injectable 1 milliGRAM(s) IntraMuscular once  insulin lispro (ADMELOG) corrective regimen sliding scale   SubCutaneous three times a day before meals  insulin lispro (ADMELOG) corrective regimen sliding scale   SubCutaneous at bedtime      LABS: All Labs Reviewed:                        10.5   8  )-----------( 240      ( 14 May 2022 07:58 )             32.3     05-14    143  |  115<H>  |  37<H>  ----------------------------<  164<H>  4.4   |  21<L>  |  1.72<H>    Ca    9.0      14 May 2022 07:58    TPro  6.1  /  Alb  2.9<L>  /  TBili  0.3  /  DBili  x   /  AST  12<L>  /  ALT  19  /  AlkPhos  71  05-13    PT/INR - ( 12 May 2022 23:03 )   PT: 16.9 sec;   INR: 1.45 ratio    PTT - ( 12 May 2022 23:03 )  PTT:32.4 sec    BLOOD CULTURES: Organism --  Gram Stain Blood -- Gram Stain --  Specimen Source .Blood Blood-Venous  Culture-Blood --    RADIOLOGY:    Reviewed in EMR    EK/12/22, my interpretation: NSR at 70bpm, low voltage    TELEMETRY:    Reviewed, overnight patient had sinus bradycardia and was appears ?wandering atrial PPM vs Wenckebach with junctional escape beats    ECHO:    ACC: [id number] EXAM:  ECHO TTE WO CON COMP W DOPP                        PROCEDURE DATE:  2022        Indications   1) R55 - Syncope and collapse    M-Mode Measurements (cm)     LVEDd: 5.27 cm            LVESd: 3.41 cm   IVSEd: 1.25 cm   LVPWd: 1.09 cm            AO Root Dimension: 3.2 cm                             ACS: 1.2 cm                             LA: 4.3 cm                             LVOT: 2.1 cm    Doppler Measurements:     AV Velocity:252 cm/s                MV Peak E-Wave: 114 cm/s   AV Peak Gradient: 25.4 mmHg           MV Peak A-Wave: 140 cm/s   AV Mean Gradient: 12 mmHg             MV E/A Ratio: 0.81 %   AV Area (Continuity):1.87 cm^2        MV Peak Gradient: 5.2 mmHg   TR Velocity:172 cm/s   TR Gradient:11.8336 mmHg   Estimated RAP:3 mmHg   RVSP:15 mmHg     Summary     Mild concentric left ventricular hypertrophy is present.   Estimated left ventricular ejection fraction is 60-65 %.   Normal appearing left atrium.   The aortic valve appears mildly calcified. Valve opening seems to be restricted.   Peak and mean transaortic gradients are 25 and 12 mmHg respectively; this   finding is consistent with mild aortic stenosis at worst.   Moderate mitral annular calcification is present.   The mitral valve leaflets appear thickened.   Trace mitral regurgitation is present.   EA reversal of the mitral inflow consistent with reduced compliance of the left ventricle.   The tricuspid valve leaflets are thin and pliable; valve motion is normal.   Trace tricuspid valve regurgitation is present.   Pulmonic valve not well seen.

## 2022-05-14 NOTE — PROGRESS NOTE ADULT - ASSESSMENT
75 yo female with hx of Endometrial ca s/p hysterectomy on 5/3 at [facility], w Cr 1.4 - 1.6 during admission , now presenting near syncope episode while on toilet w LUAN     LUAN on CKD ? Prerenal after poor po intake after recent surgery while on diuretics   ( pt was having intermittent diarrhea as well)      hold diuretics ( HCTZ)  and ARB   BP remains stable off BP meds   Cr slowly improving - advised to increased oral fluids      no iv contrast or nsaid   trend Cr values   no hydro on CT    bladder /renal sono if Cr rises    d/w RN     * pt seen earlier today , note now

## 2022-05-14 NOTE — PROGRESS NOTE ADULT - SUBJECTIVE AND OBJECTIVE BOX
CC: Syncope (13 May 2022 17:34)    HPI:   76F with PMHx of Endometrial adenocarcinoma s/p recent hysterectomy at [facility] last week - started on eliquis post op,  MELIZA, HTN, DM2, GERD, R renal mass, L renal oncocytoma, Squamous cell skin cancer s/p hysterectomy at [facility] on 5/3/22. Today patient was on the toilet and felt lightheaded, falling to the side and hit her head on the wall and then fell to the floor, also had a large bloody BM at the time, unknown if she hit her head but she is on Eliquis.  (13 May 2022 15:07)    INTERVAL HPI/OVERNIGHT EVENTS:    Vital Signs Last 24 Hrs  T(C): 36.7 (14 May 2022 08:31), Max: 36.7 (14 May 2022 08:31)  T(F): 98 (14 May 2022 08:31), Max: 98 (14 May 2022 08:31)  HR: 64 (14 May 2022 08:31) (32 - 72)  BP: 120/61 (14 May 2022 08:31) (120/61 - 141/55)  BP(mean): 79 (13 May 2022 22:08) (79 - 79)  RR: 18 (14 May 2022 08:31) (18 - 18)  SpO2: 93% (14 May 2022 08:31) (93% - 95%)  I&O's Detail    REVIEW OF SYSTEMS:    CONSTITUTIONAL: No weakness, fevers or chills  EYES/ENT: No visual changes;  No vertigo or throat pain   NECK: No pain or stiffness  RESPIRATORY: No cough, wheezing, hemoptysis; No shortness of breath  CARDIOVASCULAR: No chest pain or palpitations  GASTROINTESTINAL: No abdominal or epigastric pain. No nausea, vomiting, or hematemesis; No diarrhea or constipation. No melena or hematochezia.  GENITOURINARY: No dysuria, frequency or hematuria  NEUROLOGICAL: No numbness or weakness  SKIN: No itching, burning, rashes, or lesions   All other review of systems is negative unless indicated above.  PHYSICAL EXAM:    General: Well developed; well nourished; in no acute distress  Eyes: PERRLA, EOMI; conjunctiva and sclera clear  Head: Normocephalic; atraumatic  ENMT: No nasal discharge; airway clear  Neck: Supple; non tender; no masses  Respiratory: No wheezes, rales or rhonchi  Cardiovascular: Regular rate and rhythm. S1 and S2 Normal; No murmurs, gallops or rubs  Gastrointestinal: Soft non-tender non-distended; Normal bowel sounds  Genitourinary: No  suprapubic  tenderness  Extremities: Normal range of motion, No clubbing, cyanosis or edema  Vascular: Peripheral pulses palpable 2+ bilaterally  Neurological: Alert and oriented x4  Skin: Warm and dry. No acute rash  Lymph Nodes: No acute cervical adenopathy  Musculoskeletal: Normal muscle tone, without deformities  Psychiatric: Cooperative and appropriate                            10.5   8.27  )-----------( 240      ( 14 May 2022 07:58 )             32.3     14 May 2022 07:58    143    |  115    |  37     ----------------------------<  164    4.4     |  21     |  1.72     Ca    9.0        14 May 2022 07:58    TPro  6.1    /  Alb  2.9    /  TBili  0.3    /  DBili  x      /  AST  12     /  ALT  19     /  AlkPhos  71     13 May 2022 08:05    PT/INR - ( 12 May 2022 23:03 )   PT: 16.9 sec;   INR: 1.45 ratio         PTT - ( 12 May 2022 23:03 )  PTT:32.4 sec  CAPILLARY BLOOD GLUCOSE      POCT Blood Glucose.: 164 mg/dL (14 May 2022 09:10)  POCT Blood Glucose.: 160 mg/dL (13 May 2022 22:24)  POCT Blood Glucose.: 184 mg/dL (13 May 2022 17:49)    LIVER FUNCTIONS - ( 13 May 2022 08:05 )  Alb: 2.9 g/dL / Pro: 6.1 gm/dL / ALK PHOS: 71 U/L / ALT: 19 U/L / AST: 12 U/L / GGT: x           Urinalysis Basic - ( 12 May 2022 23:03 )    Color: Yellow / Appearance: very cloudy / S.025 / pH: x  Gluc: x / Ketone: Trace  / Bili: Small / Urobili: Negative   Blood: x / Protein: 100 / Nitrite: Negative   Leuk Esterase: Moderate / RBC: 3-5 /HPF / WBC >50   Sq Epi: x / Non Sq Epi: Moderate / Bacteria: Occasional        MEDICATIONS  (STANDING):  apixaban 2.5 milliGRAM(s) Oral every 12 hours  atorvastatin 40 milliGRAM(s) Oral at bedtime  BACItracin   Ointment 1 Application(s) Topical two times a day  cefTRIAXone   IVPB 1000 milliGRAM(s) IV Intermittent every 24 hours  dextrose 5%. 1000 milliLiter(s) (100 mL/Hr) IV Continuous <Continuous>  dextrose 5%. 1000 milliLiter(s) (50 mL/Hr) IV Continuous <Continuous>  dextrose 50% Injectable 25 Gram(s) IV Push once  dextrose 50% Injectable 12.5 Gram(s) IV Push once  dextrose 50% Injectable 25 Gram(s) IV Push once  escitalopram 20 milliGRAM(s) Oral daily  glucagon  Injectable 1 milliGRAM(s) IntraMuscular once  insulin lispro (ADMELOG) corrective regimen sliding scale   SubCutaneous three times a day before meals  insulin lispro (ADMELOG) corrective regimen sliding scale   SubCutaneous at bedtime    MEDICATIONS  (PRN):  acetaminophen     Tablet .. 650 milliGRAM(s) Oral every 6 hours PRN Temp greater or equal to 38C (100.4F), Mild Pain (1 - 3)  aluminum hydroxide/magnesium hydroxide/simethicone Suspension 30 milliLiter(s) Oral every 4 hours PRN Dyspepsia  dextrose Oral Gel 15 Gram(s) Oral once PRN Blood Glucose LESS THAN 70 milliGRAM(s)/deciliter  melatonin 3 milliGRAM(s) Oral at bedtime PRN Insomnia  ondansetron Injectable 4 milliGRAM(s) IV Push every 8 hours PRN Nausea and/or Vomiting      RADIOLOGY & ADDITIONAL TESTS: CC: Syncope (13 May 2022 17:34)    HPI: 76F with PMHx of Endometrial adenocarcinoma s/p recent hysterectomy at [facility] last week - started on eliquis post op,  MELIZA, HTN, DM2, GERD, R renal mass, L renal oncocytoma, Squamous cell skin cancer s/p hysterectomy at [facility] on 5/3/22. Today patient was on the toilet and felt lightheaded, falling to the side and hit her head on the wall and then fell to the floor, also had a large bloody BM at the time, unknown if she hit her head but she is on Eliquis.  (13 May 2022 15:07)    INTERVAL HPI/ OVERNIGHT  EVENTS: Pt was seen and examined, reports feeling better today, denies Dizziness, no SOB, no pain. Afebrile. POC discussed     Vital Signs Last 24 Hrs  T(C): 36.7 (14 May 2022 08:31), Max: 36.7 (14 May 2022 08:31)  T(F): 98 (14 May 2022 08:31), Max: 98 (14 May 2022 08:31)  HR: 64 (14 May 2022 08:31) (32 - 72)  BP: 120/61 (14 May 2022 08:31) (120/61 - 141/55)  BP(mean): 79 (13 May 2022 22:08) (79 - 79)  RR: 18 (14 May 2022 08:31) (18 - 18)  SpO2: 93% (14 May 2022 08:31) (93% - 95%)      REVIEW OF SYSTEMS:  All other review of systems is negative unless indicated above.      PHYSICAL EXAM:  General: Well developed;  in no acute distress  Eyes:  EOMI; conjunctiva and sclera clear  Head: Normocephalic; atraumatic  ENMT: No nasal discharge; airway clear  Neck: Supple; non tender; no masses  Respiratory: No wheezes, rales or rhonchi  Cardiovascular: Regular rate and rhythm. S1 and S2 Normal;   Gastrointestinal: Soft non-tender, mildly distended, Normal bowel sounds  Genitourinary: No  suprapubic  tenderness  Extremities: No  edema  Vascular: Peripheral pulses palpable 2+ bilaterally  Neurological: Alert and oriented x3, non focal   Skin: Warm and dry. No acute rash  Musculoskeletal: Normal muscle tone, without deformities  Psychiatric: Cooperative and appropriate      LABS:                         10.5   8.27  )-----------( 240      ( 14 May 2022 07:58 )             32.3     14 May 2022 07:58    143    |  115    |  37     ----------------------------<  164    4.4     |  21     |  1.72     Ca    9.0        14 May 2022 07:58    TPro  6.1    /  Alb  2.9    /  TBili  0.3    /  DBili  x      /  AST  12     /  ALT  19     /  AlkPhos  71     13 May 2022 08:05    PT/INR - ( 12 May 2022 23:03 )   PT: 16.9 sec;   INR: 1.45 ratio      PTT - ( 12 May 2022 23:03 )  PTT:32.4 sec      CAPILLARY BLOOD GLUCOSE  POCT Blood Glucose.: 164 mg/dL (14 May 2022 09:10)  POCT Blood Glucose.: 160 mg/dL (13 May 2022 22:24)  POCT Blood Glucose.: 184 mg/dL (13 May 2022 17:49)    LIVER FUNCTIONS - ( 13 May 2022 08:05 )  Alb: 2.9 g/dL / Pro: 6.1 gm/dL / ALK PHOS: 71 U/L / ALT: 19 U/L / AST: 12 U/L / GGT: x           Urinalysis Basic - ( 12 May 2022 23:03 )    Color: Yellow / Appearance: very cloudy / S.025 / pH: x  Gluc: x / Ketone: Trace  / Bili: Small / Urobili: Negative   Blood: x / Protein: 100 / Nitrite: Negative   Leuk Esterase: Moderate / RBC: 3-5 /HPF / WBC >50   Sq Epi: x / Non Sq Epi: Moderate / Bacteria: Occasional        MEDICATIONS  (STANDING):  apixaban 2.5 milliGRAM(s) Oral every 12 hours  atorvastatin 40 milliGRAM(s) Oral at bedtime  BACItracin   Ointment 1 Application(s) Topical two times a day  cefTRIAXone   IVPB 1000 milliGRAM(s) IV Intermittent every 24 hours  dextrose 5%. 1000 milliLiter(s) (100 mL/Hr) IV Continuous <Continuous>  dextrose 5%. 1000 milliLiter(s) (50 mL/Hr) IV Continuous <Continuous>  dextrose 50% Injectable 25 Gram(s) IV Push once  dextrose 50% Injectable 12.5 Gram(s) IV Push once  dextrose 50% Injectable 25 Gram(s) IV Push once  escitalopram 20 milliGRAM(s) Oral daily  glucagon  Injectable 1 milliGRAM(s) IntraMuscular once  insulin lispro (ADMELOG) corrective regimen sliding scale   SubCutaneous three times a day before meals  insulin lispro (ADMELOG) corrective regimen sliding scale   SubCutaneous at bedtime    MEDICATIONS  (PRN):  acetaminophen     Tablet .. 650 milliGRAM(s) Oral every 6 hours PRN Temp greater or equal to 38C (100.4F), Mild Pain (1 - 3)  aluminum hydroxide/magnesium hydroxide/simethicone Suspension 30 milliLiter(s) Oral every 4 hours PRN Dyspepsia  dextrose Oral Gel 15 Gram(s) Oral once PRN Blood Glucose LESS THAN 70 milliGRAM(s)/deciliter  melatonin 3 milliGRAM(s) Oral at bedtime PRN Insomnia  ondansetron Injectable 4 milliGRAM(s) IV Push every 8 hours PRN Nausea and/or Vomiting      RADIOLOGY & ADDITIONAL TESTS:      ACC: [id number] EXAM:  CT ABDOMEN AND PELVIS                          PROCEDURE DATE:  2022    Oral Contrast: NONE      FINDINGS:  LOWER CHEST: Right lower lobe 3 mm nodule. Otherwise clear lung bases.   Aortic valve and mitral annulus calcifications.    Please note that evaluation of the abdominal organs and vascular   structures is limited by lack of intravenous contrast.    LIVER: Within normal limits.  BILE DUCTS: Normal caliber.  GALLBLADDER: Within normal limits.  SPLEEN: Within normal limits.  PANCREAS: Within normal limits.  ADRENALS: Within normal limits.  KIDNEYS/URETERS: No renal stones or hydronephrosis.    BLADDER: Minimally distended.  REPRODUCTIVE ORGANS: Hysterectomy.    BOWEL: No bowel obstruction. Appendix is within normal limits. Colonic   diverticulosis.  PERITONEUM: Trace pelvic fluid.  VESSELS: Atherosclerotic calcifications.  RETROPERITONEUM/LYMPH NODES: No lymphadenopathy.  ABDOMINAL WALL: Within normal limits.  BONES: Degenerative changes. Right hip arthroplasty.    IMPRESSION:  No renal stones or hydronephrosis.  3 mm right lower lobe pulmonary nodule. 1 year follow-up chest CT may be   obtained if there are clinical risk factors.      ACC: [id number] EXAM:  CT CERVICAL SPINE                        ACC: [id number] EXAM:  CT BRAIN                          PROCEDURE DATE:  2022          INTERPRETATION:  CLINICAL INDICATION: Neuro alert. Syncope. Fall. History   of endometrial CA,right renal mass, skin cell cancer.    TECHNIQUE: CT axial images of the head and cervical spine were obtained   without intravenous contrast. Computer-reconstructed coronal and sagittal   images were obtained.    COMPARISON: None.    FINDINGS:    Head CT: There is no obvious acute intracranial hemorrhage, large   cortical infarct, mass effect or midline shift. Nonspecific mild   periventricular and subcortical white matter lucencies likely represent   chronic microvascular ischemic changes. There is mild cerebral volume   loss with ventricular dilatation. July empty sella.    There is no depressed skull fracture. Hyperostosis frontalis interna.   There is sinus mucosal thickening. Lateral mastoid opacification.    Cervical spine CT: There isosteopenia. There is straightening of the   cervical lordosis, which may be related to patient positioning and/or   muscle spasm. There is no obvious acute fracture or traumatic   malalignment in the cervical spine. The craniocervical junction,   predental and atlantodental space are intact. Unfused C1 posterior arch.   There is minimal anterolisthesis of C7 on T1 and T2 on T3.    There are multilevel degenerative changes characterized by uncovertebral   degenerative change, disc osteophyte complex and facet arthropathy in the   cervical spine with spinal canal stenosis. No significant neural   foraminal narrowing.    There is no obvious prevertebral soft tissue edema. Evaluation of the   spinal canal content is limited on this study. There is no hematoma in   the visualized superficial soft tissue. Retropharyngeal course of the   right internal carotid artery. Heterogeneous thyroid gland, which is   obscured by artifact.    Visualized upper chest: No pneumothorax. Emphysema.    IMPRESSION:    Head CT: No obvious acute intracranial hemorrhage or displaced skull   fracture. Follow-up contrast enhanced MRI may be obtained to assess   intracranial metastasis given history.    Bilateral mastoid opacification. Recommend clinical correlation to assess   acute mastoiditis.    Cervical spine CT: Osteopenia without obvious acute fracture or traumatic   malalignment in the cervical spine. If there is clinical suspicion for   acute fracture or ligamentous/cord injury, MRI may be obtained for  further evaluation.    Heterogeneous thyroid gland, which is obscured by artifact and can be   further assessed by a nonemergent ultrasound as clinically indicated.

## 2022-05-14 NOTE — CONSULT NOTE ADULT - ASSESSMENT
76F with PMHx of Endometrial adenocarcinoma s/p recent hysterectomy at [facility] 5/3/22 - started on eliquis post op, severe MELIZA, HTN, DM2, GERD, R renal mass, L renal oncocytoma presents after syncopal event, found to have UTI and some bradycardia overnight.    -asymptomatic bradycardia with ?wandering atrial PPM with some junctional escape beats vs Wenckebach with these junctional escape beats - Reviewed patient's chart in the office and she has h/o of severe MELIZA and not compliant with her CPAP and not wearing it here. This can cause these such arrhthymias while sleeping.  -Plan to keep her in the hospital another day anyway to tx her UTI so will continue to monitor on tele.   -But her events sounds classic for vaso-vagal syncope in the setting of abd pain and a BM. It happened many years ago in the past in the same situation - with significant abd pain and BM,  and less likely arrhythmia/bradycardia,

## 2022-05-14 NOTE — PROGRESS NOTE ADULT - ASSESSMENT
# Syncope suspect vasovagal   pt. passed out while sitting on toilet   - check orthostatics  - Hold HCTZ - takes combo valsartan HCTZ at home    - Echo  - EEG  - Cardiology and neurology consult     # UTI  # Leukocytosis   had elevated lactate in ED - now wnl   UA - positive   f/u UCx and sensitivity   WBC trending down   cont. IV Rocephin     # Anemia r/o GIB   recent hysterectomy due to endometrial CA last week   H dropped from 11.8 to 9.8 pt. on eliquis   pt. reports blood BM at home when she passed out on the toilette   - stool guaiac  - anemia panel  - monitor H/H   - CBC in AM     # LUAN on CKD3  baseline creatinine  is 1.4-1.6  CT abdomen - no renal stones, no hydronephrosis   f/u US kidney and bladder   - avoid nephrotoxic drugs, monitor creatinine   - Nephrology consult - Dr. Lopez     # Pulmonary nodule - incidental finding on CT abdomen  3 mm right lower lobe pulmonary nodule   pt. with Hx of smoking - quit 18 years ago   follow up CT chest outpatient in 6-12 months     # HTN  - cont valsartan     # S/p Hysterectomy for endometrial CA last week   # robotic surgical site on abdomen with mild redness  - apply bacitracin bid  76F with PMHx of Endometrial adenocarcinoma s/p recent hysterectomy at [facility]  5/3/22, on eliquis,  EMLIZA, HTN, DM2, GERD, R renal mass, L renal oncocytoma, Squamous cell skin cancer  admitted for:       # Syncope,  suspect vasovagal   pt. passed out while sitting on toilet   - tele: few episods of juan in high 30s overnight  -orthostatics negative, but on IVF   - Hold HCTZ - takes combo valsartan HCTZ at home    - Echo: Mild AS, EF 60-65%  - EEG: pending report   - D/w CArdio c/w tele, might need event monitor outPt   - D/w Dr Licona     # Sepsis. UTI  Leukocytosis improved.   lactate elevated  in ED, improved with IVF   UA - positive   f/u UCx and sensitivity   cont. IV Rocephin     # Chronic Anemia  recent hysterectomy due to endometrial CA l  H/H slightly lower, likely 2/2 IVF  Pt did not confirm bloodly BM  Check stool guaiac  anemia panel reviewed, no iron def   monitor H/H       # LUAN on CKD3  baseline creatinine  is 1.4-1.6  CT abdomen - no renal stones, no hydronephrosis   avoid nephrotoxic drugs, Valsartan, HCTZ on hold   C/w Gentle IVF , CR trending down   D/w  Dr. Lopez     # Pulmonary nodule - incidental finding on CT abdomen  3 mm right lower lobe pulmonary nodule   pt. with Hx of smoking - quit 18 years ago   follow up CT chest outpatient in 6-12 months     # HTN  - BP borderline on presentation now improved   - cont  to hold valsartan     # S/p Hysterectomy for endometrial CA last week   # robotic surgical site on abdomen with mild redness  - apply bacitracin bid     # DVT PPX: Eliquis Low dose

## 2022-05-14 NOTE — PROGRESS NOTE ADULT - SUBJECTIVE AND OBJECTIVE BOX
Pt has no c/o dizziness, HA, LOC     EEG done this noon    ROS: As above, other ROS Negative    MEDICATIONS  (STANDING):  apixaban 2.5 milliGRAM(s) Oral every 12 hours  atorvastatin 40 milliGRAM(s) Oral at bedtime  BACItracin   Ointment 1 Application(s) Topical two times a day  cefTRIAXone   IVPB 1000 milliGRAM(s) IV Intermittent every 24 hours  dextrose 5%. 1000 milliLiter(s) (100 mL/Hr) IV Continuous <Continuous>  dextrose 5%. 1000 milliLiter(s) (50 mL/Hr) IV Continuous <Continuous>  dextrose 50% Injectable 25 Gram(s) IV Push once  dextrose 50% Injectable 12.5 Gram(s) IV Push once  dextrose 50% Injectable 25 Gram(s) IV Push once  escitalopram 20 milliGRAM(s) Oral daily  glucagon  Injectable 1 milliGRAM(s) IntraMuscular once  insulin lispro (ADMELOG) corrective regimen sliding scale   SubCutaneous three times a day before meals  insulin lispro (ADMELOG) corrective regimen sliding scale   SubCutaneous at bedtime      Vital Signs Last 24 Hrs  T(C): 36.4 (14 May 2022 16:18), Max: 36.7 (14 May 2022 08:31)  T(F): 97.5 (14 May 2022 16:18), Max: 98 (14 May 2022 08:31)  HR: 65 (14 May 2022 16:18) (32 - 72)  BP: 120/56 (14 May 2022 16:18) (120/56 - 141/55)  BP(mean): 79 (13 May 2022 22:08) (79 - 79)  RR: 18 (14 May 2022 16:18) (18 - 18)  SpO2: 95% (14 May 2022 16:18) (93% - 95%)    General: Normocephalic, NAD  HEENT: PERRLA, EOMI,   Neck: Supple.    Neurological exam:  HF: A x O x 3. Appropriately interactive, normal affect. Speech fluent, No Aphasia or paraphasic errors. Naming /repetition intact   CN: PRINCESS, EOMI, VFF, facial sensation normal, no NLFD, tongue midline  Motor: No pronator drift, Strength 5/5 in all 4 ext, normal bulk and tone, no tremor  Sens: Intact to light touch   Reflexes: Symmetric and hyporeflexive, downgoing toes b/l  Coord:  No FNFA, dysmetria, RENITA intact   Gait/Balance: Normal/Cannot test    NIHSS: 0                          10.5   8.27  )-----------( 240      ( 14 May 2022 07:58 )             32.3     05-14    143  |  115<H>  |  37<H>  ----------------------------<  164<H>  4.4   |  21<L>  |  1.72<H>    Ca    9.0      14 May 2022 07:58    TPro  6.1  /  Alb  2.9<L>  /  TBili  0.3  /  DBili  x   /  AST  12<L>  /  ALT  19  /  AlkPhos  71  05-13    Radiology report:  CT Head No Cont (05.12.22 @ 22:45) >  IMPRESSION:    Head CT: No obvious acute intracranial hemorrhage or displaced skull   fracture. Follow-up contrast enhanced MRI may be obtained to assess   intracranial metastasis given history.

## 2022-05-14 NOTE — PROGRESS NOTE ADULT - SUBJECTIVE AND OBJECTIVE BOX
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   Renal eval now for LUAN on / CKD- Cr 2.33      today    pt feeling ok    no sob or cp   ambulating and went to BR w no complaints      PAST MEDICAL & SURGICAL HISTORY:  DM (diabetes mellitus)      HTN (hypertension)      MELIZA (obstructive sleep apnea)      Endometrial cancer      Renal mass      S/P hysterectomy      Home Medications:  atorvastatin 40 mg oral tablet: 1 tab(s) orally once a day (13 May 2022 15:20)  Eliquis 2.5 mg oral tablet: 1 tab(s) orally 2 times a day (13 May 2022 15:20)  Lexapro 20 mg oral tablet: 1 tab(s) orally once a day (13 May 2022 15:20)  metFORMIN 500 mg oral tablet, extended release: 1 tab(s) orally once a day (13 May 2022 15:20)  valsartan-hydrochlorothiazide 320 mg-12.5 mg oral tablet: 1 tab(s) orally once a day (13 May 2022 15:20)      MEDICATIONS  (STANDING):  apixaban 2.5 milliGRAM(s) Oral every 12 hours  atorvastatin 40 milliGRAM(s) Oral at bedtime  BACItracin   Ointment 1 Application(s) Topical two times a day  cefTRIAXone   IVPB 1000 milliGRAM(s) IV Intermittent every 24 hours  dextrose 5%. 1000 milliLiter(s) (100 mL/Hr) IV Continuous <Continuous>  dextrose 5%. 1000 milliLiter(s) (50 mL/Hr) IV Continuous <Continuous>  dextrose 50% Injectable 25 Gram(s) IV Push once  dextrose 50% Injectable 12.5 Gram(s) IV Push once  dextrose 50% Injectable 25 Gram(s) IV Push once  escitalopram 20 milliGRAM(s) Oral daily  glucagon  Injectable 1 milliGRAM(s) IntraMuscular once  insulin lispro (ADMELOG) corrective regimen sliding scale   SubCutaneous three times a day before meals  insulin lispro (ADMELOG) corrective regimen sliding scale   SubCutaneous at bedtime        Allergies    penicillins (Unknown)    Intolerances        SOCIAL HISTORY:  Denies ETOh,Smoking,     FAMILY HISTORY:  Family history of prostate cancer in father (Father)        REVIEW OF SYSTEMS:    CONSTITUTIONAL: No weakness, fevers or chills  EYES/ENT: No visual changes;  No vertigo or throat pain   NECK: No pain or stiffness  RESPIRATORY: No cough, wheezing, hemoptysis; No shortness of breath  CARDIOVASCULAR: No chest pain or palpitations  GASTROINTESTINAL: No abdominal or epigastric pain. No nausea, vomiting, or hematemesis; No diarrhea or constipation. No melena or hematochezia.  GENITOURINARY: No dysuria, frequency or hematuria  NEUROLOGICAL: No numbness or weakness  SKIN: No itching, burning, rashes, or lesions   All other review of systems is negative unless indicated above.    Vital Signs Last 24 Hrs  T(C): 36.9 (14 May 2022 20:49), Max: 36.9 (14 May 2022 20:49)  T(F): 98.4 (14 May 2022 20:49), Max: 98.4 (14 May 2022 20:49)  HR: 71 (14 May 2022 20:49) (32 - 71)  BP: 132/54 (14 May 2022 20:49) (120/56 - 132/54)  BP(mean): --  RR: 18 (14 May 2022 16:18) (18 - 18)  SpO2: 96% (14 May 2022 20:49) (93% - 96%)  Orthostatic VS    22 @ 05:55  Lying BP: 126/52 HR: 68   Sitting BP: 133/57 HR: 71  Standing BP: 136/57 HR: 78  Site: upper left arm   Mode: electronic        PHYSICAL EXAM:    Constitutional: NAD  HEENT: EOMI,  MMM  Neck: No LAD, No JVD  Respiratory: CTAB  Cardiovascular: S1 and S2  Gastrointestinal: BS+, soft, NT/ND  Extremities: No peripheral edema  Neurological: A/O x 3, n  : No Woodruff  Skin: No rashes  Access: Not applicable    LABS:               143    |  115    |  37     ----------------------------<  164       14 May 2022 07:58  4.4     |  21     |  1.72     141    |  112    |  44     ----------------------------<  130       13 May 2022 08:05  4.1     |  21     |  2.03     141    |  107    |  42     ----------------------------<  187       12 May 2022 23:03  4.3     |  24     |  2.33     Ca    9.0        14 May 2022 07:58  Ca    8.3        13 May 2022 08:05        TPro  6.1    /  Alb  2.9    /  TBili  0.3    /        13 May 2022 08:05  DBili  x      /  AST  12     /  ALT  19     /  AlkPhos  71       TPro  7.1    /  Alb  3.6    /  TBili  0.5    /        12 May 2022 23:03  DBili  x      /  AST  23     /  ALT  23     /  AlkPhos  78                                 10.5   8.27  )-----------( 240      ( 14 May 2022 07:58 )             32.3                         9.8    10.81 )-----------( 254      ( 13 May 2022 08:05 )             30.8       Urine Studies:  Urinalysis Basic - ( 12 May 2022 23:03 )    Color: Yellow / Appearance: very cloudy / S.025 / pH: x  Gluc: x / Ketone: Trace  / Bili: Small / Urobili: Negative   Blood: x / Protein: 100 / Nitrite: Negative   Leuk Esterase: Moderate / RBC: 3-5 /HPF / WBC >50   Sq Epi: x / Non Sq Epi: Moderate / Bacteria: Occasional      RADIOLOGY & ADDITIONAL STUDIES:  ER: Within normal limits.  BILE DUCTS: Normal caliber.  GALLBLADDER: Within normal limits.  SPLEEN: Within normal limits.  PANCREAS: Within normal limits.  ADRENALS: Within normal limits.  KIDNEYS/URETERS: No renal stones or hydronephrosis.    BLADDER: Minimally distended.  REPRODUCTIVE ORGANS: Hysterectomy.    BOWEL: No bowel obstruction. Appendix is within normal limits. Colonic   diverticulosis.  PERITONEUM: Trace pelvic fluid.  VESSELS: Atherosclerotic calcifications.  RETROPERITONEUM/LYMPH NODES: No lymphadenopathy.  ABDOMINAL WALL: Within normal limits.  BONES: Degenerative changes. Right hip arthroplasty.    IMPRESSION:  No renal stones or hydronephrosis.  3 mm right lower lobe pulmonary nodule. 1 year follow-up chest CT may be   obtained if there are clinical risk factors.      --- End of Report ---

## 2022-05-14 NOTE — PROGRESS NOTE ADULT - ASSESSMENT
76F with PMHx of  MLEIZA, HTN, DM2, GERD, R renal mass, L renal oncocytoma, Endometrial CA s/p recent hysterectomy at [facility] on 5/3/22 (was started on eliquis post op), presented to  ED on 5/12/22 S/P syncopal episode. Patient was on the toilet and felt lightheaded, daughter reported her eyes rolled back, she was staring 'blankly,'  then fell from the toilet to the side and hit her head on the wall. She also had a large bloody BM.    # Most likely vasovagal syncope    # Period of unresponsiveness; less likely seizure    - EEG done, will f/u results  - Continue Eliquis and atorvastatin    Above D/W pt and Dr. Rajan

## 2022-05-14 NOTE — EEG REPORT - NS EEG TEXT BOX
[facility]   COMPREHENSIVE EPILEPSY CENTER   REPORT OF ROUTINE VIDEO EEG     [facility]: [address], [place] [address], Ph#: [phone]  [facility]: [id number], [place] [address], Ph#: [phone]  [facility]: [address] [place] [address], Ph#: [phone]    Patient Name: RIZWAN SALAZAR  Age and : 76y ([birth year])  MRN #: [id number]  Location: [place] [address]  Referring Physician: Addie Rajan    Study Date: 22    _____________________________________________________________  TECHNICAL INFORMATION    Placement and Labeling of Electrodes:  The EEG was performed utilizing 20 channels referential EEG connections (coronal over temporal over parasagittal montage) using all standard 10-20 electrode placements with EKG.  Recording was at a sampling rate of 256 samples per second per channel.  Time synchronized digital video recording was done simultaneously with EEG recording.  A low light infrared camera was used for low light recording.  Alexis and seizure detection algorithms were utilized.    _____________________________________________________________  HISTORY    Patient is a 76y old  Female who presents with a chief complaint of Syncope (14 May 2022 16:30)      PERTINENT MEDICATION:  none  _____________________________________________________________  STUDY INTERPRETATION    Findings: The background was continuous, spontaneously variable and reactive. During wakefulness, the posterior dominant rhythm consisted of symmetric, well-modulated 9 Hz activity, with amplitude to 30 uV, that attenuated to eye opening.  Low amplitude frontal beta was noted in wakefulness.    Background Slowing:  No generalized background slowing was present.    Focal Slowing:   None were present.    Sleep Background:  Drowsiness and stage II sleep transients were not recorded.    Other Non-Epileptiform Findings:  None were present.    Interictal Epileptiform Activity:   None were present.    Events:  Clinical events: None recorded.  Seizures: None recorded.    Activation Procedures:   Hyperventilation was not performed.    Photic stimulation was performed and did not elicit any abnormality.     Artifacts:  Intermittent myogenic and movement artifacts were noted.    ECG:  The heart rate on single channel ECG was predominantly between 60-70 BPM.    _____________________________________________________________  EEG SUMMARY/CLASSIFICATION    Normal EEG in the awake state  _____________________________________________________________  EEG IMPRESSION/CLINICAL CORRELATE    Normal EEG study.  No epileptiform pattern or seizure seen.    Devin Bell MD PGY-5  Epilepsy Fellow    This Preliminary report is based on fellow review. Final report pending attending review.    Reading Room: [phone]  On Call Service After Hours: [phone] [facility]   COMPREHENSIVE EPILEPSY CENTER   REPORT OF ROUTINE VIDEO EEG     [facility]: [address], [place] [address], Ph#: [phone]  [facility]: [address], [place] [address], Ph#: [phone]  [facility]: [address] [place] [address], Ph#: [phone]    Patient Name: RIZWAN SALAZAR  Age and : 76y ([birth year])  MRN #: [id number]  Location: [place] [address]  Referring Physician: Addie Rajan    Study Date: 22    _____________________________________________________________  TECHNICAL INFORMATION    Placement and Labeling of Electrodes:  The EEG was performed utilizing 20 channels referential EEG connections (coronal over temporal over parasagittal montage) using all standard 10-20 electrode placements with EKG.  Recording was at a sampling rate of 256 samples per second per channel.  Time synchronized digital video recording was done simultaneously with EEG recording.  A low light infrared camera was used for low light recording.  Alexis and seizure detection algorithms were utilized.    _____________________________________________________________  HISTORY    Patient is a 76y old  Female who presents with a chief complaint of Syncope (14 May 2022 16:30)      PERTINENT MEDICATION:  none  _____________________________________________________________  STUDY INTERPRETATION    Findings: The background was continuous, spontaneously variable and reactive. During wakefulness, the posterior dominant rhythm consisted of symmetric, well-modulated 9 Hz activity, with amplitude to 30 uV, that attenuated to eye opening.  Low amplitude frontal beta was noted in wakefulness.    Background Slowing:  No generalized background slowing was present.    Focal Slowing:   None were present.    Sleep Background:  Drowsiness and stage II sleep transients were not recorded.    Other Non-Epileptiform Findings:  None were present.    Interictal Epileptiform Activity:   None were present.    Events:  Clinical events: None recorded.  Seizures: None recorded.    Activation Procedures:   Hyperventilation was not performed.    Photic stimulation was performed and did not elicit any abnormality.     Artifacts:  Intermittent myogenic and movement artifacts were noted.    ECG:  The heart rate on single channel ECG was predominantly between 60-70 BPM.    _____________________________________________________________  EEG SUMMARY/CLASSIFICATION    Normal EEG in the awake state  _____________________________________________________________  EEG IMPRESSION/CLINICAL CORRELATE    Normal EEG study.  No epileptiform pattern or seizure seen.    Devin Bell MD PGY-5  Epilepsy Fellow        Reading Room: [phone]  On Call Service After Hours: [phone]

## 2022-05-15 LAB
-  AMIKACIN: SIGNIFICANT CHANGE UP
-  AMOXICILLIN/CLAVULANIC ACID: SIGNIFICANT CHANGE UP
-  AMPICILLIN/SULBACTAM: SIGNIFICANT CHANGE UP
-  AMPICILLIN: SIGNIFICANT CHANGE UP
-  AZTREONAM: SIGNIFICANT CHANGE UP
-  CEFAZOLIN: SIGNIFICANT CHANGE UP
-  CEFEPIME: SIGNIFICANT CHANGE UP
-  CEFOXITIN: SIGNIFICANT CHANGE UP
-  CEFTRIAXONE: SIGNIFICANT CHANGE UP
-  CIPROFLOXACIN: SIGNIFICANT CHANGE UP
-  ERTAPENEM: SIGNIFICANT CHANGE UP
-  GENTAMICIN: SIGNIFICANT CHANGE UP
-  IMIPENEM: SIGNIFICANT CHANGE UP
-  LEVOFLOXACIN: SIGNIFICANT CHANGE UP
-  MEROPENEM: SIGNIFICANT CHANGE UP
-  NITROFURANTOIN: SIGNIFICANT CHANGE UP
-  PIPERACILLIN/TAZOBACTAM: SIGNIFICANT CHANGE UP
-  TIGECYCLINE: SIGNIFICANT CHANGE UP
-  TOBRAMYCIN: SIGNIFICANT CHANGE UP
-  TRIMETHOPRIM/SULFAMETHOXAZOLE: SIGNIFICANT CHANGE UP
ANION GAP SERPL CALC-SCNC: 8 MMOL/L — SIGNIFICANT CHANGE UP (ref 5–17)
BUN SERPL-MCNC: 32 MG/DL — HIGH (ref 7–23)
CALCIUM SERPL-MCNC: 9.1 MG/DL — SIGNIFICANT CHANGE UP (ref 8.5–10.1)
CHLORIDE SERPL-SCNC: 113 MMOL/L — HIGH (ref 96–108)
CO2 SERPL-SCNC: 17 MMOL/L — LOW (ref 22–31)
CREAT SERPL-MCNC: 1.57 MG/DL — HIGH (ref 0.5–1.3)
CULTURE RESULTS: SIGNIFICANT CHANGE UP
EGFR: 34 ML/MIN/1.73M2 — LOW
GLUCOSE SERPL-MCNC: 156 MG/DL — HIGH (ref 70–99)
HCT VFR BLD CALC: 35.7 % — SIGNIFICANT CHANGE UP (ref 34.5–45)
HGB BLD-MCNC: 11 G/DL — LOW (ref 11.5–15.5)
MCHC RBC-ENTMCNC: 29.6 PG — SIGNIFICANT CHANGE UP (ref 27–34)
MCHC RBC-ENTMCNC: 30.8 GM/DL — LOW (ref 32–36)
MCV RBC AUTO: 96 FL — SIGNIFICANT CHANGE UP (ref 80–100)
METHOD TYPE: SIGNIFICANT CHANGE UP
ORGANISM # SPEC MICROSCOPIC CNT: SIGNIFICANT CHANGE UP
ORGANISM # SPEC MICROSCOPIC CNT: SIGNIFICANT CHANGE UP
PLATELET # BLD AUTO: 224 K/UL — SIGNIFICANT CHANGE UP (ref 150–400)
POTASSIUM SERPL-MCNC: 4.6 MMOL/L — SIGNIFICANT CHANGE UP (ref 3.5–5.3)
POTASSIUM SERPL-SCNC: 4.6 MMOL/L — SIGNIFICANT CHANGE UP (ref 3.5–5.3)
RBC # BLD: 3.72 M/UL — LOW (ref 3.8–5.2)
RBC # FLD: 15.2 % — HIGH (ref 10.3–14.5)
SODIUM SERPL-SCNC: 138 MMOL/L — SIGNIFICANT CHANGE UP (ref 135–145)
SPECIMEN SOURCE: SIGNIFICANT CHANGE UP
WBC # BLD: 9.05 K/UL — SIGNIFICANT CHANGE UP (ref 3.8–10.5)
WBC # FLD AUTO: 9.05 K/UL — SIGNIFICANT CHANGE UP (ref 3.8–10.5)

## 2022-05-15 PROCEDURE — 99232 SBSQ HOSP IP/OBS MODERATE 35: CPT

## 2022-05-15 RX ORDER — FLUCONAZOLE 150 MG/1
150 TABLET ORAL ONCE
Refills: 0 | Status: COMPLETED | OUTPATIENT
Start: 2022-05-15 | End: 2022-05-15

## 2022-05-15 RX ADMIN — CEFTRIAXONE 100 MILLIGRAM(S): 500 INJECTION, POWDER, FOR SOLUTION INTRAMUSCULAR; INTRAVENOUS at 22:28

## 2022-05-15 RX ADMIN — ATORVASTATIN CALCIUM 40 MILLIGRAM(S): 80 TABLET, FILM COATED ORAL at 22:29

## 2022-05-15 RX ADMIN — FLUCONAZOLE 150 MILLIGRAM(S): 150 TABLET ORAL at 19:56

## 2022-05-15 RX ADMIN — APIXABAN 2.5 MILLIGRAM(S): 2.5 TABLET, FILM COATED ORAL at 11:38

## 2022-05-15 RX ADMIN — APIXABAN 2.5 MILLIGRAM(S): 2.5 TABLET, FILM COATED ORAL at 22:29

## 2022-05-15 RX ADMIN — Medication 2: at 11:42

## 2022-05-15 RX ADMIN — Medication 1 APPLICATION(S): at 22:28

## 2022-05-15 RX ADMIN — Medication 1 APPLICATION(S): at 11:54

## 2022-05-15 RX ADMIN — ESCITALOPRAM OXALATE 20 MILLIGRAM(S): 10 TABLET, FILM COATED ORAL at 22:29

## 2022-05-15 NOTE — PROGRESS NOTE ADULT - ASSESSMENT
76F with PMHx of Endometrial adenocarcinoma s/p recent hysterectomy at [facility] 5/3/22 - started on eliquis post op, severe MELIZA, HTN, DM2, GERD, R renal mass, L renal oncocytoma presents after syncopal event, found to have UTI and some bradycardia overnight.    -asymptomatic bradycardia overnight - has h/o severe MELIZA not wearing CPAP. She was noncompliant with it at home and no longer has a machine. She is followed by Dr. Guan for this. Highly recommended to her to follow-up with him and try to get a mask or tx for her MELIZA.  -Her event is classic vaso-vagal syncope and believe the tele events are just due to her MELIZA and she is now much improved. Will consider an outpatient monitor to be sure - can set this up as outpatient in the office.  -Dispo as per primary team, OK to D/C from cardiac standpoint with close outpatient f/u with Dr. Mckeon.

## 2022-05-15 NOTE — PROGRESS NOTE ADULT - SUBJECTIVE AND OBJECTIVE BOX
CHIEF COMPLAINT:  Patient is a 76y old  Female who presents with a chief complaint of Syncope      HPI:  76F with PMHx of Endometrial adenocarcinoma s/p recent hysterectomy at [facility] 5/3/22 - started on eliquis post op, severe MELZIA, HTN, DM2, GERD, R renal mass, L renal oncocytoma. The day of presentation she had ab cramping, quickly got to the bathroom and sat on the toilet and reports having a loose BM. After that she felt lightheaded and like the room was closing in so she called out and her daughter came and she slumped over and hit her head. She was out for only a few seconds. There were red flecks noted in her stool, concerning it could have been blood but patient reports she ate tomatoes earlier that day and that the stool itself was yellow-brown color, no blood or black. After this event she felt tired and weak and her and her family decided she should come to the ER>    : O/N patient had some sinus bradycardia and episodes ?wandering atrial pacemaker vs first degree AV block with junctional escape beats - asymptomatic.  5/15: Patient has some sinus bradycardia and some second degree type 1 AV block while sleeping - asymptomatic. She otherwise feels fine. She has had a BM without any symptoms.      PMHx:  PAST MEDICAL & SURGICAL HISTORY:  DM (diabetes mellitus)  HTN (hypertension)  MELIZA (obstructive sleep apnea) - severe  Endometrial cancer  Renal mass  DEpression  Coronary artery calcifications  S/P hysterectomy    Social History:  lives home with son and daughter  Stopped - smoked since age 20, stopped smoking 18 years ago,  Drinks socially  Denies recreational drug use    FAMILY HISTORY:   FAMILY HISTORY:  Family history of prostate cancer in father (Father)    ALLERGIES:  Allergies  penicillins (Unknown)    REVIEW OF SYSTEMS:  10 system ROS was obtained, all pertinent positives and negatives are in HPI otherwise they were negative.    Vital Signs Last 24 Hrs  T(C): 36.7 (15 May 2022 08:31), Max: 36.9 (14 May 2022 20:49)  T(F): 98.1 (15 May 2022 08:31), Max: 98.4 (14 May 2022 20:49)  HR: 65 (15 May 2022 08:31) (65 - 71)  BP: 153/58 (15 May 2022 08:31) (120/56 - 153/58)  BP(mean): --  RR: 18 (15 May 2022 08:31) (18 - 18)  SpO2: 94% (15 May 2022 08:31) (94% - 96%)    PHYSICAL EXAM:   Constitutional: awake and alert in NAD  HEENT: EOMI, Normal Hearing, MMM  Neck: Soft and supple, No LAD, No JVD, no carotid bruit  Respiratory: Breath sounds are clear bilaterally, No wheezing, rales or rhonchi, good air movement  Cardiovascular: S1 and S2, regular rate and rhythm, soft systolic murmur at apex. PMI is nondisplaced  Gastrointestinal: Bowel Sounds present, soft, nontender, nondistended, no guarding, no rebound  Extremities: Warm and well perfused, no peripheral edema  Neurological: A/O x 3, no focal deficits appreciated  Skin: No rashes appreciated    MEDICATIONS  (STANDING):  apixaban 2.5 milliGRAM(s) Oral every 12 hours  atorvastatin 40 milliGRAM(s) Oral at bedtime  BACItracin   Ointment 1 Application(s) Topical two times a day  cefTRIAXone   IVPB 1000 milliGRAM(s) IV Intermittent every 24 hours  dextrose 5%. 1000 milliLiter(s) (100 mL/Hr) IV Continuous <Continuous>  dextrose 5%. 1000 milliLiter(s) (50 mL/Hr) IV Continuous <Continuous>  dextrose 50% Injectable 25 Gram(s) IV Push once  dextrose 50% Injectable 12.5 Gram(s) IV Push once  dextrose 50% Injectable 25 Gram(s) IV Push once  escitalopram 20 milliGRAM(s) Oral daily  glucagon  Injectable 1 milliGRAM(s) IntraMuscular once  insulin lispro (ADMELOG) corrective regimen sliding scale   SubCutaneous three times a day before meals  insulin lispro (ADMELOG) corrective regimen sliding scale   SubCutaneous at bedtime    MEDICATIONS  (PRN):  acetaminophen     Tablet .. 650 milliGRAM(s) Oral every 6 hours PRN Temp greater or equal to 38C (100.4F), Mild Pain (1 - 3)  aluminum hydroxide/magnesium hydroxide/simethicone Suspension 30 milliLiter(s) Oral every 4 hours PRN Dyspepsia  dextrose Oral Gel 15 Gram(s) Oral once PRN Blood Glucose LESS THAN 70 milliGRAM(s)/deciliter  melatonin 3 milliGRAM(s) Oral at bedtime PRN Insomnia  ondansetron Injectable 4 milliGRAM(s) IV Push every 8 hours PRN Nausea and/or Vomiting    LABS: All Labs Reviewed:                        10.5   8.27  )-----------( 240      ( 14 May 2022 07:58 )             32.3     05-15    138  |  113<H>  |  32<H>  ----------------------------<  156<H>  4.6   |  17<L>  |  1.57<H>    Ca    9.1      15 May 2022 08:43      05-14    143  |  115<H>  |  37<H>  ----------------------------<  164<H>  4.4   |  21<L>  |  1.72<H>    Ca    9.0      14 May 2022 07:58    TPro  6.1  /  Alb  2.9<L>  /  TBili  0.3  /  DBili  x   /  AST  12<L>  /  ALT  19  /  AlkPhos  71  05-13    PT/INR - ( 12 May 2022 23:03 )   PT: 16.9 sec;   INR: 1.45 ratio    PTT - ( 12 May 2022 23:03 )  PTT:32.4 sec    BLOOD CULTURES: Organism --  Gram Stain Blood -- Gram Stain --  Specimen Source .Blood Blood-Venous  Culture-Blood --    RADIOLOGY:    Reviewed in EMR    EK/12/22, my interpretation: NSR at 70bpm, low voltage    TELEMETRY:    Reviewed, overnight patient had sinus bradycardia and was appears ?wandering atrial PPM vs Wenckebach with junctional escape beats    ECHO:    ACC: [id number] EXAM:  ECHO TTE WO CON COMP W DOPP                        PROCEDURE DATE:  2022        Indications   1) R55 - Syncope and collapse    M-Mode Measurements (cm)     LVEDd: 5.27 cm            LVESd: 3.41 cm   IVSEd: 1.25 cm   LVPWd: 1.09 cm            AO Root Dimension: 3.2 cm                             ACS: 1.2 cm                             LA: 4.3 cm                             LVOT: 2.1 cm    Doppler Measurements:     AV Velocity:252 cm/s                MV Peak E-Wave: 114 cm/s   AV Peak Gradient: 25.4 mmHg           MV Peak A-Wave: 140 cm/s   AV Mean Gradient: 12 mmHg             MV E/A Ratio: 0.81 %   AV Area (Continuity):1.87 cm^2        MV Peak Gradient: 5.2 mmHg   TR Velocity:172 cm/s   TR Gradient:11.8336 mmHg   Estimated RAP:3 mmHg   RVSP:15 mmHg     Summary     Mild concentric left ventricular hypertrophy is present.   Estimated left ventricular ejection fraction is 60-65 %.   Normal appearing left atrium.   The aortic valve appears mildly calcified. Valve opening seems to be restricted.   Peak and mean transaortic gradients are 25 and 12 mmHg respectively; this   finding is consistent with mild aortic stenosis at worst.   Moderate mitral annular calcification is present.   The mitral valve leaflets appear thickened.   Trace mitral regurgitation is present.   EA reversal of the mitral inflow consistent with reduced compliance of the left ventricle.   The tricuspid valve leaflets are thin and pliable; valve motion is normal.   Trace tricuspid valve regurgitation is present.   Pulmonic valve not well seen.

## 2022-05-15 NOTE — PROGRESS NOTE ADULT - SUBJECTIVE AND OBJECTIVE BOX
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   Renal eval now for LUAN on / CKD- Cr 2.33      today    pt feeling ok    no sob or cp   ambulating and went to [facility] w no complaints      Home Medications:  atorvastatin 40 mg oral tablet: 1 tab(s) orally once a day (13 May 2022 15:20)  Eliquis 2.5 mg oral tablet: 1 tab(s) orally 2 times a day (13 May 2022 15:20)  Lexapro 20 mg oral tablet: 1 tab(s) orally once a day (13 May 2022 15:20)  metFORMIN 500 mg oral tablet, extended release: 1 tab(s) orally once a day (13 May 2022 15:20)  valsartan-hydrochlorothiazide 320 mg-12.5 mg oral tablet: 1 tab(s) orally once a day (13 May 2022 15:20)    MEDICATIONS  (STANDING):  apixaban 2.5 milliGRAM(s) Oral every 12 hours  atorvastatin 40 milliGRAM(s) Oral at bedtime  BACItracin   Ointment 1 Application(s) Topical two times a day  cefTRIAXone   IVPB 1000 milliGRAM(s) IV Intermittent every 24 hours  dextrose 5%. 1000 milliLiter(s) (100 mL/Hr) IV Continuous <Continuous>  dextrose 5%. 1000 milliLiter(s) (50 mL/Hr) IV Continuous <Continuous>  dextrose 50% Injectable 25 Gram(s) IV Push once  dextrose 50% Injectable 12.5 Gram(s) IV Push once  dextrose 50% Injectable 25 Gram(s) IV Push once  escitalopram 20 milliGRAM(s) Oral daily  glucagon  Injectable 1 milliGRAM(s) IntraMuscular once  insulin lispro (ADMELOG) corrective regimen sliding scale   SubCutaneous three times a day before meals  insulin lispro (ADMELOG) corrective regimen sliding scale   SubCutaneous at bedtime      Allergies    penicillins (Unknown)    Intolerances        SOCIAL HISTORY:  Denies ETOh,Smoking,     FAMILY HISTORY:  Family history of prostate cancer in father (Father)        REVIEW OF SYSTEMS:    CONSTITUTIONAL: No weakness, fevers or chills  EYES/ENT: No visual changes;  No vertigo or throat pain   NECK: No pain or stiffness  RESPIRATORY: No cough, wheezing, hemoptysis; No shortness of breath  CARDIOVASCULAR: No chest pain or palpitations  GASTROINTESTINAL: No abdominal or epigastric pain. No nausea, vomiting, or hematemesis; No diarrhea or constipation. No melena or hematochezia.  GENITOURINARY: No dysuria, frequency or hematuria  NEUROLOGICAL: No numbness or weakness  SKIN: No itching, burning, rashes, or lesions   All other review of systems is negative unless indicated above.    Vital Signs Last 24 Hrs  T(C): 36.6 (15 May 2022 21:44), Max: 36.7 (15 May 2022 08:31)  T(F): 97.8 (15 May 2022 21:44), Max: 98.1 (15 May 2022 08:31)  HR: 65 (15 May 2022 21:56) (65 - 73)  BP: 150/62 (15 May 2022 21:56) (123/79 - 165/67)  BP(mean): --  RR: 19 (15 May 2022 21:44) (18 - 19)  SpO2: 98% (15 May 2022 21:44) (94% - 98%)    I&O's Detail    15 May 2022 07:01  -  15 May 2022 23:50  --------------------------------------------------------  IN:    IV PiggyBack: 50 mL  Total IN: 50 mL    OUT:  Total OUT: 0 mL    Total NET: 50 mL      PHYSICAL EXAM:    Constitutional: NAD  HEENT: EOMI,  MM  Neck: No LAD, No JVD  Respiratory: CTAB  Cardiovascular: S1 and S2  Gastrointestinal: BS+, soft, NT/ND  Extremities: No peripheral edema  Neurological: A/O x 3, n  : No Woodruff  Skin: No rashes  Access: Not applicable    LABS:                                   11.0   9.05  )-----------( 224      ( 15 May 2022 08:43 )             35.7                         10.5   8.27  )-----------( 240      ( 14 May 2022 07:58 )             32.3         138    |  113    |  32     ----------------------------<  156       15 May 2022 08:43  4.6     |  17     |  1.57     143    |  115    |  37     ----------------------------<  164       14 May 2022 07:58  4.4     |  21     |  1.72     141    |  112    |  44     ----------------------------<  130       13 May 2022 08:05  4.1     |  21     |  2.03     Ca    9.1        15 May 2022 08:43  Ca    9.0        14 May 2022 07:58      TPro  6.1    /  Alb  2.9    /  TBili  0.3    /        13 May 2022 08:05  DBili  x      /  AST  12     /  ALT  19     /  AlkPhos  71       TPro  7.1    /  Alb  3.6    /  TBili  0.5    /        12 May 2022 23:03  DBili  x      /  AST  23     /  ALT  23     /  AlkPhos  78             Urine Studies:  Urinalysis Basic - ( 12 May 2022 23:03 )    Color: Yellow / Appearance: very cloudy / S.025 / pH: x  Gluc: x / Ketone: Trace  / Bili: Small / Urobili: Negative   Blood: x / Protein: 100 / Nitrite: Negative   Leuk Esterase: Moderate / RBC: 3-5 /HPF / WBC >50   Sq Epi: x / Non Sq Epi: Moderate / Bacteria: Occasional      RADIOLOGY & ADDITIONAL STUDIES:  ER: Within normal limits.  BILE DUCTS: Normal caliber.  GALLBLADDER: Within normal limits.  SPLEEN: Within normal limits.  PANCREAS: Within normal limits.  ADRENALS: Within normal limits.  KIDNEYS/URETERS: No renal stones or hydronephrosis.    BLADDER: Minimally distended.  REPRODUCTIVE ORGANS: Hysterectomy.    BOWEL: No bowel obstruction. Appendix is within normal limits. Colonic   diverticulosis.  PERITONEUM: Trace pelvic fluid.  VESSELS: Atherosclerotic calcifications.  RETROPERITONEUM/LYMPH NODES: No lymphadenopathy.  ABDOMINAL WALL: Within normal limits.  BONES: Degenerative changes. Right hip arthroplasty.    IMPRESSION:  No renal stones or hydronephrosis.  3 mm right lower lobe pulmonary nodule. 1 year follow-up chest CT may be   obtained if there are clinical risk factors.      --- End of Report ---

## 2022-05-15 NOTE — PROGRESS NOTE ADULT - SUBJECTIVE AND OBJECTIVE BOX
CC: Syncope (13 May 2022 17:34)    HPI: 76F with PMHx of Endometrial adenocarcinoma s/p recent hysterectomy at [facility] last week - started on eliquis post op,  MELIZA, HTN, DM2, GERD, R renal mass, L renal oncocytoma, Squamous cell skin cancer s/p hysterectomy at [facility] on 5/3/22. Today patient was on the toilet and felt lightheaded, falling to the side and hit her head on the wall and then fell to the floor, also had a large bloody BM at the time, unknown if she hit her head but she is on Eliquis.  (13 May 2022 15:07)    INTERVAL HPI/ OVERNIGHT  EVENTS: Pt was seen and examined,       Vital Signs Last 24 Hrs  T(C): 36.7 (15 May 2022 08:31), Max: 36.9 (14 May 2022 20:49)  T(F): 98.1 (15 May 2022 08:31), Max: 98.4 (14 May 2022 20:49)  HR: 65 (15 May 2022 08:31) (65 - 71)  BP: 153/58 (15 May 2022 08:31) (120/56 - 153/58)  RR: 18 (15 May 2022 08:31) (18 - 18)  SpO2: 94% (15 May 2022 08:31) (94% - 96%)    REVIEW OF SYSTEMS:  All other review of systems is negative unless indicated above.      PHYSICAL EXAM:  General: Well developed;  in no acute distress  Eyes:  EOMI; conjunctiva and sclera clear  Head: Normocephalic; atraumatic  ENMT: No nasal discharge; airway clear  Neck: Supple; non tender; no masses  Respiratory: No wheezes, rales or rhonchi  Cardiovascular: Regular rate and rhythm. S1 and S2 Normal;   Gastrointestinal: Soft non-tender, mildly distended, Normal bowel sounds  Genitourinary: No  suprapubic  tenderness  Extremities: No  edema  Vascular: Peripheral pulses palpable 2+ bilaterally  Neurological: Alert and oriented x3, non focal   Skin: Warm and dry. No acute rash  Musculoskeletal: Normal muscle tone, without deformities  Psychiatric: Cooperative and appropriate      LABS:                         10.5   8.27  )-----------( 240      ( 14 May 2022 07:58 )             32.3     14 May 2022 07:58    143    |  115    |  37     ----------------------------<  164    4.4     |  21     |  1.72     Ca    9.0        14 May 2022 07:58    TPro  6.1    /  Alb  2.9    /  TBili  0.3    /  DBili  x      /  AST  12     /  ALT  19     /  AlkPhos  71     13 May 2022 08:05    PT/INR - ( 12 May 2022 23:03 )   PT: 16.9 sec;   INR: 1.45 ratio      PTT - ( 12 May 2022 23:03 )  PTT:32.4 sec      CAPILLARY BLOOD GLUCOSE  POCT Blood Glucose.: 164 mg/dL (14 May 2022 09:10)  POCT Blood Glucose.: 160 mg/dL (13 May 2022 22:24)  POCT Blood Glucose.: 184 mg/dL (13 May 2022 17:49)    LIVER FUNCTIONS - ( 13 May 2022 08:05 )  Alb: 2.9 g/dL / Pro: 6.1 gm/dL / ALK PHOS: 71 U/L / ALT: 19 U/L / AST: 12 U/L / GGT: x           Urinalysis Basic - ( 12 May 2022 23:03 )    Color: Yellow / Appearance: very cloudy / S.025 / pH: x  Gluc: x / Ketone: Trace  / Bili: Small / Urobili: Negative   Blood: x / Protein: 100 / Nitrite: Negative   Leuk Esterase: Moderate / RBC: 3-5 /HPF / WBC >50   Sq Epi: x / Non Sq Epi: Moderate / Bacteria: Occasional        MEDICATIONS  (STANDING):  apixaban 2.5 milliGRAM(s) Oral every 12 hours  atorvastatin 40 milliGRAM(s) Oral at bedtime  BACItracin   Ointment 1 Application(s) Topical two times a day  cefTRIAXone   IVPB 1000 milliGRAM(s) IV Intermittent every 24 hours  dextrose 5%. 1000 milliLiter(s) (100 mL/Hr) IV Continuous <Continuous>  dextrose 5%. 1000 milliLiter(s) (50 mL/Hr) IV Continuous <Continuous>  dextrose 50% Injectable 25 Gram(s) IV Push once  dextrose 50% Injectable 12.5 Gram(s) IV Push once  dextrose 50% Injectable 25 Gram(s) IV Push once  escitalopram 20 milliGRAM(s) Oral daily  glucagon  Injectable 1 milliGRAM(s) IntraMuscular once  insulin lispro (ADMELOG) corrective regimen sliding scale   SubCutaneous three times a day before meals  insulin lispro (ADMELOG) corrective regimen sliding scale   SubCutaneous at bedtime    MEDICATIONS  (PRN):  acetaminophen     Tablet .. 650 milliGRAM(s) Oral every 6 hours PRN Temp greater or equal to 38C (100.4F), Mild Pain (1 - 3)  aluminum hydroxide/magnesium hydroxide/simethicone Suspension 30 milliLiter(s) Oral every 4 hours PRN Dyspepsia  dextrose Oral Gel 15 Gram(s) Oral once PRN Blood Glucose LESS THAN 70 milliGRAM(s)/deciliter  melatonin 3 milliGRAM(s) Oral at bedtime PRN Insomnia  ondansetron Injectable 4 milliGRAM(s) IV Push every 8 hours PRN Nausea and/or Vomiting      RADIOLOGY & ADDITIONAL TESTS:      ACC: [id number] EXAM:  CT ABDOMEN AND PELVIS                          PROCEDURE DATE:  2022    Oral Contrast: NONE      FINDINGS:  LOWER CHEST: Right lower lobe 3 mm nodule. Otherwise clear lung bases.   Aortic valve and mitral annulus calcifications.    Please note that evaluation of the abdominal organs and vascular   structures is limited by lack of intravenous contrast.    LIVER: Within normal limits.  BILE DUCTS: Normal caliber.  GALLBLADDER: Within normal limits.  SPLEEN: Within normal limits.  PANCREAS: Within normal limits.  ADRENALS: Within normal limits.  KIDNEYS/URETERS: No renal stones or hydronephrosis.    BLADDER: Minimally distended.  REPRODUCTIVE ORGANS: Hysterectomy.    BOWEL: No bowel obstruction. Appendix is within normal limits. Colonic   diverticulosis.  PERITONEUM: Trace pelvic fluid.  VESSELS: Atherosclerotic calcifications.  RETROPERITONEUM/LYMPH NODES: No lymphadenopathy.  ABDOMINAL WALL: Within normal limits.  BONES: Degenerative changes. Right hip arthroplasty.    IMPRESSION:  No renal stones or hydronephrosis.  3 mm right lower lobe pulmonary nodule. 1 year follow-up chest CT may be   obtained if there are clinical risk factors.      ACC: [id number] EXAM:  CT CERVICAL SPINE                        ACC: [id number] EXAM:  CT BRAIN                          PROCEDURE DATE:  2022          INTERPRETATION:  CLINICAL INDICATION: Neuro alert. Syncope. Fall. History   of endometrial CA,right renal mass, skin cell cancer.    TECHNIQUE: CT axial images of the head and cervical spine were obtained   without intravenous contrast. Computer-reconstructed coronal and sagittal   images were obtained.    COMPARISON: None.    FINDINGS:    Head CT: There is no obvious acute intracranial hemorrhage, large   cortical infarct, mass effect or midline shift. Nonspecific mild   periventricular and subcortical white matter lucencies likely represent   chronic microvascular ischemic changes. There is mild cerebral volume   loss with ventricular dilatation. July empty sella.    There is no depressed skull fracture. Hyperostosis frontalis interna.   There is sinus mucosal thickening. Lateral mastoid opacification.    Cervical spine CT: There isosteopenia. There is straightening of the   cervical lordosis, which may be related to patient positioning and/or   muscle spasm. There is no obvious acute fracture or traumatic   malalignment in the cervical spine. The craniocervical junction,   predental and atlantodental space are intact. Unfused C1 posterior arch.   There is minimal anterolisthesis of C7 on T1 and T2 on T3.    There are multilevel degenerative changes characterized by uncovertebral   degenerative change, disc osteophyte complex and facet arthropathy in the   cervical spine with spinal canal stenosis. No significant neural   foraminal narrowing.    There is no obvious prevertebral soft tissue edema. Evaluation of the   spinal canal content is limited on this study. There is no hematoma in   the visualized superficial soft tissue. Retropharyngeal course of the   right internal carotid artery. Heterogeneous thyroid gland, which is   obscured by artifact.    Visualized upper chest: No pneumothorax. Emphysema.    IMPRESSION:    Head CT: No obvious acute intracranial hemorrhage or displaced skull   fracture. Follow-up contrast enhanced MRI may be obtained to assess   intracranial metastasis given history.    Bilateral mastoid opacification. Recommend clinical correlation to assess   acute mastoiditis.    Cervical spine CT: Osteopenia without obvious acute fracture or traumatic   malalignment in the cervical spine. If there is clinical suspicion for   acute fracture or ligamentous/cord injury, MRI may be obtained for  further evaluation.    Heterogeneous thyroid gland, which is obscured by artifact and can be   further assessed by a nonemergent ultrasound as clinically indicated.       CC: Syncope (13 May 2022 17:34)    HPI: 76F with PMHx of Endometrial adenocarcinoma s/p recent hysterectomy at [facility] last week - started on eliquis post op,  MELIZA, HTN, DM2, GERD, R renal mass, L renal oncocytoma, Squamous cell skin cancer s/p hysterectomy at [facility] on 5/3/22. Today patient was on the toilet and felt lightheaded, falling to the side and hit her head on the wall and then fell to the floor, also had a large bloody BM at the time, unknown if she hit her head but she is on Eliquis.      INTERVAL HPI/ OVERNIGHT  EVENTS: Pt was seen and examined, OOB to chair, comfortable, reports has vaginal itchiness, otherwise feeling well. POC discussed       Vital Signs Last 24 Hrs  T(C): 36.7 (15 May 2022 08:31), Max: 36.9 (14 May 2022 20:49)  T(F): 98.1 (15 May 2022 08:31), Max: 98.4 (14 May 2022 20:49)  HR: 65 (15 May 2022 08:31) (65 - 71)  BP: 153/58 (15 May 2022 08:31) (120/56 - 153/58)  RR: 18 (15 May 2022 08:31) (18 - 18)  SpO2: 94% (15 May 2022 08:31) (94% - 96%)    REVIEW OF SYSTEMS:  All other review of systems is negative unless indicated above.      PHYSICAL EXAM:  General: Well developed;  in no acute distress  Eyes:  EOMI; conjunctiva and sclera clear  Head: Normocephalic; atraumatic  ENMT: No nasal discharge; airway clear  Neck: Supple; non tender; no masses  Respiratory: No wheezes, rales or rhonchi  Cardiovascular: Regular rate and rhythm. S1 and S2 Normal;   Gastrointestinal: Soft non-tender, mildly distended, Normal bowel sounds  Genitourinary: No  suprapubic  tenderness. + Vulvar whitish discharge   Extremities: No  edema  Vascular: Peripheral pulses palpable 2+ bilaterally  Neurological: Alert and oriented x3, non focal   Skin: Warm and dry. No acute rash  Musculoskeletal: Normal muscle tone, without deformities  Psychiatric: Cooperative and appropriate      LABS:                         11.0   9.05  )-----------( 224      ( 15 May 2022 08:43 )             35.7     05-15    138  |  113<H>  |  32<H>  ----------------------------<  156<H>  4.6   |  17<L>  |  1.57<H>    Ca    9.1      15 May 2022 08:43                            10.5   8.27  )-----------( 240      ( 14 May 2022 07:58 )             32.3     14 May 2022 07:58    143    |  115    |  37     ----------------------------<  164    4.4     |  21     |  1.72     Ca    9.0        14 May 2022 07:58    TPro  6.1    /  Alb  2.9    /  TBili  0.3    /  DBili  x      /  AST  12     /  ALT  19     /  AlkPhos  71     13 May 2022 08:05    PT/INR - ( 12 May 2022 23:03 )   PT: 16.9 sec;   INR: 1.45 ratio      PTT - ( 12 May 2022 23:03 )  PTT:32.4 sec      CAPILLARY BLOOD GLUCOSE  POCT Blood Glucose.: 167 mg/dL (15 May 2022 17:03)  POCT Blood Glucose.: 206 mg/dL (15 May 2022 11:40)  POCT Blood Glucose.: 199 mg/dL (14 May 2022 21:40)    LIVER FUNCTIONS - ( 13 May 2022 08:05 )  Alb: 2.9 g/dL / Pro: 6.1 gm/dL / ALK PHOS: 71 U/L / ALT: 19 U/L / AST: 12 U/L / GGT: x           Urinalysis Basic - ( 12 May 2022 23:03 )    Color: Yellow / Appearance: very cloudy / S.025 / pH: x  Gluc: x / Ketone: Trace  / Bili: Small / Urobili: Negative   Blood: x / Protein: 100 / Nitrite: Negative   Leuk Esterase: Moderate / RBC: 3-5 /HPF / WBC >50   Sq Epi: x / Non Sq Epi: Moderate / Bacteria: Occasional    Specimen Source: Clean Catch None   Culture Results:   50,000 - 99,000 CFU/mL Escherichia coli     MEDICATIONS  (STANDING):  apixaban 2.5 milliGRAM(s) Oral every 12 hours  atorvastatin 40 milliGRAM(s) Oral at bedtime  BACItracin   Ointment 1 Application(s) Topical two times a day  cefTRIAXone   IVPB 1000 milliGRAM(s) IV Intermittent every 24 hours  dextrose 5%. 1000 milliLiter(s) (100 mL/Hr) IV Continuous <Continuous>  dextrose 5%. 1000 milliLiter(s) (50 mL/Hr) IV Continuous <Continuous>  dextrose 50% Injectable 25 Gram(s) IV Push once  dextrose 50% Injectable 12.5 Gram(s) IV Push once  dextrose 50% Injectable 25 Gram(s) IV Push once  escitalopram 20 milliGRAM(s) Oral daily  glucagon  Injectable 1 milliGRAM(s) IntraMuscular once  insulin lispro (ADMELOG) corrective regimen sliding scale   SubCutaneous three times a day before meals  insulin lispro (ADMELOG) corrective regimen sliding scale   SubCutaneous at bedtime    MEDICATIONS  (PRN):  acetaminophen     Tablet .. 650 milliGRAM(s) Oral every 6 hours PRN Temp greater or equal to 38C (100.4F), Mild Pain (1 - 3)  aluminum hydroxide/magnesium hydroxide/simethicone Suspension 30 milliLiter(s) Oral every 4 hours PRN Dyspepsia  dextrose Oral Gel 15 Gram(s) Oral once PRN Blood Glucose LESS THAN 70 milliGRAM(s)/deciliter  melatonin 3 milliGRAM(s) Oral at bedtime PRN Insomnia  ondansetron Injectable 4 milliGRAM(s) IV Push every 8 hours PRN Nausea and/or Vomiting      RADIOLOGY & ADDITIONAL TESTS:      ACC: [id number] EXAM:  CT ABDOMEN AND PELVIS                          PROCEDURE DATE:  2022    Oral Contrast: NONE      FINDINGS:  LOWER CHEST: Right lower lobe 3 mm nodule. Otherwise clear lung bases.   Aortic valve and mitral annulus calcifications.    Please note that evaluation of the abdominal organs and vascular   structures is limited by lack of intravenous contrast.    LIVER: Within normal limits.  BILE DUCTS: Normal caliber.  GALLBLADDER: Within normal limits.  SPLEEN: Within normal limits.  PANCREAS: Within normal limits.  ADRENALS: Within normal limits.  KIDNEYS/URETERS: No renal stones or hydronephrosis.    BLADDER: Minimally distended.  REPRODUCTIVE ORGANS: Hysterectomy.    BOWEL: No bowel obstruction. Appendix is within normal limits. Colonic   diverticulosis.  PERITONEUM: Trace pelvic fluid.  VESSELS: Atherosclerotic calcifications.  RETROPERITONEUM/LYMPH NODES: No lymphadenopathy.  ABDOMINAL WALL: Within normal limits.  BONES: Degenerative changes. Right hip arthroplasty.    IMPRESSION:  No renal stones or hydronephrosis.  3 mm right lower lobe pulmonary nodule. 1 year follow-up chest CT may be   obtained if there are clinical risk factors.      ACC: [id number] EXAM:  CT CERVICAL SPINE                        ACC: [id number] EXAM:  CT BRAIN                          PROCEDURE DATE:  2022          INTERPRETATION:  CLINICAL INDICATION: Neuro alert. Syncope. Fall. History   of endometrial CA,right renal mass, skin cell cancer.    TECHNIQUE: CT axial images of the head and cervical spine were obtained   without intravenous contrast. Computer-reconstructed coronal and sagittal   images were obtained.    COMPARISON: None.    FINDINGS:    Head CT: There is no obvious acute intracranial hemorrhage, large   cortical infarct, mass effect or midline shift. Nonspecific mild   periventricular and subcortical white matter lucencies likely represent   chronic microvascular ischemic changes. There is mild cerebral volume   loss with ventricular dilatation. July empty sella.    There is no depressed skull fracture. Hyperostosis frontalis interna.   There is sinus mucosal thickening. Lateral mastoid opacification.    Cervical spine CT: There isosteopenia. There is straightening of the   cervical lordosis, which may be related to patient positioning and/or   muscle spasm. There is no obvious acute fracture or traumatic   malalignment in the cervical spine. The craniocervical junction,   predental and atlantodental space are intact. Unfused C1 posterior arch.   There is minimal anterolisthesis of C7 on T1 and T2 on T3.    There are multilevel degenerative changes characterized by uncovertebral   degenerative change, disc osteophyte complex and facet arthropathy in the   cervical spine with spinal canal stenosis. No significant neural   foraminal narrowing.    There is no obvious prevertebral soft tissue edema. Evaluation of the   spinal canal content is limited on this study. There is no hematoma in   the visualized superficial soft tissue. Retropharyngeal course of the   right internal carotid artery. Heterogeneous thyroid gland, which is   obscured by artifact.    Visualized upper chest: No pneumothorax. Emphysema.    IMPRESSION:    Head CT: No obvious acute intracranial hemorrhage or displaced skull   fracture. Follow-up contrast enhanced MRI may be obtained to assess   intracranial metastasis given history.    Bilateral mastoid opacification. Recommend clinical correlation to assess   acute mastoiditis.    Cervical spine CT: Osteopenia without obvious acute fracture or traumatic   malalignment in the cervical spine. If there is clinical suspicion for   acute fracture or ligamentous/cord injury, MRI may be obtained for  further evaluation.    Heterogeneous thyroid gland, which is obscured by artifact and can be   further assessed by a nonemergent ultrasound as clinically indicated.

## 2022-05-15 NOTE — PROGRESS NOTE ADULT - ASSESSMENT
76F with PMHx of Endometrial adenocarcinoma s/p recent hysterectomy at [facility]  5/3/22, on eliquis,  MELIZA, HTN, DM2, GERD, R renal mass, L renal oncocytoma, Squamous cell skin cancer  admitted for:       # Syncope,  suspect vasovagal   pt. passed out while sitting on toilet   - tele: few episods of juan in high 30s overnight  -orthostatics negative, but on IVF   - Hold HCTZ - takes combo valsartan HCTZ at home    - Echo: Mild AS, EF 60-65%  - EEG: pending report   - D/w CArdio c/w tele, might need event monitor outPt   - D/w Dr Licona     # Sepsis. UTI  Leukocytosis improved.   lactate elevated  in ED, improved with IVF   UA - positive   f/u UCx and sensitivity   cont. IV Rocephin     # Chronic Anemia  recent hysterectomy due to endometrial CA l  H/H slightly lower, likely 2/2 IVF  Pt did not confirm bloodly BM  Check stool guaiac  anemia panel reviewed, no iron def   monitor H/H       # LUAN on CKD3  baseline creatinine  is 1.4-1.6  CT abdomen - no renal stones, no hydronephrosis   avoid nephrotoxic drugs, Valsartan, HCTZ on hold   C/w Gentle IVF , CR trending down   D/w  Dr. Lopez     # Pulmonary nodule - incidental finding on CT abdomen  3 mm right lower lobe pulmonary nodule   pt. with Hx of smoking - quit 18 years ago   follow up CT chest outpatient in 6-12 months     # HTN  - BP borderline on presentation now improved   - cont  to hold valsartan     # S/p Hysterectomy for endometrial CA last week   # robotic surgical site on abdomen with mild redness  - apply bacitracin bid     # DVT PPX: Eliquis Low dose  76F with PMHx of Endometrial adenocarcinoma s/p recent hysterectomy at [facility]  5/3/22, on eliquis,  MELIZA, HTN, DM2, GERD, R renal mass, L renal oncocytoma, Squamous cell skin cancer  admitted for:       # Syncope,  suspect vasovagal   pt. passed out while sitting on toilet   - tele: few episods of juan in high 30s night prior, no other events   -orthostatics negative, but on IVF   - Hold HCTZ - takes combo valsartan HCTZ at home    - Echo: Mild AS, EF 60-65%  - EEG: Normal EEG study. No epileptiform pattern or seizure seen.  - D/w CArdio c/w tele, might need event monitor outPt   - D/w Dr Licona     # Sepsis. UTI  Leukocytosis improved.   lactate elevated  in ED, improved with IVF   UA - positive   f/u UCx: + ECOLI   cont. IV Rocephin  # 3 tonight     # Chronic Anemia  recent hysterectomy due to endometrial CA l  H/H slightly lower, likely 2/2 IVF  Pt did not confirm bloody  BM  Check stool guaiac  anemia panel reviewed, no iron def   monitor H/H       # LUAN on CKD3  baseline creatinine  is 1.4-1.6  CT abdomen - no renal stones, no hydronephrosis   avoid nephrotoxic drugs, Valsartan, HCTZ on hold   CS/p Gentle IVF , CR trending down   D/w  Dr. Lopez     # Pulmonary nodule - incidental finding on CT abdomen  3 mm right lower lobe pulmonary nodule   pt. with Hx of smoking - quit 18 years ago   follow up CT chest outpatient in 6-12 months     # HTN  - BP borderline on presentation now improved   - cont  to hold valsartan     # S/p Hysterectomy for endometrial CA   # robotic surgical site on abdomen with mild redness  - apply bacitracin bid     # DVT PPX: Eliquis Low dose     Dispo: labs in am, C/w IV abxs, d/c tele

## 2022-05-15 NOTE — PROGRESS NOTE ADULT - ASSESSMENT
77 yo female with hx of Endometrial ca s/p hysterectomy on 5/3 at [facility], w Cr 1.4 - 1.6 during admission , now presenting near syncope episode while on toilet w LUAN     LUAN on CKD ? Prerenal after poor po intake after recent surgery while on diuretics   ( pt was having intermittent diarrhea as well)      hold diuretics ( HCTZ)  and ARB   Cr slowly improving - advised to increased oral fluids      no iv contrast or nsaid   trend Cr values   no hydro on CT    bladder /renal sono if Cr rises    Met acidosis after IVF + LUAN + Diarrhea     trend this     HC < 15 then oral or IV HCO3 gtt     d/w RN + Dr Aguila earlier today     * pt seen earlier today , note now

## 2022-05-16 ENCOUNTER — TRANSCRIPTION ENCOUNTER (OUTPATIENT)
Age: 77
End: 2022-05-16

## 2022-05-16 VITALS — SYSTOLIC BLOOD PRESSURE: 142 MMHG | HEART RATE: 67 BPM | DIASTOLIC BLOOD PRESSURE: 70 MMHG

## 2022-05-16 LAB
ANION GAP SERPL CALC-SCNC: 5 MMOL/L — SIGNIFICANT CHANGE UP (ref 5–17)
BUN SERPL-MCNC: 33 MG/DL — HIGH (ref 7–23)
CALCIUM SERPL-MCNC: 9.6 MG/DL — SIGNIFICANT CHANGE UP (ref 8.5–10.1)
CHLORIDE SERPL-SCNC: 112 MMOL/L — HIGH (ref 96–108)
CO2 SERPL-SCNC: 23 MMOL/L — SIGNIFICANT CHANGE UP (ref 22–31)
CREAT SERPL-MCNC: 1.67 MG/DL — HIGH (ref 0.5–1.3)
EGFR: 32 ML/MIN/1.73M2 — LOW
GLUCOSE SERPL-MCNC: 170 MG/DL — HIGH (ref 70–99)
HCT VFR BLD CALC: 34.4 % — LOW (ref 34.5–45)
HGB BLD-MCNC: 11.2 G/DL — LOW (ref 11.5–15.5)
MCHC RBC-ENTMCNC: 29.1 PG — SIGNIFICANT CHANGE UP (ref 27–34)
MCHC RBC-ENTMCNC: 32.6 GM/DL — SIGNIFICANT CHANGE UP (ref 32–36)
MCV RBC AUTO: 89.4 FL — SIGNIFICANT CHANGE UP (ref 80–100)
PLATELET # BLD AUTO: 283 K/UL — SIGNIFICANT CHANGE UP (ref 150–400)
POTASSIUM SERPL-MCNC: 4.2 MMOL/L — SIGNIFICANT CHANGE UP (ref 3.5–5.3)
POTASSIUM SERPL-SCNC: 4.2 MMOL/L — SIGNIFICANT CHANGE UP (ref 3.5–5.3)
RBC # BLD: 3.85 M/UL — SIGNIFICANT CHANGE UP (ref 3.8–5.2)
RBC # FLD: 14.8 % — HIGH (ref 10.3–14.5)
SODIUM SERPL-SCNC: 140 MMOL/L — SIGNIFICANT CHANGE UP (ref 135–145)
WBC # BLD: 11.66 K/UL — HIGH (ref 3.8–10.5)
WBC # FLD AUTO: 11.66 K/UL — HIGH (ref 3.8–10.5)

## 2022-05-16 PROCEDURE — 99239 HOSP IP/OBS DSCHRG MGMT >30: CPT

## 2022-05-16 RX ORDER — BACITRACIN ZINC 500 UNIT/G
1 OINTMENT IN PACKET (EA) TOPICAL
Qty: 1 | Refills: 0
Start: 2022-05-16 | End: 2022-05-22

## 2022-05-16 RX ORDER — AMLODIPINE BESYLATE 2.5 MG/1
1 TABLET ORAL
Qty: 30 | Refills: 0
Start: 2022-05-16 | End: 2022-06-14

## 2022-05-16 RX ORDER — MICONAZOLE NITRATE 2 %
1 CREAM (GRAM) TOPICAL
Qty: 3 | Refills: 0
Start: 2022-05-16 | End: 2022-05-18

## 2022-05-16 RX ORDER — AMLODIPINE BESYLATE 2.5 MG/1
5 TABLET ORAL DAILY
Refills: 0 | Status: DISCONTINUED | OUTPATIENT
Start: 2022-05-16 | End: 2022-05-16

## 2022-05-16 RX ORDER — CIPROFLOXACIN LACTATE 400MG/40ML
1 VIAL (ML) INTRAVENOUS
Qty: 8 | Refills: 0
Start: 2022-05-16 | End: 2022-05-19

## 2022-05-16 RX ORDER — ACETAMINOPHEN 500 MG
2 TABLET ORAL
Qty: 0 | Refills: 0 | DISCHARGE
Start: 2022-05-16

## 2022-05-16 RX ADMIN — Medication 1 APPLICATION(S): at 10:13

## 2022-05-16 RX ADMIN — APIXABAN 2.5 MILLIGRAM(S): 2.5 TABLET, FILM COATED ORAL at 10:12

## 2022-05-16 RX ADMIN — Medication 1: at 07:57

## 2022-05-16 RX ADMIN — Medication 2: at 11:55

## 2022-05-16 RX ADMIN — AMLODIPINE BESYLATE 5 MILLIGRAM(S): 2.5 TABLET ORAL at 15:39

## 2022-05-16 NOTE — DISCHARGE NOTE PROVIDER - NSDCMRMEDTOKEN_GEN_ALL_CORE_FT
acetaminophen 325 mg oral tablet: 2 tab(s) orally every 6 hours, As needed, Temp greater or equal to 38C (100.4F), Mild Pain (1 - 3)  amLODIPine 5 mg oral tablet: 1 tab(s) orally once a day  atorvastatin 40 mg oral tablet: 1 tab(s) orally once a day  bacitracin 500 units/g topical ointment: 1 application topically 2 times a day  Cipro 250 mg oral tablet: 1 tab(s) orally 2 times a day   Eliquis 2.5 mg oral tablet: 1 tab(s) orally 2 times a day  Lexapro 20 mg oral tablet: 1 tab(s) orally once a day  metFORMIN 500 mg oral tablet, extended release: 1 tab(s) orally once a day  Monistat 3 vaginal cream with applicator: 1 applicatorful intravaginally once a day

## 2022-05-16 NOTE — DISCHARGE NOTE PROVIDER - HOSPITAL COURSE
76F with PMHx of Endometrial adenocarcinoma s/p recent hysterectomy at [facility] last week - started on eliquis post op,  MELIZA, HTN, DM2, GERD, R renal mass, L renal oncocytoma, Squamous cell skin cancer s/p hysterectomy at [facility] on 5/3/22. Today patient was on the toilet and felt lightheaded, falling to the side and hit her head on the wall and then fell to the floor, also had a large bloody BM at the time, unknown if she hit her head but she is on Eliquis.      # Syncope - vasovagal   pt. passed out while sitting on toilet   - tele: few episodes of juan in high 30s night prior, no other events   -orthostatics negative  - Hold HCTZ - takes combo valsartan HCTZ at home    - Echo: Mild AS, EF 60-65%  - EEG: Normal EEG study. No epileptiform pattern or seizure seen.  - Cardiology and neurology consult appreciated   - Follow up with Dr. Mckeon outpatient      # Sepsis. UTI  UCx - E.coli   Leukocytosis - mild   treated with IV Rocephin  x 3   discharge on cipro renal dose 250 mg po bid for 4 more days     # Chronic Anemia  recent hysterectomy due to endometrial CA   H/H stable   Pt did not confirm bloody  BM  anemia panel reviewed, no iron def   monitor H/H outpatient with PCP     # LUAN on CKD3  baseline creatinine  is 1.4-1.6 = at present at baseline   CT abdomen - no renal stones, no hydronephrosis   avoid nephrotoxic drugs, Valsartan, HCTZ on hold   follow up with nephrology - D/w  Dr. Lopez - outpatiient      # Pulmonary nodule - incidental finding on CT abdomen  3 mm right lower lobe pulmonary nodule   pt. with Hx of smoking - quit 18 years ago   follow up CT chest outpatient in 6-12 months     # HTN  - cont. to hold valsartan-HCTZ as per cardiology - Dr. Kennedy  - started on norvasc 5 mg po daily by nephrology - Dr. Wan - will cont. on discharge     # S/p Hysterectomy for endometrial CA   # robotic surgical site on abdomen with mild redness  - apply bacitracin bid     # Vaginal yeast infection   Monistat x 3 days  follow up with PCP in one week     Pt. is stable for discharge, will follow up with cardiology/PCP and nephrology in one week.     PHYSICAL EXAM:  Vital Signs Last 24 Hrs  T(C): 36.7 (16 May 2022 08:32), Max: 36.7 (16 May 2022 08:32)  T(F): 98.1 (16 May 2022 08:32), Max: 98.1 (16 May 2022 08:32)  HR: 64 (16 May 2022 08:32) (64 - 73)  BP: 166/71 (16 May 2022 08:32) (123/79 - 166/71)  BP(mean): --  RR: 19 (16 May 2022 08:32) (19 - 19)  SpO2: 93% (16 May 2022 08:32) (93% - 98%)  General: Well developed;  in no acute distress  Eyes:  EOMI; conjunctiva and sclera clear  Head: Normocephalic; atraumatic  ENMT: No nasal discharge; airway clear  Neck: Supple; non tender; no masses  Respiratory: No wheezes, rales or rhonchi  Cardiovascular: Regular rate and rhythm. S1 and S2 Normal;   Gastrointestinal: Soft non-tender, mildly distended, Normal bowel sounds  Genitourinary: No  suprapubic  tenderness. + Vulvar whitish discharge   Extremities: No  edema  Vascular: Peripheral pulses palpable 2+ bilaterally  Neurological: Alert and oriented x3, non focal   Skin: Warm and dry. No acute rash  Musculoskeletal: Normal muscle tone, without deformities  Psychiatric: Cooperative and appropriate

## 2022-05-16 NOTE — DISCHARGE NOTE PROVIDER - CARE PROVIDER_API CALL
Kuldip Mckeon  CARDIOVASCULAR DISEASE  [address], [address]  [place] [address]  Phone: [phone]  Fax: [phone]  Follow Up Time:     Doc Lopez)  Internal Medicine; Nephrology  [address] [place] [address]  Phone: [phone]  Fax: [phone]  Follow Up Time:

## 2022-05-16 NOTE — DISCHARGE NOTE PROVIDER - NSDCCPCAREPLAN_GEN_ALL_CORE_FT
PRINCIPAL DISCHARGE DIAGNOSIS  Diagnosis: Sepsis due to Escherichia coli  Assessment and Plan of Treatment: Your tests show you have an infection in the urine  yout were treated with IV Abx  you will take antibiotics by moth - cipro 250 mg orally twice a day for 4 more days   check urine outpatient with PCP      SECONDARY DISCHARGE DIAGNOSES  Diagnosis: Syncope  Assessment and Plan of Treatment: do not take vaslartan-HCTZ   follow up wi carduolog - Dr. Mckeon in 5-7 days    Diagnosis: Acute kidney injury superimposed on CKD  Assessment and Plan of Treatment: LUAN on CKD-3   Yoru tests show you have kidney disease  you kidney function improved   you need to follow up with kidney doctor - Dr. Lopez - outpatient in one week    Diagnosis: Pulmonary nodules  Assessment and Plan of Treatment: Your test you shows you have pulmonary nodules - you sylvie to follow up with a repeat test of your lungs in 6 months to one year    Diagnosis: Vaginal yeast infection  Assessment and Plan of Treatment: Monistat x 3 days

## 2022-05-16 NOTE — PROGRESS NOTE ADULT - SUBJECTIVE AND OBJECTIVE BOX
CHIEF COMPLAINT:  Patient is a 76y old  Female who presents with a chief complaint of Syncope      HPI:  76F with PMHx of Endometrial adenocarcinoma s/p recent hysterectomy at [facility] 5/3/22 - started on eliquis post op, severe MELIZA, HTN, DM2, GERD, R renal mass, L renal oncocytoma. The day of presentation she had ab cramping, quickly got to the bathroom and sat on the toilet and reports having a loose BM. After that she felt lightheaded and like the room was closing in so she called out and her daughter came and she slumped over and hit her head. She was out for only a few seconds. There were red flecks noted in her stool, concerning it could have been blood but patient reports she ate tomatoes earlier that day and that the stool itself was yellow-brown color, no blood or black. After this event she felt tired and weak and her and her family decided she should come to the ER>    : O/N patient had some sinus bradycardia and episodes ?wandering atrial pacemaker vs first degree AV block with junctional escape beats - asymptomatic.  5/15: Patient has some sinus bradycardia and some second degree type 1 AV block while sleeping - asymptomatic. She otherwise feels fine. She has had a BM without any symptoms.  :  Patient is on 5S.  Getting treated for a UTI and following creatinine which is trending down off valsartan hct      PMHx:  PAST MEDICAL & SURGICAL HISTORY:  DM (diabetes mellitus)  HTN (hypertension)  MELIZA (obstructive sleep apnea) - severe  Endometrial cancer  Renal mass  DEpression  Coronary artery calcifications  S/P hysterectomy    Social History:  lives home with son and daughter  Stopped - smoked since age 20, stopped smoking 18 years ago,  Drinks socially  Denies recreational drug use    FAMILY HISTORY:   FAMILY HISTORY:  Family history of prostate cancer in father (Father)    ALLERGIES:  Allergies  penicillins (Unknown)    REVIEW OF SYSTEMS:  10 system ROS was obtained, all pertinent positives and negatives are in HPI otherwise they were negative.    ICU Vital Signs Last 24 Hrs  T(C): 36.6 (15 May 2022 21:44), Max: 36.7 (15 May 2022 08:31)  T(F): 97.8 (15 May 2022 21:44), Max: 98.1 (15 May 2022 08:31)  HR: 65 (15 May 2022 21:56) (65 - 73)  BP: 150/62 (15 May 2022 21:56) (123/79 - 165/67)  BP(mean): --  ABP: --  ABP(mean): --  RR: 19 (15 May 2022 21:44) (18 - 19)  SpO2: 98% (15 May 2022 21:44) (94% - 98%)      PHYSICAL EXAM:   Constitutional: awake and alert in NAD  HEENT: EOMI, Normal Hearing, MMM  Neck: Soft and supple, No LAD, No JVD, no carotid bruit  Respiratory: Breath sounds are clear bilaterally, No wheezing, rales or rhonchi, good air movement  Cardiovascular: S1 and S2, regular rate and rhythm, soft systolic murmur at apex. PMI is nondisplaced  Gastrointestinal: Bowel Sounds present, soft, nontender, nondistended, no guarding, no rebound  Extremities: Warm and well perfused, no peripheral edema  Neurological: A/O x 3, no focal deficits appreciated  Skin: No rashes appreciated    MEDICATIONS  (STANDING):  apixaban 2.5 milliGRAM(s) Oral every 12 hours  atorvastatin 40 milliGRAM(s) Oral at bedtime  BACItracin   Ointment 1 Application(s) Topical two times a day  cefTRIAXone   IVPB 1000 milliGRAM(s) IV Intermittent every 24 hours  dextrose 5%. 1000 milliLiter(s) (100 mL/Hr) IV Continuous <Continuous>  dextrose 5%. 1000 milliLiter(s) (50 mL/Hr) IV Continuous <Continuous>  dextrose 50% Injectable 25 Gram(s) IV Push once  dextrose 50% Injectable 12.5 Gram(s) IV Push once  dextrose 50% Injectable 25 Gram(s) IV Push once  escitalopram 20 milliGRAM(s) Oral daily  glucagon  Injectable 1 milliGRAM(s) IntraMuscular once  insulin lispro (ADMELOG) corrective regimen sliding scale   SubCutaneous three times a day before meals  insulin lispro (ADMELOG) corrective regimen sliding scale   SubCutaneous at bedtime    MEDICATIONS  (PRN):  acetaminophen     Tablet .. 650 milliGRAM(s) Oral every 6 hours PRN Temp greater or equal to 38C (100.4F), Mild Pain (1 - 3)  aluminum hydroxide/magnesium hydroxide/simethicone Suspension 30 milliLiter(s) Oral every 4 hours PRN Dyspepsia  dextrose Oral Gel 15 Gram(s) Oral once PRN Blood Glucose LESS THAN 70 milliGRAM(s)/deciliter  melatonin 3 milliGRAM(s) Oral at bedtime PRN Insomnia  ondansetron Injectable 4 milliGRAM(s) IV Push every 8 hours PRN Nausea and/or Vomiting    LABS: All Labs Reviewed:                        10.5   8.27  )-----------( 240      ( 14 May 2022 07:58 )             32.3     05-15    138  |  113<H>  |  32<H>  ----------------------------<  156<H>  4.6   |  17<L>  |  1.57<H>    Ca    9.1      15 May 2022 08:43      05-14    143  |  115<H>  |  37<H>  ----------------------------<  164<H>  4.4   |  21<L>  |  1.72<H>    Ca    9.0      14 May 2022 07:58    TPro  6.1  /  Alb  2.9<L>  /  TBili  0.3  /  DBili  x   /  AST  12<L>  /  ALT  19  /  AlkPhos  71  05-13    PT/INR - ( 12 May 2022 23:03 )   PT: 16.9 sec;   INR: 1.45 ratio    PTT - ( 12 May 2022 23:03 )  PTT:32.4 sec    BLOOD CULTURES: Organism --  Gram Stain Blood -- Gram Stain --  Specimen Source .Blood Blood-Venous  Culture-Blood --    RADIOLOGY:    Reviewed in EMR    EK/12/22, my interpretation: NSR at 70bpm, low voltage    TELEMETRY:    Reviewed, overnight patient had sinus bradycardia and was appears ?wandering atrial PPM vs Wenckebach with junctional escape beats    ECHO:    ACC: [id number] EXAM:  ECHO TTE WO CON COMP W DOPP                        PROCEDURE DATE:  2022        Indications   1) R55 - Syncope and collapse    M-Mode Measurements (cm)     LVEDd: 5.27 cm            LVESd: 3.41 cm   IVSEd: 1.25 cm   LVPWd: 1.09 cm            AO Root Dimension: 3.2 cm                             ACS: 1.2 cm                             LA: 4.3 cm                             LVOT: 2.1 cm    Doppler Measurements:     AV Velocity:252 cm/s                MV Peak E-Wave: 114 cm/s   AV Peak Gradient: 25.4 mmHg           MV Peak A-Wave: 140 cm/s   AV Mean Gradient: 12 mmHg             MV E/A Ratio: 0.81 %   AV Area (Continuity):1.87 cm^2        MV Peak Gradient: 5.2 mmHg   TR Velocity:172 cm/s   TR Gradient:11.8336 mmHg   Estimated RAP:3 mmHg   RVSP:15 mmHg     Summary     Mild concentric left ventricular hypertrophy is present.   Estimated left ventricular ejection fraction is 60-65 %.   Normal appearing left atrium.   The aortic valve appears mildly calcified. Valve opening seems to be restricted.   Peak and mean transaortic gradients are 25 and 12 mmHg respectively; this   finding is consistent with mild aortic stenosis at worst.   Moderate mitral annular calcification is present.   The mitral valve leaflets appear thickened.   Trace mitral regurgitation is present.   EA reversal of the mitral inflow consistent with reduced compliance of the left ventricle.   The tricuspid valve leaflets are thin and pliable; valve motion is normal.   Trace tricuspid valve regurgitation is present.   Pulmonic valve not well seen.

## 2022-05-16 NOTE — PROGRESS NOTE ADULT - ASSESSMENT
76F with PMHx of Endometrial adenocarcinoma s/p recent hysterectomy at [facility] 5/3/22 - started on eliquis post op, severe MELIZA, HTN, DM2, GERD, R renal mass, L renal oncocytoma presents after syncopal event, found to have UTI and some bradycardia overnight.    -asymptomatic bradycardia overnight - has h/o severe MELIZA not wearing CPAP. She was noncompliant with it at home and no longer has a machine. She is followed by Dr. Guan for this. Highly recommended to her to follow-up with him and try to get a mask or tx for her MELIZA.  -Her event is classic vaso-vagal syncope and believe the tele events are just due to her MELIZA and she is now much improved. Will consider an outpatient monitor to be sure - can set this up as outpatient in the office.  -Dispo as per primary team, OK to D/C from cardiac standpoint with close outpatient f/u with Dr. Mckeon.    5/16/22:  Patient remains stable from a cardiac standpoint.  Her BP is elevated but she is off her anti-hypertensives.  I will restart anti-hypertensives as an outpatient(BP will surely go even higher)  and follow her blood work.  Can discharge on PO antibiotics.

## 2022-05-16 NOTE — DISCHARGE NOTE NURSING/CASE MANAGEMENT/SOCIAL WORK - PATIENT PORTAL LINK FT
You can access the FollowMyHealth Patient Portal offered by [facility] by registering at the following website: [email or web address]. By joining [facility]’s FollowMyHealth portal, you will also be able to view your health information using other applications (apps) compatible with our system.

## 2022-05-16 NOTE — DISCHARGE NOTE NURSING/CASE MANAGEMENT/SOCIAL WORK - NSDCPEFALRISK_GEN_ALL_CORE
For information on Fall & Injury Prevention, visit: [email or web address] OR  [email or web address] OR  https://www.cdc.gov/steadi/patient.html

## 2022-05-16 NOTE — DISCHARGE NOTE PROVIDER - CARE PROVIDERS DIRECT ADDRESSES
,[email or web address]

## 2022-05-18 LAB
CULTURE RESULTS: SIGNIFICANT CHANGE UP
CULTURE RESULTS: SIGNIFICANT CHANGE UP
SPECIMEN SOURCE: SIGNIFICANT CHANGE UP
SPECIMEN SOURCE: SIGNIFICANT CHANGE UP

## 2022-05-23 DIAGNOSIS — E86.0 DEHYDRATION: ICD-10-CM

## 2022-05-23 DIAGNOSIS — I44.0 ATRIOVENTRICULAR BLOCK, FIRST DEGREE: ICD-10-CM

## 2022-05-23 DIAGNOSIS — R19.5 OTHER FECAL ABNORMALITIES: ICD-10-CM

## 2022-05-23 DIAGNOSIS — R55 SYNCOPE AND COLLAPSE: ICD-10-CM

## 2022-05-23 DIAGNOSIS — R91.1 SOLITARY PULMONARY NODULE: ICD-10-CM

## 2022-05-23 DIAGNOSIS — K21.9 GASTRO-ESOPHAGEAL REFLUX DISEASE WITHOUT ESOPHAGITIS: ICD-10-CM

## 2022-05-23 DIAGNOSIS — C54.1 MALIGNANT NEOPLASM OF ENDOMETRIUM: ICD-10-CM

## 2022-05-23 DIAGNOSIS — R19.7 DIARRHEA, UNSPECIFIED: ICD-10-CM

## 2022-05-23 DIAGNOSIS — Z88.0 ALLERGY STATUS TO PENICILLIN: ICD-10-CM

## 2022-05-23 DIAGNOSIS — F32.A DEPRESSION, UNSPECIFIED: ICD-10-CM

## 2022-05-23 DIAGNOSIS — E11.22 TYPE 2 DIABETES MELLITUS WITH DIABETIC CHRONIC KIDNEY DISEASE: ICD-10-CM

## 2022-05-23 DIAGNOSIS — D64.9 ANEMIA, UNSPECIFIED: ICD-10-CM

## 2022-05-23 DIAGNOSIS — G47.33 OBSTRUCTIVE SLEEP APNEA (ADULT) (PEDIATRIC): ICD-10-CM

## 2022-05-23 DIAGNOSIS — N18.30 CHRONIC KIDNEY DISEASE, STAGE 3 UNSPECIFIED: ICD-10-CM

## 2022-05-23 DIAGNOSIS — Z79.01 LONG TERM (CURRENT) USE OF ANTICOAGULANTS: ICD-10-CM

## 2022-05-23 DIAGNOSIS — Z20.822 CONTACT WITH AND (SUSPECTED) EXPOSURE TO COVID-19: ICD-10-CM

## 2022-05-23 DIAGNOSIS — A41.9 SEPSIS, UNSPECIFIED ORGANISM: ICD-10-CM

## 2022-05-23 DIAGNOSIS — I12.9 HYPERTENSIVE CHRONIC KIDNEY DISEASE WITH STAGE 1 THROUGH STAGE 4 CHRONIC KIDNEY DISEASE, OR UNSPECIFIED CHRONIC KIDNEY DISEASE: ICD-10-CM

## 2022-05-23 DIAGNOSIS — Z85.828 PERSONAL HISTORY OF OTHER MALIGNANT NEOPLASM OF SKIN: ICD-10-CM

## 2022-05-23 DIAGNOSIS — B37.3 CANDIDIASIS OF VULVA AND VAGINA: ICD-10-CM

## 2022-05-23 DIAGNOSIS — Z91.19 PATIENT'S NONCOMPLIANCE WITH OTHER MEDICAL TREATMENT AND REGIMEN: ICD-10-CM

## 2022-05-23 DIAGNOSIS — M85.88 OTHER SPECIFIED DISORDERS OF BONE DENSITY AND STRUCTURE, OTHER SITE: ICD-10-CM

## 2022-05-23 DIAGNOSIS — Z79.84 LONG TERM (CURRENT) USE OF ORAL HYPOGLYCEMIC DRUGS: ICD-10-CM

## 2022-05-23 DIAGNOSIS — R00.1 BRADYCARDIA, UNSPECIFIED: ICD-10-CM

## 2022-05-23 DIAGNOSIS — N17.9 ACUTE KIDNEY FAILURE, UNSPECIFIED: ICD-10-CM

## 2022-05-23 DIAGNOSIS — B96.20 UNSPECIFIED ESCHERICHIA COLI [E. COLI] AS THE CAUSE OF DISEASES CLASSIFIED ELSEWHERE: ICD-10-CM

## 2022-05-23 DIAGNOSIS — N39.0 URINARY TRACT INFECTION, SITE NOT SPECIFIED: ICD-10-CM

## 2022-05-23 DIAGNOSIS — Z79.4 LONG TERM (CURRENT) USE OF INSULIN: ICD-10-CM

## 2022-05-23 DIAGNOSIS — E87.2 ACIDOSIS: ICD-10-CM

## 2022-05-24 ENCOUNTER — APPOINTMENT (OUTPATIENT)
Dept: GYNECOLOGIC ONCOLOGY | Facility: CLINIC | Age: 77
End: 2022-05-24

## 2022-05-26 ENCOUNTER — APPOINTMENT (OUTPATIENT)
Dept: GYNECOLOGIC ONCOLOGY | Facility: CLINIC | Age: 77
End: 2022-05-26
Payer: MEDICARE

## 2022-05-26 VITALS
BODY MASS INDEX: 45.52 KG/M2 | SYSTOLIC BLOOD PRESSURE: 116 MMHG | WEIGHT: 290 LBS | DIASTOLIC BLOOD PRESSURE: 64 MMHG | HEIGHT: 67 IN

## 2022-05-26 PROCEDURE — 99024 POSTOP FOLLOW-UP VISIT: CPT

## 2022-05-26 RX ORDER — CIPROFLOXACIN HYDROCHLORIDE 250 MG/1
250 TABLET, FILM COATED ORAL
Qty: 8 | Refills: 0 | Status: DISCONTINUED | COMMUNITY
Start: 2022-05-16

## 2022-05-26 RX ORDER — DOCUSATE SODIUM 100 MG/1
100 CAPSULE, LIQUID FILLED ORAL
Qty: 12 | Refills: 0 | Status: DISCONTINUED | COMMUNITY
Start: 2022-05-03

## 2022-05-26 RX ORDER — OXYCODONE AND ACETAMINOPHEN 5; 325 MG/1; MG/1
5-325 TABLET ORAL
Qty: 20 | Refills: 0 | Status: DISCONTINUED | COMMUNITY
Start: 2022-05-03

## 2022-05-26 RX ORDER — NAPROXEN 500 MG/1
500 TABLET ORAL
Qty: 6 | Refills: 0 | Status: DISCONTINUED | COMMUNITY
Start: 2022-05-03

## 2022-05-26 RX ORDER — APIXABAN 2.5 MG/1
2.5 TABLET, FILM COATED ORAL
Qty: 56 | Refills: 0 | Status: DISCONTINUED | COMMUNITY
Start: 2022-05-03

## 2022-05-26 NOTE — ASSESSMENT
[FreeTextEntry1] : Pt is a 75 yo with Stage Ia endometrioid adenocarcinoma, with no residual disease on hysterectomy pathology only EIN. No need for adjuvant treatment. Recovering well.

## 2022-05-26 NOTE — REASON FOR VISIT
[Post Op] : post op visit [de-identified] : 5/3/22 [de-identified] : TRH, BSO, SLND [de-identified] : Pt presents for post-op check. She reports feeling abit fatigued, but overall doing great. No nausea/vomiting, no fevers/chills, no vaginal bleeding or discharge.

## 2022-05-26 NOTE — END OF VISIT
[FreeTextEntry3] : RTC in 4 weeks for cuff check [FreeTextEntry2] : Jazmin Gonzales MA was present the entire duration of the patient interaction and gynecological exam.\par

## 2022-06-17 ENCOUNTER — NON-APPOINTMENT (OUTPATIENT)
Age: 77
End: 2022-06-17

## 2022-06-17 ENCOUNTER — APPOINTMENT (OUTPATIENT)
Dept: ELECTROPHYSIOLOGY | Facility: CLINIC | Age: 77
End: 2022-06-17
Payer: MEDICARE

## 2022-06-17 VITALS
SYSTOLIC BLOOD PRESSURE: 130 MMHG | RESPIRATION RATE: 16 BRPM | HEART RATE: 75 BPM | WEIGHT: 271 LBS | OXYGEN SATURATION: 95 % | BODY MASS INDEX: 42.53 KG/M2 | HEIGHT: 67 IN | DIASTOLIC BLOOD PRESSURE: 72 MMHG

## 2022-06-17 DIAGNOSIS — R55 SYNCOPE AND COLLAPSE: ICD-10-CM

## 2022-06-17 DIAGNOSIS — Z82.49 FAMILY HISTORY OF ISCHEMIC HEART DISEASE AND OTHER DISEASES OF THE CIRCULATORY SYSTEM: ICD-10-CM

## 2022-06-17 DIAGNOSIS — R00.1 BRADYCARDIA, UNSPECIFIED: ICD-10-CM

## 2022-06-17 DIAGNOSIS — I47.2 VENTRICULAR TACHYCARDIA: ICD-10-CM

## 2022-06-17 PROCEDURE — 93000 ELECTROCARDIOGRAM COMPLETE: CPT

## 2022-06-17 PROCEDURE — 99204 OFFICE O/P NEW MOD 45 MIN: CPT

## 2022-06-17 RX ORDER — MISOPROSTOL 200 UG/1
200 TABLET ORAL
Qty: 2 | Refills: 0 | Status: DISCONTINUED | COMMUNITY
Start: 2022-03-23

## 2022-06-17 RX ORDER — MUPIROCIN 20 MG/G
2 OINTMENT TOPICAL
Qty: 22 | Refills: 0 | Status: DISCONTINUED | COMMUNITY
Start: 2022-02-09

## 2022-06-17 RX ORDER — AMLODIPINE BESYLATE 5 MG/1
5 TABLET ORAL
Qty: 30 | Refills: 0 | Status: DISCONTINUED | COMMUNITY
Start: 2022-05-16 | End: 2022-06-17

## 2022-06-17 RX ORDER — ESCITALOPRAM OXALATE 20 MG/1
20 TABLET, FILM COATED ORAL DAILY
Refills: 0 | Status: ACTIVE | COMMUNITY

## 2022-06-17 RX ORDER — BENZONATATE 100 MG/1
100 CAPSULE ORAL
Qty: 20 | Refills: 0 | Status: DISCONTINUED | COMMUNITY
Start: 2022-01-03

## 2022-06-17 RX ORDER — ASPIRIN 81 MG
81 TABLET, DELAYED RELEASE (ENTERIC COATED) ORAL DAILY
Refills: 0 | Status: ACTIVE | COMMUNITY

## 2022-06-17 RX ORDER — METFORMIN HYDROCHLORIDE 500 MG/1
500 TABLET, COATED ORAL TWICE DAILY
Qty: 60 | Refills: 3 | Status: ACTIVE | COMMUNITY

## 2022-06-17 RX ORDER — VALSARTAN AND HYDROCHLOROTHIAZIDE 320; 12.5 MG/1; MG/1
320-12.5 TABLET, FILM COATED ORAL
Refills: 0 | Status: DISCONTINUED | COMMUNITY
End: 2022-06-17

## 2022-06-17 RX ORDER — ATORVASTATIN CALCIUM 40 MG/1
40 TABLET, FILM COATED ORAL DAILY
Refills: 0 | Status: ACTIVE | COMMUNITY

## 2022-06-17 RX ORDER — NITROFURANTOIN (MONOHYDRATE/MACROCRYSTALS) 25; 75 MG/1; MG/1
100 CAPSULE ORAL
Qty: 14 | Refills: 0 | Status: DISCONTINUED | COMMUNITY
Start: 2022-01-04

## 2022-06-17 RX ORDER — CICLOPIROX 80 MG/ML
8 SOLUTION TOPICAL
Qty: 7 | Refills: 0 | Status: DISCONTINUED | COMMUNITY
Start: 2022-02-09

## 2022-06-17 NOTE — HISTORY OF PRESENT ILLNESS
[FreeTextEntry1] : 76-year-old female with history of endometrial adenocarcinoma status post hysterectomy 5/3/2022, MELIZA, HTN DM GERD renal mass left renal oncocytoma squamous cell skin cancer leg s/p resection, subcentimeter pulmonary nodules.  Pt has MELIZA not compliant with CPAP. Patient was admitted to [facility] on 5/13/2022 for syncope patient was having a bowel movement when she suddenly passed out likely vasovagal syncope.  Telemetry showed sinus rhythm few brief episodes of sinus bradycardia high 30s during the night orthostatics were negative.  HCTZ was discontinued.  TTE showed mild AAS LVEF 60%. Pt has been feeling well recently, denies palpitations, cp, sob. She has baseline mild tomas. She was waking up around 10am daily, now wakes up at 6:30am since she started going to work.  \par Holter monitor from 5/28/2022 shows brief episodes of monomorphic VT rate at 130 bpm wobbling CL 22 beats up to 12.5 seconds.  This episode occurred on 5/23/2022 at 5:41 AM.  Sinus bradycardia 34 bpm brief at 8:30 AM as well as 1 brief episode of complete AV block junctional rhythm 35 bpm at 6:38 AM on 6/1/2022\par ECG 6.17.22 ectopic atrial rhythm 70s bpm narrow QRS, nml QT

## 2022-06-29 ENCOUNTER — APPOINTMENT (OUTPATIENT)
Dept: GYNECOLOGIC ONCOLOGY | Facility: CLINIC | Age: 77
End: 2022-06-29

## 2022-06-29 PROCEDURE — 99024 POSTOP FOLLOW-UP VISIT: CPT

## 2022-06-29 NOTE — REASON FOR VISIT
[Post Op] : post op visit [de-identified] : 5/3/22 [de-identified] : TRH, BSO, SLND [de-identified] : Pt presents for second post-op check. She is happy with how surgery went and she has no concerns. No vaginal bleeding, no discharge. No nausea/vomiting.

## 2022-06-29 NOTE — DISCUSSION/SUMMARY
[Clean] : was clean [Dry] : was dry [Intact] : was intact [Erythema] : was not erythematous [Ecchymosis] : was not ecchymotic [Seroma] : had no seroma [None] : had no drainage [Normal Skin] : normal appearance [Firm] : soft [Tender] : nontender [Abnormal Bowel Sounds] : normal bowel sounds [Rebound] : no rebound tenderness [Guarding] : no guarding [Incisional Hernia] : no incisional hernia [Mass] : no palpable mass [External Genitalia Abnormal] : normal external genitalia [Vaginal Exam Abnormal] : normal vaginal exam [Doing Well] : is doing well [Excellent Pain Control] : has excellent pain control [No Sign of Infection] : is showing no signs of infection [de-identified] : + cuff intact

## 2022-06-29 NOTE — ASSESSMENT
[FreeTextEntry1] : Pt is a 75 yo with Stage Ia endometrioid adenocarcinoma, with no residual disease on hysterectomy pathology only EIN. No need for adjuvant treatment. Recovered well. Cuff well healed.\par \par The patient now reports having urinary incontinence that she would like evaluated. She is interested in urogynecology referral.

## 2022-06-29 NOTE — END OF VISIT
[FreeTextEntry3] : RTC in 4-6 months for surveillance\par Uro-gynecology consult for incontinence [FreeTextEntry2] : Jazmin Gonzales MA was present the entire duration of the patient interaction and gynecological exam.\par

## 2022-07-12 NOTE — DISCHARGE NOTE PROVIDER - NSTOBACCOUSAGEY/N_GEN_A_CS
No [Immunizations are up to date] : Immunizations are up to date [Urticaria] : urticaria [Pruritus] : pruritus [Nl] : Endocrine [FreeTextEntry4] : lip swelling  [Received Influenza Vaccine this Past Year] : patient has not received the Influenza vaccine this past year [FreeTextEntry1] : did not receive COVID vaccine

## 2022-08-12 ENCOUNTER — NON-APPOINTMENT (OUTPATIENT)
Age: 77
End: 2022-08-12

## 2022-08-12 DIAGNOSIS — R35.0 FREQUENCY OF MICTURITION: ICD-10-CM

## 2022-09-06 ENCOUNTER — APPOINTMENT (OUTPATIENT)
Dept: UROGYNECOLOGY | Facility: CLINIC | Age: 77
End: 2022-09-06

## 2022-10-20 PROBLEM — N39.0 BACTERIAL UTI: Status: ACTIVE | Noted: 2019-07-12

## 2022-10-20 PROBLEM — R33.9 URINARY RETENTION: Status: ACTIVE | Noted: 2022-10-20

## 2022-10-20 PROBLEM — N39.0 RECURRENT UTI: Status: ACTIVE | Noted: 2022-10-20

## 2022-10-20 RX ORDER — AMLODIPINE AND VALSARTAN 5; 160 MG/1; MG/1
5-160 TABLET, FILM COATED ORAL DAILY
Refills: 0 | Status: DISCONTINUED | COMMUNITY
Start: 2022-06-09 | End: 2022-10-20

## 2022-10-20 RX ORDER — NITROFURANTOIN (MONOHYDRATE/MACROCRYSTALS) 25; 75 MG/1; MG/1
100 CAPSULE ORAL TWICE DAILY
Qty: 14 | Refills: 0 | Status: DISCONTINUED | COMMUNITY
Start: 2022-08-12 | End: 2022-10-20

## 2022-10-20 NOTE — LETTER BODY
[Dear  ___] : Dear  [unfilled], [I had the pleasure of evaluating your patient, [unfilled]. Thank you for referring Ms. [unfilled] for consultation for ___] : I had the pleasure of evaluating your patient, [unfilled]. Thank you for referring Ms. [unfilled] for consultation for [unfilled]. [[facility]  ___] : Dr. RAMIREZ

## 2022-10-27 ENCOUNTER — APPOINTMENT (OUTPATIENT)
Age: 77
End: 2022-10-27

## 2022-10-27 ENCOUNTER — RESULT CHARGE (OUTPATIENT)
Age: 77
End: 2022-10-27

## 2022-10-27 VITALS — SYSTOLIC BLOOD PRESSURE: 152 MMHG | DIASTOLIC BLOOD PRESSURE: 82 MMHG

## 2022-10-27 DIAGNOSIS — R33.9 RETENTION OF URINE, UNSPECIFIED: ICD-10-CM

## 2022-10-27 DIAGNOSIS — N39.0 URINARY TRACT INFECTION, SITE NOT SPECIFIED: ICD-10-CM

## 2022-10-27 DIAGNOSIS — Z63.4 DISAPPEARANCE AND DEATH OF FAMILY MEMBER: ICD-10-CM

## 2022-10-27 DIAGNOSIS — A49.9 URINARY TRACT INFECTION, SITE NOT SPECIFIED: ICD-10-CM

## 2022-10-27 LAB
BILIRUB UR QL STRIP: NORMAL
CLARITY UR: CLEAR
COLLECTION METHOD: NORMAL
GLUCOSE UR-MCNC: 500
HCG UR QL: 0.2 EU/DL
HGB UR QL STRIP.AUTO: NORMAL
KETONES UR-MCNC: NORMAL
LEUKOCYTE ESTERASE UR QL STRIP: NORMAL
NITRITE UR QL STRIP: NORMAL
PH UR STRIP: 5.5
PROT UR STRIP-MCNC: 30
SP GR UR STRIP: 1.02

## 2022-10-27 PROCEDURE — 99204 OFFICE O/P NEW MOD 45 MIN: CPT | Mod: 25

## 2022-10-27 PROCEDURE — 51701 INSERT BLADDER CATHETER: CPT

## 2022-10-27 PROCEDURE — 81003 URINALYSIS AUTO W/O SCOPE: CPT | Mod: QW

## 2022-10-27 SDOH — SOCIAL STABILITY - SOCIAL INSECURITY: DISSAPEARANCE AND DEATH OF FAMILY MEMBER: Z63.4

## 2022-10-27 NOTE — REASON FOR VISIT
[Questionnaire Received] : Patient questionnaire received [Urinary Incontinence] : urinary incontinence [Urinary Urgency] : urinary urgency [Recurrent Urinary Infections] : recurrent urinary infections [Surgery ___] : [unfilled] [Initial Visit ___] : an initial visit for [unfilled] [FreeTextEntry2] : urinary retention

## 2022-10-27 NOTE — PHYSICAL EXAM
[Chaperone Present] : A chaperone was present in the examining room during all aspects of the physical examination [No Acute Distress] : in no acute distress [Well developed] : well developed [Well Nourished] : ~L well nourished [Oriented x3] : oriented to person, place, and time [Normal Memory] : ~T memory was ~L unimpaired [Normal Mood/Affect] : mood and affect are normal [Cough] : no cough [No Edema] : ~T edema was not present [Tenderness] : ~T no ~M abdominal tenderness observed [Distended] : not distended [None] : no CVA tenderness [Inguinal LAD] : no adenopathy was noted in the inguinal lymph nodes [Warm and Dry] : was warm and dry to touch [Normal Gait] : gait was normal [Labia Minora] : were normal [Labia Majora] : were normal [Normal Appearance] : general appearance was normal [No Bleeding] : there was no active vaginal bleeding [2] : 2 [Aa ____] : Aa [unfilled] [Ba ____] : Ba [unfilled] [C ____] : C [unfilled] [GH ____] : GH [unfilled] [PB ____] : PB [unfilled] [TVL ____] : TVL  [unfilled] [Ap ____] : Ap [unfilled] [Bp ____] : Bp [unfilled] [D ____] : D [unfilled] [Absent] : absent [Adnexa Absent] : absent bilaterally [Normal] : no abnormalities [Post Void Residual ____ml] : post void residual was [unfilled] ml

## 2022-10-27 NOTE — HISTORY OF PRESENT ILLNESS
[Cystocele (Obstetric)] : no [Uterine Prolapse] : no [Rectal Prolapse] : no [Unable To Restrain Bowel Movement] : moderate [Feelings Of Urinary Urgency] : moderate [x2] : nocturia two times a night [Pain During Urination (Dysuria)] : no [Urinary Tract Infection] : moderate [Constipation Obstructed Defecation] : no [] : no [Incomplete Emptying Of Stool] : no [Pelvic Pain] : no [Vaginal Pain] : no [Vulvar Pain] : no [FreeTextEntry1] : \par  78 y/o presents for eval of UUI/KARLEE, she reports frequency/urgency, nocturia, using pads, has seen urology in the past and tried myrbetriq but did not give it a long time, she reports that she feels like she gets UTIs, she reports intermittent stream, last UTI over 5 months ago, no pain, no vaginal bleeding\par \par had MH workup in 2018\par recent robotic hysterectomy for endometrial cancer \par UDS with DO

## 2022-10-27 NOTE — OB HISTORY
[Total Preg ___] : : [unfilled] [Full Term ___] : [unfilled] (full-term) [Vaginal ___] : [unfilled] vaginal delivery(s) [Approximately ___ (Month)] : the LMP was approximately [unfilled] month(s) ago [Last Pap Smear ___] : date of last pap smear was on [unfilled] [Abnormal Pap Smear] : abnormal pap smear [Taking Estrogens] : is not taking estrogen replacement [Sexually Active] : is not sexually active

## 2022-10-27 NOTE — DISCUSSION/SUMMARY
[FreeTextEntry1] : \freddie Jason presents with OAB, small cystocele which is asymptomatic. We reviewed her symptoms and exam findings. We discussed management options for overactive bladder including observation, behavorial modifications and bladder training, physical therapy and medications including anticholinergics and beta 3 agonists. We discussed if behavorial modifications and medications fail proceeding with urodynamics to further evaluate her symptoms. We discussed additional treatment options including sacral neuromodulation, PTNS and intra detrusor Botox. IUGA handout on overactive bladder and bladder training was given to her.\par \par [] gemtesa\par [] u/a cx\par [] BT/BMT\par [] return in 1 month

## 2022-10-27 NOTE — PROCEDURE
[Straight Catheterization] : insertion of a straight catheter [Urinary Frequency] : urinary frequency [Patient] : the patient [___ Fr Straight Tip] : a [unfilled] in [place] straight tip catheter [None] : there were no complications with the catheter insertion [Clear] : clear [No Complications] : no complications [Tolerated Well] : the patient tolerated the procedure well [Post procedure instructions and information given] : Post procedure instructions and information were given and reviewed with patient. [0] : 0

## 2022-10-28 LAB
APPEARANCE: CLEAR
BACTERIA: NEGATIVE
BILIRUBIN URINE: NEGATIVE
BLOOD URINE: NEGATIVE
COLOR: NORMAL
GLUCOSE QUALITATIVE U: ABNORMAL
HYALINE CASTS: 2 /LPF
KETONES URINE: NEGATIVE
LEUKOCYTE ESTERASE URINE: NEGATIVE
MICROSCOPIC-UA: NORMAL
NITRITE URINE: NEGATIVE
PH URINE: 5
PROTEIN URINE: NORMAL
RED BLOOD CELLS URINE: 1 /HPF
SPECIFIC GRAVITY URINE: 1.02
SQUAMOUS EPITHELIAL CELLS: 2 /HPF
UROBILINOGEN URINE: NORMAL
WHITE BLOOD CELLS URINE: 0 /HPF

## 2022-11-01 RX ORDER — VIBEGRON 75 MG/1
75 TABLET, FILM COATED ORAL
Qty: 90 | Refills: 0 | Status: DISCONTINUED | COMMUNITY
Start: 2022-10-27 | End: 2022-11-01

## 2022-11-02 LAB — BACTERIA UR CULT: NORMAL

## 2022-11-28 ENCOUNTER — APPOINTMENT (OUTPATIENT)
Dept: UROGYNECOLOGY | Facility: CLINIC | Age: 77
End: 2022-11-28

## 2022-11-28 VITALS — DIASTOLIC BLOOD PRESSURE: 74 MMHG | SYSTOLIC BLOOD PRESSURE: 132 MMHG

## 2022-11-28 DIAGNOSIS — R32 UNSPECIFIED URINARY INCONTINENCE: ICD-10-CM

## 2022-11-28 PROCEDURE — 99213 OFFICE O/P EST LOW 20 MIN: CPT | Mod: 25

## 2022-11-28 PROCEDURE — 51798 US URINE CAPACITY MEASURE: CPT

## 2022-11-28 NOTE — HISTORY OF PRESENT ILLNESS
[FreeTextEntry1] : Pt presents to office for f/u on OAB.  Hx urgency, UUI, frequency, nocturia.  She was started on gemtesa which was not covered.  Changed to myrbetriq 25mg on 11/1/22.  Today she reports about 50% improvement in her symptoms with the medication.  Notes day time is "good", still waking 1-2x/night with some nocturnal enuresis.  Denies any SE from the medication and denies any subjective feelings of incomplete emptying.

## 2022-11-28 NOTE — DISCUSSION/SUMMARY
[FreeTextEntry1] : PVR and BP normal.  We discussed increasing dose to 50mg to see if she can get any more improvement.  We did discuss that nocturia 1-2x is normal.  New rx sent and she will f/u in 4-6 weeks and see if this is giving her better symptom control.  Instructed to call with any questions or concerns and she verbalizes understanding.

## 2022-12-03 RX ORDER — ATORVASTATIN CALCIUM 80 MG/1
1 TABLET, FILM COATED ORAL
Qty: 0 | Refills: 0 | DISCHARGE

## 2022-12-03 RX ORDER — ASPIRIN/CALCIUM CARB/MAGNESIUM 324 MG
1 TABLET ORAL
Qty: 0 | Refills: 0 | DISCHARGE

## 2022-12-03 RX ORDER — APIXABAN 2.5 MG/1
1 TABLET, FILM COATED ORAL
Qty: 0 | Refills: 0 | DISCHARGE

## 2022-12-03 RX ORDER — ESCITALOPRAM OXALATE 10 MG/1
1 TABLET, FILM COATED ORAL
Qty: 0 | Refills: 0 | DISCHARGE

## 2022-12-03 RX ORDER — METFORMIN HYDROCHLORIDE 850 MG/1
1 TABLET ORAL
Qty: 0 | Refills: 0 | DISCHARGE

## 2022-12-10 NOTE — PATIENT PROFILE ADULT - NSPROEXTENSIONSOFSELF_GEN_A_NUR
none David Amador  INTERNAL MEDICINE  [address] [place] [address], [address]  [place] [address]  Phone: [phone]  Fax: [phone]  Follow Up Time: 4-6 Days

## 2022-12-21 ENCOUNTER — APPOINTMENT (OUTPATIENT)
Dept: GYNECOLOGIC ONCOLOGY | Facility: CLINIC | Age: 77
End: 2022-12-21

## 2022-12-21 VITALS — HEIGHT: 67 IN | WEIGHT: 271 LBS | BODY MASS INDEX: 42.53 KG/M2

## 2022-12-21 PROCEDURE — 99214 OFFICE O/P EST MOD 30 MIN: CPT

## 2022-12-21 NOTE — HISTORY OF PRESENT ILLNESS
[FreeTextEntry1] : Pt is a 76 yo with Stage Ia endometrioid adenocarcinoma s/p TRH, BSO, SLND on 05/03/22 with no residual disease on hysterectomy pathology only EIN. No need for adjuvant treatment. Recovered well. Cuff well healed.Pt presents to the office for a SVL visit. \par \par At our last visit the patient reported having urinary incontinence that she would like evaluation for which a referral for gyn uro was given for. Which she followed up with  on 10/27/22 which she had an insertion of a straight catheter and was started on gemtesa but was not covered so it was changed to myrbetriq for urinary frequency which pt reports about 50% improvement in her symptoms with the medication.  \par \par Interval History: She reports no vaginal bleeding. She is seeing Dr. Hurtado for overactive blader and they are titrating the medication. She reports vulvar itching. \par \par Health maintenance: \par Mammo: Script needed \par Dexa: Script needed\par Colonscopy: ?

## 2022-12-21 NOTE — ASSESSMENT
[FreeTextEntry1] : Pt is a 76 yo with Stage Ia endometrioid adenocarcinoma s/p TRH, BSO, SLND on 05/03/22 with no residual disease on hysterectomy pathology only EIN. No need for adjuvant treatment. Recovered well.

## 2022-12-21 NOTE — END OF VISIT
[FreeTextEntry3] : Empyric trial of clobetasol for lichen sclerosus\par RTC in 4-6 months for surveillance\par Follow up with Dr. Wells [FreeTextEntry2] : Jyoti Aguilera MA was present the entire duration of the patient interaction and gynecological exam.\par

## 2022-12-21 NOTE — REASON FOR VISIT
[FreeTextEntry1] : [place] Location \par [facility] \par  \par [address]\par [place] [address]\par [phone]\par

## 2023-01-03 NOTE — ED PROVIDER NOTE - CLINICAL SUMMARY MEDICAL DECISION MAKING FREE TEXT BOX
Maci Varma Patient Age: 78 year old  MESSAGE:   Patient calling to follow up on Mri results from 12-. Sent to triage line for assistance from clinical staff.         WEIGHT AND HEIGHT:   Wt Readings from Last 1 Encounters:   10/27/22 67.5 kg (148 lb 14.4 oz)     Ht Readings from Last 1 Encounters:   10/27/22 5' 2\" (1.575 m)     BMI Readings from Last 1 Encounters:   10/27/22 27.23 kg/m²       ALLERGIES:  Patient has no known allergies.  Current Outpatient Medications   Medication Sig Dispense Refill   • pantoprazole (PROTONIX) 40 MG tablet Take 1 tablet by mouth daily. 90 tablet 1   • celecoxib (CeleBREX) 200 MG capsule Take 1 capsule by mouth daily. 90 capsule 1   • amLODIPine (NORVASC) 5 MG tablet TAKE 1 TABLET BY MOUTH TWICE A  tablet 1   • brimonidine (ALPHAGAN) 0.2 % ophthalmic solution 3 x's on left eye     • HYDROcodone-acetaminophen (NORCO) 7.5-325 MG per tablet Take 1 Tab by mouth every 6 (six) hours as needed for Pain. 30 tablet 0   • estradiol (ESTRACE) 0.1 MG/GM vaginal cream Place 2 g vaginally 3 days a week. 42.5 g 1   • levothyroxine 88 MCG tablet TAKE 1 TABLET BY MOUTH EVERY DAY 90 tablet 1   • atorvastatin (LIPITOR) 40 MG tablet TAKE 1 TABLET BY MOUTH EVERY DAY 90 tablet 1   • gabapentin (NEURONTIN) 600 MG tablet Take 1 tablet by mouth in the morning and 1 tablet at noon and 1 tablet before bedtime. 270 tablet 1   • Lutein 20 MG Cap Take 1 tablet by mouth daily.     • zinc methionate 50 MG capsule Take 1 tablet by mouth as needed.     • timolol (TIMOPTIC) 0.5 % ophthalmic solution Place 1 drop into both eyes daily.     • DULoxetine (CYMBALTA) 30 MG capsule 1 cap q hs 90 capsule 1   • Multiple Vitamins-Minerals (MULTIVITAMINS PLUS ZINC PO) Take 1 tablet by mouth daily as needed.     • Omega 3-6-9 Fatty Acids (OMEGA-3-6-9 PO)      • ZINC SULFATE PO Take 50 mg by mouth daily as needed.     • Bioflavonoid Products (PERIDRIN-C) 200- MG Tab Take by mouth daily.     •  Cholecalciferol (VITAMIN D3) 2000 units Tab Take 4,000 Units by mouth 2 times daily.     • Multiple Vitamins-Calcium (ONE-A-DAY WOMENS PO) Take  by mouth. - Oral     • MAGNESIUM CHLORIDE PO Take by mouth daily.     • B COMPLEX VITAMINS PO Take by mouth daily.     • aflibercept (EYLEA) 2 MG/0.05ML Solution injection        No current facility-administered medications for this visit.     PHARMACY to use:       [facility] [address] [place] [id number] [facility] [address]  Phone: [phone] Fax: [phone]      Pharmacy preference(s) on file:   [facility] [address] [place], [place] - [facility]  [address] [facility] [address]  Phone: [phone] Fax: [phone]      CALL BACK INFO: Ok to leave response (including medical information) with family member or on answering machine  ROUTING: Patient's physician/staff        PCP: Salvador Palencia MD         INS: Payor: MEDICARE / Plan: PARTA AND B / Product Type: MEDICARE   PATIENT ADDRESS:  [address] [place] [id number]   76F with episode of syncope. ?rectal bleeding, will check occult blood, labs, ekg, cth/c-spine. NA activated.

## 2023-01-09 ENCOUNTER — APPOINTMENT (OUTPATIENT)
Dept: UROGYNECOLOGY | Facility: CLINIC | Age: 78
End: 2023-01-09
Payer: MEDICARE

## 2023-01-09 VITALS — SYSTOLIC BLOOD PRESSURE: 138 MMHG | DIASTOLIC BLOOD PRESSURE: 80 MMHG

## 2023-01-09 DIAGNOSIS — R35.1 NOCTURIA: ICD-10-CM

## 2023-01-09 DIAGNOSIS — N39.46 MIXED INCONTINENCE: ICD-10-CM

## 2023-01-09 DIAGNOSIS — R39.15 URGENCY OF URINATION: ICD-10-CM

## 2023-01-09 PROCEDURE — 51798 US URINE CAPACITY MEASURE: CPT

## 2023-01-09 PROCEDURE — 99213 OFFICE O/P EST LOW 20 MIN: CPT

## 2023-01-09 RX ORDER — MIRABEGRON 25 MG/1
25 TABLET, FILM COATED, EXTENDED RELEASE ORAL
Qty: 90 | Refills: 2 | Status: ACTIVE | COMMUNITY
Start: 2022-11-01 | End: 1900-01-01

## 2023-01-09 NOTE — PHYSICAL EXAM
[No Acute Distress] : in no acute distress [Well developed] : well developed [Well Nourished] : ~L well nourished [Good Hygeine] : demonstrates good hygeine [Oriented x3] : oriented to person, place, and time [Normal Memory] : ~T memory was ~L unimpaired [Normal Mood/Affect] : mood and affect are normal [Warm and Dry] : was warm and dry to touch [Normal Gait] : gait was normal [Normal Appearance] : ~T the appearance of the neck was normal [Cough] : no cough

## 2023-01-09 NOTE — DISCUSSION/SUMMARY
[FreeTextEntry1] : BP and PVR today normal.  We discussed the difference between KARLEE and OAB and pt aware that medication is not intended to treat KARLEE.  Discussed treatment options of PT, impressa, pessary, periurethral bulking or surgery with MUS.  Pt is unsure how she would like to proceed at this time.  Will resend rx for myrbetriq 25mg and pt will call if she desires treatment for KARLEE.   UDS 8/2019 and pt was advised she would need to repeat this test if she would like to proceed with bulking or surgery.  F/u 9 months or sooner if needed.  Instructed to call with any questions or concerns and she verbalizes understanding.

## 2023-01-09 NOTE — HISTORY OF PRESENT ILLNESS
[FreeTextEntry1] : Pt presents to office for f/u on OAB.  At last visit 11/28/22 she had good day time control with myrbetriq 25mg but was c/o nocturia x 1-2 and nocturnal enuresis.  Myrbetriq was increased to 50mg.  Per pt, this was expensive and she did not start on the higher dose.  She denies any changes since last visit.  Continues to have good day time control but notes leakage before she gets to the bathroom at night.  She thinks that the action of standing and walking is causing her to leak.  She does also report leak with cough/laugh/sneeze.  She denies dysuria.  She denies any SE from the medication and denies any subjective feelings of incomplete emptying.

## 2023-01-17 ENCOUNTER — NON-APPOINTMENT (OUTPATIENT)
Age: 78
End: 2023-01-17

## 2023-01-17 PROBLEM — C54.1 MALIGNANT NEOPLASM OF ENDOMETRIUM: Chronic | Status: ACTIVE | Noted: 2022-05-12

## 2023-01-17 PROBLEM — N28.89 OTHER SPECIFIED DISORDERS OF KIDNEY AND URETER: Chronic | Status: ACTIVE | Noted: 2022-05-12

## 2023-01-17 PROBLEM — I10 ESSENTIAL (PRIMARY) HYPERTENSION: Chronic | Status: ACTIVE | Noted: 2022-05-12

## 2023-01-17 PROBLEM — E11.9 TYPE 2 DIABETES MELLITUS WITHOUT COMPLICATIONS: Chronic | Status: ACTIVE | Noted: 2022-05-12

## 2023-01-17 PROBLEM — G47.33 OBSTRUCTIVE SLEEP APNEA (ADULT) (PEDIATRIC): Chronic | Status: ACTIVE | Noted: 2022-05-12

## 2023-01-18 ENCOUNTER — APPOINTMENT (OUTPATIENT)
Dept: ORTHOPEDIC SURGERY | Facility: CLINIC | Age: 78
End: 2023-01-18
Payer: MEDICARE

## 2023-01-18 VITALS
DIASTOLIC BLOOD PRESSURE: 71 MMHG | HEIGHT: 67 IN | BODY MASS INDEX: 42.53 KG/M2 | SYSTOLIC BLOOD PRESSURE: 140 MMHG | HEART RATE: 79 BPM | WEIGHT: 271 LBS

## 2023-01-18 DIAGNOSIS — M48.00 SPINAL STENOSIS, SITE UNSPECIFIED: ICD-10-CM

## 2023-01-18 PROCEDURE — 99213 OFFICE O/P EST LOW 20 MIN: CPT

## 2023-01-18 PROCEDURE — 73502 X-RAY EXAM HIP UNI 2-3 VIEWS: CPT | Mod: RT

## 2023-01-19 PROBLEM — M48.00 SPINAL STENOSIS, UNSPECIFIED SPINAL REGION: Status: ACTIVE | Noted: 2023-01-19

## 2023-01-20 ENCOUNTER — NON-APPOINTMENT (OUTPATIENT)
Age: 78
End: 2023-01-20

## 2023-01-23 ENCOUNTER — APPOINTMENT (OUTPATIENT)
Dept: RADIOLOGY | Facility: CLINIC | Age: 78
End: 2023-01-23
Payer: MEDICARE

## 2023-01-23 ENCOUNTER — APPOINTMENT (OUTPATIENT)
Dept: NEUROSURGERY | Facility: CLINIC | Age: 78
End: 2023-01-23
Payer: MEDICARE

## 2023-01-23 ENCOUNTER — OUTPATIENT (OUTPATIENT)
Dept: OUTPATIENT SERVICES | Facility: HOSPITAL | Age: 78
LOS: 1 days | End: 2023-01-23
Payer: MEDICARE

## 2023-01-23 VITALS
HEART RATE: 81 BPM | WEIGHT: 285 LBS | OXYGEN SATURATION: 95 % | DIASTOLIC BLOOD PRESSURE: 71 MMHG | BODY MASS INDEX: 44.73 KG/M2 | SYSTOLIC BLOOD PRESSURE: 140 MMHG | TEMPERATURE: 97.1 F | HEIGHT: 67 IN

## 2023-01-23 DIAGNOSIS — M79.604 PAIN IN RIGHT LEG: ICD-10-CM

## 2023-01-23 DIAGNOSIS — M54.50 LOW BACK PAIN, UNSPECIFIED: ICD-10-CM

## 2023-01-23 PROCEDURE — 72110 X-RAY EXAM L-2 SPINE 4/>VWS: CPT

## 2023-01-23 PROCEDURE — 72110 X-RAY EXAM L-2 SPINE 4/>VWS: CPT | Mod: 26

## 2023-01-23 PROCEDURE — 99203 OFFICE O/P NEW LOW 30 MIN: CPT

## 2023-01-23 NOTE — REVIEW OF SYSTEMS
[Joint Pain] : joint pain [Feeling Tired] : fatigue [Urinary Frequency] : urinary frequency [Anxiety] : anxiety [Negative] : Heme/Lymph

## 2023-01-23 NOTE — PHYSICAL EXAM
[de-identified] : Right Hip:\par No tenderness with internal or external rotation or axial load.  No tenderness over the greater trochanter.  Well-healed scar.  \par \par Left Hip:\par Hip: Range of Motion in Degrees:\par 	                                 Claimant:	   Normal:	\par Flexion (Active) 	                 120 	   120-degrees	\par Flexion (Passive)	                 120	   120-degrees	\par Extension (Active)	                 -30	   -30-degrees	\par Extension (Passive)	 -30	   -30-degrees	\par Abduction (Active)	                 45-50	   01-21-lxpetqs	\par Abduction (Passive)	 45-50	   17-99-rqmucko	\par Adduction (Active)  	 20-30	   54-28-anishrl	\par Adduction (Passive)	 20-30	   24-64-zxbasmf	\par Internal Rotation (Active) 	 35	   35-degrees	\par Internal Rotation (Passive)	 35	   35-degrees	\par External Rotation (Active)	 45	   45-degrees	\par External Rotation (Passive)	 45	   45-degrees	\par \par No tenderness with internal or external rotation or axial load.  No tenderness to palpation over the greater trochanter.  Negative Trendelenburg.  No tenderness with resisted abduction.  No weakness to flexion, extension, abduction or adduction.  No evidence of instability.  No motor or sensory deficits.  2+ DP and PT pulses.  Skin is intact.  No scars, rashes or lesions.  \par   [de-identified] : Gait and Station:  Ambulating with a slightly antalgic to antalgic gait.  Station:  Normal.  [de-identified] : Appearance:  Well-developed, well-nourished female in no acute distress.\par   [de-identified] : Radiographs, two to three views of the right hip with pelvis taken in the office today, show excellent position of the implant.

## 2023-01-23 NOTE — ADDENDUM
[FreeTextEntry1] : This note was written by Marsha Morales on 01/23/2023 acting as scribe for Navneet Solomon III, MD

## 2023-01-23 NOTE — CONSULT LETTER
[Dear  ___] : Dear  [unfilled], [Courtesy Letter:] : I had the pleasure of seeing your patient, [unfilled], in my office today. [Sincerely,] : Sincerely, [FreeTextEntry2] : Kuldip Mckeon MD\par [address]\[place] [address]\par  [FreeTextEntry1] : Casie Peterson is a 77-year-old female who presents today with lumbar symptoms.  Patient reports symptoms started approximately 6 months ago.  She reports possibly getting out of a high vehicle which may have caused her lower back symptoms.  She reports an ache in her right lower back into the right buttocks.  She reports the pain does not radiate however she has a sharp and aching pain in her right anterior thigh with walking.  She denies lower extremity shooting pains, numbness tingling or weakness.  Denies tripping over her feet or bowel or bladder dysfunction.  Patient reports Aleve provides her with significant benefit.  Patient with history of diabetes, endometrial cancer, and hip replacement 2008.  There is no imaging to review with the patient.  Patient is alert and oriented.  No distress noted.  Strength to bilateral legs 5/5.  Negative clonus.  Sensation to light touch lower extremities equal and normal.  Absent bilateral Achilles tendon reflexes.  Bilateral patellar reflexes present and equal.  Patient is walking with a antalgic gait due to pain.  I provided the patient with a prescription for an x-ray of the lumbar spine.  Provided patient with a referral for physical therapy.  We will follow-up in 6 weeks to evaluate for progression however patient aware to call with any further questions or concerns or with any new or worsening symptoms. [FreeTextEntry3] : Ellen Queen, MSN, [facility] Department of Neurosurgery  [address], [address] [place] [address] Office: [phone] Fax: [phone]

## 2023-01-23 NOTE — DISCUSSION/SUMMARY
[de-identified] : I believe this is more of a spinal stenosis.  At this time, I recommend the patient undergo spine evaluation.

## 2023-01-23 NOTE — CONSULT LETTER
[Dear  ___] : Dear  [unfilled], [Consult Letter:] : I had the pleasure of evaluating your patient, [unfilled]. [Please see my note below.] : Please see my note below. [Consult Closing:] : Thank you very much for allowing me to participate in the care of this patient.  If you have any questions, please do not hesitate to contact me. [Sincerely,] : Sincerely, [FreeTextEntry3] : Navneet Solomon, III, MD\par   [DrZen  ___] : Dr. RAMIREZ

## 2023-01-23 NOTE — HISTORY OF PRESENT ILLNESS
[de-identified] : The patient comes in today with complaints of pain, she states to her right hip, but on further questioning, she notes pain from her back to the anterior aspect of her thigh and getting worse, especially when she is in the shower and she goes into extension.  The patient states the onset/injury occurred 09/01/2022.  This injury is not work related or due to an automobile accident.  The patient states the pain is radiating.  The patient describes the pain as sharp, achy and stabbing. [de-identified] : walking [de-identified] : rest [8] : the ailment interference is 8/10 [] : No

## 2023-03-29 ENCOUNTER — APPOINTMENT (OUTPATIENT)
Dept: NEUROSURGERY | Facility: CLINIC | Age: 78
End: 2023-03-29
Payer: MEDICARE

## 2023-03-29 VITALS
OXYGEN SATURATION: 93 % | SYSTOLIC BLOOD PRESSURE: 131 MMHG | BODY MASS INDEX: 43.95 KG/M2 | HEIGHT: 67 IN | WEIGHT: 280 LBS | HEART RATE: 81 BPM | DIASTOLIC BLOOD PRESSURE: 70 MMHG

## 2023-03-29 DIAGNOSIS — M54.50 LOW BACK PAIN, UNSPECIFIED: ICD-10-CM

## 2023-03-29 PROCEDURE — 99213 OFFICE O/P EST LOW 20 MIN: CPT

## 2023-03-29 NOTE — CONSULT LETTER
[Dear  ___] : Dear  [unfilled], [Courtesy Letter:] : I had the pleasure of seeing your patient, [unfilled], in my office today. [Sincerely,] : Sincerely, [FreeTextEntry2] : Kuldip Mckeon  [address] [facility] [address] [place] [id number]  [FreeTextEntry1] : Casie Peterson is a 77-year-old who returns today for her lower back symptoms.  Patient has been undergoing physical therapy with good benefit.  She reports the thigh pain she previously had while walking has resolved.  She continues to experience chronic right-sided lower back ache.  This only occurs with prolonged walking and has improved with physical therapy.  She denies any lower extremity weakness.  She reports paresthesias to bilateral feet secondary to possible diabetes.  She denies bowel or bladder dysfunction or saddle anesthesia.  She denies any dropfoot or tripping over her feet.\par \par Patient underwent x-ray of the lumbar spine performed on 1/23/2023.  Report without any fracture or spondylolisthesis.  Report indicates degeneration disease and disc space narrowing with osteophyte formation posterior facet arthrosis throughout.  Broad slight lumbar curvature noted in report as well.\par \par Patient is alert and oriented.  No distress noted.  No walking difficulties noted.  Strength to bilateral legs 5/5.  Negative clonus.  Bilateral patellar reflexes present and equal.  Unable to elicit Achilles tendon reflexes.\par \par At this time patient would like to hold off on any advanced imaging as her symptoms are improving with physical therapy.  I do believe this is reasonable however patient is aware of symptoms that would warrant an ER visit or symptoms that would warrant further advanced imaging.  Advised patient to continue with physical therapy.  New Rx provided.  Patient will call office should she need further follow-up.  Patient aware to call with any further questions or concerns or with any new or worsening symptoms. [FreeTextEntry3] : Ellen Queen, MSN, [facility] Department of Neurosurgery  [address], [address] [place] [address] Office: [phone] Fax: [phone]

## 2023-05-10 ENCOUNTER — APPOINTMENT (OUTPATIENT)
Dept: GYNECOLOGIC ONCOLOGY | Facility: CLINIC | Age: 78
End: 2023-05-10

## 2023-05-10 ENCOUNTER — NON-APPOINTMENT (OUTPATIENT)
Age: 78
End: 2023-05-10

## 2023-05-27 ENCOUNTER — OFFICE (OUTPATIENT)
Dept: URBAN - METROPOLITAN AREA CLINIC 12 | Facility: CLINIC | Age: 78
Setting detail: OPHTHALMOLOGY
End: 2023-05-27
Payer: MEDICARE

## 2023-05-27 DIAGNOSIS — H43.393: ICD-10-CM

## 2023-05-27 DIAGNOSIS — H35.033: ICD-10-CM

## 2023-05-27 DIAGNOSIS — E11.9: ICD-10-CM

## 2023-05-27 DIAGNOSIS — H35.3131: ICD-10-CM

## 2023-05-27 PROBLEM — H35.52 RETINITIS PIGMENTOSA: Status: ACTIVE | Noted: 2023-05-27

## 2023-05-27 PROBLEM — H52.7 REFRACTIVE ERROR ; BOTH EYES: Status: ACTIVE | Noted: 2023-05-27

## 2023-05-27 PROCEDURE — 92012 INTRM OPH EXAM EST PATIENT: CPT | Performed by: OPTOMETRIST

## 2023-05-27 ASSESSMENT — SPHEQUIV_DERIVED
OS_SPHEQUIV: 0.25
OD_SPHEQUIV: 0.125
OS_SPHEQUIV: 0.125
OD_SPHEQUIV: -2.5
OS_SPHEQUIV: 0.125
OD_SPHEQUIV: -2.5

## 2023-05-27 ASSESSMENT — REFRACTION_MANIFEST
OS_VA1: 20/20
OD_VA1: 20/20
OS_SPHERE: +0.50
OD_CYLINDER: -0.25
OD_ADD: +2.75
OS_AXIS: 093
OS_CYLINDER: -0.50
OD_AXIS: 020
OS_SPHERE: +0.50
OS_AXIS: 100
OD_SPHERE: -0.50
OD_SPHERE: +0.25
OS_ADD: +2.75
OS_CYLINDER: -0.75
OD_CYLINDER: -4.00
OD_AXIS: 084

## 2023-05-27 ASSESSMENT — REFRACTION_CURRENTRX
OS_OVR_VA: 20/
OD_SPHERE: +2.50
OD_OVR_VA: 20/
OD_VPRISM_DIRECTION: SV
OD_OVR_VA: 20/
OS_VPRISM_DIRECTION: SV
OS_OVR_VA: 20/
OS_SPHERE: +2.50

## 2023-05-27 ASSESSMENT — KERATOMETRY
METHOD_AUTO_MANUAL: AUTO
OD_AXISANGLE_DEGREES: 106
OS_K2POWER_DIOPTERS: 45.00
OS_K1POWER_DIOPTERS: 44.50
OD_K1POWER_DIOPTERS: 45.00
OD_K2POWER_DIOPTERS: 45.75
OS_AXISANGLE_DEGREES: 028

## 2023-05-27 ASSESSMENT — PACHYMETRY
OD_CT_UM: 527
OS_CT_CORRECTION: 0
OD_CT_CORRECTION: 1
OS_CT_UM: 540

## 2023-05-27 ASSESSMENT — AXIALLENGTH_DERIVED
OD_AL: 23.8807
OS_AL: 23.0482
OD_AL: 23.8807
OD_AL: 22.8769
OS_AL: 23.0948
OS_AL: 23.0948

## 2023-05-27 ASSESSMENT — LID EXAM ASSESSMENTS
OD_BLEPHARITIS: 1+
OS_BLEPHARITIS: 1+

## 2023-05-27 ASSESSMENT — REFRACTION_AUTOREFRACTION
OS_SPHERE: +0.50
OS_CYLINDER: -0.75
OD_SPHERE: -0.50
OD_CYLINDER: -4.00
OS_AXIS: 101
OD_AXIS: 019

## 2023-05-27 ASSESSMENT — CONFRONTATIONAL VISUAL FIELD TEST (CVF)
OS_FINDINGS: FULL
OD_FINDINGS: FULL

## 2023-05-27 ASSESSMENT — TONOMETRY
OS_IOP_MMHG: 20
OD_IOP_MMHG: 20

## 2023-05-27 ASSESSMENT — CORNEAL DYSTROPHY - POSTERIOR
OS_POSTERIORDYSTROPHY: GUTTATA
OD_POSTERIORDYSTROPHY: GUTTATA

## 2023-05-27 ASSESSMENT — VISUAL ACUITY
OD_BCVA: 20/25+1
OS_BCVA: 20/25+1

## 2023-06-12 NOTE — H&P PST ADULT - OTHER CARE PROVIDERS
Dr. Antoine  [phone] Patient with hx of gout, no recent flare ups. Home med: allopurinol 300mg qd.  - c/w home med Dr. Antoine  [phone](Pt PCP and cardiologist) Dr. Mckeon  [phone](Pt PCP and cardiologist)

## 2023-07-12 ENCOUNTER — APPOINTMENT (OUTPATIENT)
Dept: GYNECOLOGIC ONCOLOGY | Facility: CLINIC | Age: 78
End: 2023-07-12
Payer: MEDICARE

## 2023-07-12 VITALS — WEIGHT: 285 LBS | HEIGHT: 67 IN | BODY MASS INDEX: 44.73 KG/M2

## 2023-07-12 DIAGNOSIS — B37.31 ACUTE CANDIDIASIS OF VULVA AND VAGINA: ICD-10-CM

## 2023-07-12 DIAGNOSIS — L90.0 LICHEN SCLEROSUS ET ATROPHICUS: ICD-10-CM

## 2023-07-12 PROCEDURE — 99214 OFFICE O/P EST MOD 30 MIN: CPT

## 2023-07-12 RX ORDER — FLUCONAZOLE 150 MG/1
150 TABLET ORAL DAILY
Qty: 1 | Refills: 0 | Status: ACTIVE | COMMUNITY
Start: 2023-07-12 | End: 1900-01-01

## 2023-07-12 RX ORDER — NYSTATIN AND TRIAMCINOLONE ACETONIDE 100000; 1 MG/G; MG/G
100000-0.1 CREAM TOPICAL 3 TIMES DAILY
Qty: 2 | Refills: 0 | Status: ACTIVE | COMMUNITY
Start: 2023-07-12 | End: 1900-01-01

## 2023-07-12 NOTE — REASON FOR VISIT
[FreeTextEntry1] : [place] Location \par \[facility]. [phone]

## 2023-07-12 NOTE — END OF VISIT
[FreeTextEntry3] : RTC in 1 month for re-evaluation\par Follow up with uro-gynecology for incontinence [FreeTextEntry2] : Jyoti Aguilera MA was present the entire duration of the patient interaction and gynecological exam.\par

## 2023-07-12 NOTE — HISTORY OF PRESENT ILLNESS
[FreeTextEntry1] : Pt is a 76 yo with Stage Ia endometrioid adenocarcinoma s/p TRH, BSO, SLND on 05/03/22 with no residual disease on hysterectomy pathology only EIN. No need for adjuvant treatment. Recovered well. Patient is on Empyric trial of clobetasol for lichen sclerosus. She presents to office for a follow up visit. \par \par \par Patient reports that she was given options by  but she did not follow up and her bladder leaking is getting worse. She needs to follow up.\par \par She reprots the vulvar itching has been the main issue. The cream helps the itcthing but only when applied. She reports no vaginal bleeding or discharge. No changes in medication or medical management.

## 2023-07-12 NOTE — PHYSICAL EXAM
[Chaperone Present] : A chaperone was present in the examining room during all aspects of the physical examination [FreeTextEntry1] : Jyoti Aguilera MA was present the entire duration of the patient interaction and gynecological exam. [Absent] : Adnexa(ae): Absent [Normal] : Anus and perineum: Normal sphincter tone, no masses, no prolapse. [de-identified] : + vulva with thinning and erythema in the folds consistent with candidiasis [de-identified] : no lesions or ulcers, no blood or discharge [Restricted in physically strenuous activity but ambulatory and able to carry out work of a light or sedentary nature] : Status 1- Restricted in physically strenuous activity but ambulatory and able to carry out work of a light or sedentary nature, e.g., light house work, office work

## 2023-07-12 NOTE — ASSESSMENT
[FreeTextEntry1] : Pt is a 76 yo with Stage Ia endometrioid adenocarcinoma s/p TRH, BSO, SLND on 05/03/22 with no residual disease on hysterectomy pathology only EIN. No need for adjuvant treatment.  No evidence of disease on exam and no abnormal lesions. Low risk for recurrence.\par \par Patient has bad stress incontinence and I recommend following with urogynecology. She has significant evidence of chronic moisture in the vulva likely causing a candidal infection. Will treat with fluconazole and trimacinolone/nystatin. If no improvement will need to take for biopsy of the vulva.

## 2023-07-13 ENCOUNTER — RX CHANGE (OUTPATIENT)
Age: 78
End: 2023-07-13

## 2023-07-26 ENCOUNTER — RX CHANGE (OUTPATIENT)
Age: 78
End: 2023-07-26

## 2023-07-28 ENCOUNTER — RX CHANGE (OUTPATIENT)
Age: 78
End: 2023-07-28

## 2023-07-28 RX ORDER — BETAMETHASONE DIPROPIONATE 0.5 MG/G
0.05 CREAM TOPICAL TWICE DAILY
Qty: 1 | Refills: 0 | Status: ACTIVE | COMMUNITY
Start: 2023-07-28 | End: 1900-01-01

## 2023-07-28 RX ORDER — NYSTATIN 100000 [USP'U]/G
100000 CREAM TOPICAL TWICE DAILY
Qty: 1 | Refills: 0 | Status: ACTIVE | COMMUNITY
Start: 2023-07-28 | End: 1900-01-01

## 2023-08-16 ENCOUNTER — APPOINTMENT (OUTPATIENT)
Dept: GYNECOLOGIC ONCOLOGY | Facility: CLINIC | Age: 78
End: 2023-08-16
Payer: MEDICARE

## 2023-08-16 PROCEDURE — 99213 OFFICE O/P EST LOW 20 MIN: CPT

## 2023-08-16 NOTE — END OF VISIT
[FreeTextEntry3] : RTC in 6 months for surveillance and vulvar exam [FreeTextEntry2] : Jyoti Aguilera MA was present the entire duration of the patient interaction and gynecological exam.

## 2023-08-16 NOTE — PHYSICAL EXAM
[Chaperone Present] : A chaperone was present in the examining room during all aspects of the physical examination [FreeTextEntry1] : Jyoti Aguilera MA was present the entire duration of the patient interaction and gynecological exam. [Absent] : Adnexa(ae): Absent [Normal] : Anus and perineum: Normal sphincter tone, no masses, no prolapse. [de-identified] : no leukoplakia or concern for dysplasia [Ambulatory and capable of all self care but unable to carry out any work activities] : Status 2- Ambulatory and capable of all self care but unable to carry out any work activities. Up and about more than 50% of waking hours

## 2023-08-16 NOTE — REASON FOR VISIT
[FreeTextEntry1] : [facility] [place]. [phone] [address] [place] [address]

## 2023-08-16 NOTE — ASSESSMENT
[FreeTextEntry1] : This 77 yo with Stage Ia endometrioid adenocarcinoma s/p TRH, BSO, SLND on 05/03/22 with no residual disease on hysterectomy pathology only EIN. No need for adjuvant treatment.   Resolution of the candida infection and now recommend maintenance with clobetasol 2-3 times per week. Will re-evaluate in 6 months at the time of surveillance visit.   No evidence of disease or vulvar dysplasia.

## 2023-08-16 NOTE — HISTORY OF PRESENT ILLNESS
[FreeTextEntry1] : This 79 yo with Stage Ia endometrioid adenocarcinoma s/p TRH, BSO, SLND on 05/03/22 with no residual disease on hysterectomy pathology only EIN. No need for adjuvant treatment.  Patient is on Empyric trial of clobetasol for lichen sclerosus. She presents to office for a 1 month follow up visit.  She was last seen in July and patient reported that she was given options by  but she did not follow up and her bladder leaking is getting worse. She needs to follow up.  She reported the vulvar itching has been the main issue. The cream helps the itching but only when applied. She reports no vaginal bleeding or discharge. No changes in medication or medical management.  I advised the patient that she had bad stress incontinence and recommended following up with urogyn. On exam she had significant evidence of chronic moisture in the vulva likely causing a candida infection, I prescribed fluconazole and triamcinolone/nystatin, and advised the patient if there was no improvement, I would have to perform a biopsy of the vulva.

## 2023-12-08 PROBLEM — H53.2 DIPLOPIA ; BOTH EYES: Status: ACTIVE | Noted: 2023-12-08

## 2024-02-13 NOTE — ASU PATIENT PROFILE, ADULT - NSALCOHOLAMT_GEN_A_CORE_SD
Status post revision  Follow-up with podiatry outpatient  Follow-up with vascular outpatient  Wound care per surgeon as listed in EMR  Pain control  Therapy   1-2 drinks

## 2024-02-14 ENCOUNTER — APPOINTMENT (OUTPATIENT)
Dept: GYNECOLOGIC ONCOLOGY | Facility: CLINIC | Age: 79
End: 2024-02-14
Payer: MEDICARE

## 2024-02-14 VITALS — BODY MASS INDEX: 45.52 KG/M2 | WEIGHT: 290 LBS | HEIGHT: 67 IN

## 2024-02-14 DIAGNOSIS — C54.1 MALIGNANT NEOPLASM OF ENDOMETRIUM: ICD-10-CM

## 2024-02-14 PROCEDURE — G2211 COMPLEX E/M VISIT ADD ON: CPT

## 2024-02-14 PROCEDURE — 99459 PELVIC EXAMINATION: CPT

## 2024-02-14 PROCEDURE — 99213 OFFICE O/P EST LOW 20 MIN: CPT

## 2024-02-14 RX ORDER — CLOBETASOL PROPIONATE 0.5 MG/G
0.05 CREAM TOPICAL
Qty: 1 | Refills: 2 | Status: ACTIVE | COMMUNITY
Start: 2022-12-21 | End: 1900-01-01

## 2024-02-14 NOTE — PHYSICAL EXAM
[Chaperone Present] : A chaperone was present in the examining room during all aspects of the physical examination [FreeTextEntry1] : Jyoti Aguilera MA was present the entire duration of the patient interaction and gynecological exam. [Absent] : Adnexa(ae): Absent [Normal] : Anus and perineum: Normal sphincter tone, no masses, no prolapse. [de-identified] : no leukoplakia [de-identified] : no discharge, prolapse of vaginal walls [de-identified] : no nodularity or masses

## 2024-02-14 NOTE — ASSESSMENT
[FreeTextEntry1] : Pt his 79 yo with Stage Ia endometrioid adenocarcinoma s/p TRH, BSO, SLND on 05/03/22 with no residual disease on hysterectomy pathology only EIN.   Will provided refill for clobetasol. No lesions on exam today concerning for dysplasia.  We discussed that she is now almost 2 year since cancer, we will continue very 6 months surveillance for 1 more year, then yearly for up to 5 years as she is very low risk for recurrence.

## 2024-02-14 NOTE — HISTORY OF PRESENT ILLNESS
[FreeTextEntry1] : This 79 yo with Stage Ia endometrioid adenocarcinoma s/p TRH, BSO, SLND on 05/03/22 with no residual disease on hysterectomy pathology only EIN. No need for adjuvant treatment. Patient returns to the office today for a routine svl visit.   At patients last visit, exam with resolution of the candida infection and now recommend maintenance with clobetasol 2-3 times per week. No evidence of disease or vulvar dysplasia. She reports her son recently passed away due to suicide even though he was admited at hospital and watched for suicidal ideation. The hospital changed policies due to this tragedy. She reports persistent urinary incontinence, but has not followed up.

## 2024-02-14 NOTE — REASON FOR VISIT
[FreeTextEntry1] : [facility] [place]. [phone] [address] [place] [address]

## 2024-02-14 NOTE — END OF VISIT
[FreeTextEntry3] : RTC in 6 months for surveillance Clobetasol refill [FreeTextEntry2] : Jyoti Aguilera MA was present the entire duration of the patient interaction and gynecological exam.

## 2024-07-12 NOTE — ASU PREOP CHECKLIST - AS BP NONINV SITE
Quality 137: Melanoma: Continuity Of Care - Recall System: Patient information entered into a recall system that includes: target date for the next exam specified AND a process to follow up with patients regarding missed or unscheduled appointments Quality 226: Preventive Care And Screening: Tobacco Use: Screening And Cessation Intervention: Patient screened for tobacco use and is an ex/non-smoker Detail Level: Detailed left upper arm

## 2024-08-12 NOTE — REVIEW OF SYSTEMS
Patient called stating that she hasn't had a period in 3 years and started spotting two days ago.  Please advise.   [see HPI] : see HPI

## 2024-08-22 ENCOUNTER — APPOINTMENT (OUTPATIENT)
Dept: UROGYNECOLOGY | Facility: CLINIC | Age: 79
End: 2024-08-22

## 2024-08-22 VITALS
OXYGEN SATURATION: 92 % | HEART RATE: 90 BPM | BODY MASS INDEX: 43.63 KG/M2 | DIASTOLIC BLOOD PRESSURE: 78 MMHG | HEIGHT: 67 IN | WEIGHT: 278 LBS | SYSTOLIC BLOOD PRESSURE: 146 MMHG

## 2024-08-22 LAB
BILIRUB UR QL STRIP: NEGATIVE
CLARITY UR: CLEAR
COLLECTION METHOD: NORMAL
GLUCOSE UR-MCNC: NORMAL
HCG UR QL: 0.2 EU/DL
HGB UR QL STRIP.AUTO: NEGATIVE
KETONES UR-MCNC: NEGATIVE
LEUKOCYTE ESTERASE UR QL STRIP: NEGATIVE
NITRITE UR QL STRIP: NEGATIVE
PH UR STRIP: 5
PROT UR STRIP-MCNC: NORMAL
SP GR UR STRIP: 1.02

## 2024-08-22 PROCEDURE — 99459 PELVIC EXAMINATION: CPT

## 2024-08-22 PROCEDURE — 51798 US URINE CAPACITY MEASURE: CPT

## 2024-08-22 PROCEDURE — 99214 OFFICE O/P EST MOD 30 MIN: CPT

## 2024-08-22 PROCEDURE — 81003 URINALYSIS AUTO W/O SCOPE: CPT | Mod: QW

## 2024-08-22 RX ORDER — VIBEGRON 75 MG/1
75 TABLET, FILM COATED ORAL
Qty: 90 | Refills: 3 | Status: ACTIVE | COMMUNITY
Start: 2024-08-22 | End: 1900-01-01

## 2024-08-22 NOTE — PHYSICAL EXAM
[Chaperone Present] : A chaperone was present in the examining room during all aspects of the physical examination [74579] : A chaperone was present during the pelvic exam. [No Acute Distress] : in no acute distress [Well developed] : well developed [Well Nourished] : ~L well nourished [Oriented x3] : oriented to person, place, and time [No Edema] : ~T edema was not present [Warm and Dry] : was warm and dry to touch [Aa ____] : Aa [unfilled] [Ba ____] : Ba [unfilled] [C ____] : C [unfilled] [GH ____] : GH [unfilled] [PB ____] : PB [unfilled] [TVL ____] : TVL  [unfilled] [Ap ____] : Ap [unfilled] [Bp ____] : Bp [unfilled] [D ____] : D [unfilled] [Vulvar Atrophy] : vulvar atrophy [Labia Majora] : were normal [Labia Minora] : were normal [Normal Appearance] : general appearance was normal [Atrophy] : atrophy [Normal] : was normal [Absent] : absent [FreeTextEntry2] :  Vanessa Alejo [Cough] : no cough [Tenderness] : ~T no ~M abdominal tenderness observed [Distended] : not distended

## 2024-08-22 NOTE — PHYSICAL EXAM
[Chaperone Present] : A chaperone was present in the examining room during all aspects of the physical examination [10260] : A chaperone was present during the pelvic exam. [No Acute Distress] : in no acute distress [Well developed] : well developed [Well Nourished] : ~L well nourished [Oriented x3] : oriented to person, place, and time [No Edema] : ~T edema was not present [Warm and Dry] : was warm and dry to touch [Aa ____] : Aa [unfilled] [Ba ____] : Ba [unfilled] [C ____] : C [unfilled] [GH ____] : GH [unfilled] [PB ____] : PB [unfilled] [TVL ____] : TVL  [unfilled] [Ap ____] : Ap [unfilled] [Bp ____] : Bp [unfilled] [D ____] : D [unfilled] [Vulvar Atrophy] : vulvar atrophy [Labia Majora] : were normal [Labia Minora] : were normal [Normal Appearance] : general appearance was normal [Atrophy] : atrophy [Normal] : was normal [Absent] : absent [FreeTextEntry2] :  Vanessa Alejo [Cough] : no cough [Tenderness] : ~T no ~M abdominal tenderness observed [Distended] : not distended

## 2024-08-22 NOTE — ADDENDUM
[FreeTextEntry1] : This note was written by Vanessa Alejo, acting as the  for Dr. Sullivan. This note accurately reflects the work and decisions made by Dr. Sullivan.

## 2024-08-22 NOTE — DISCUSSION/SUMMARY
[FreeTextEntry1] : RIZWAN is a 79 year female who presents for f/u on OAB. Not currently taking Myrbetriq. She wishes to try a medication again.  [] Gemtesa 75mg - rx sent risks/benefits discussed  if no improvement, UDS  f/u 1 month All questions answered.

## 2024-08-22 NOTE — HISTORY OF PRESENT ILLNESS
[Cystocele (Obstetric)] : no [Uterine Prolapse] : no [Rectal Prolapse] : no [Unable To Restrain Bowel Movement] : moderate [Feelings Of Urinary Urgency] : moderate [x2] : nocturia two times a night [Pain During Urination (Dysuria)] : no [Urinary Tract Infection] : moderate [Constipation Obstructed Defecation] : no [] : no [Incomplete Emptying Of Stool] : no [Pelvic Pain] : no [Vaginal Pain] : no [Vulvar Pain] : no [FreeTextEntry1] : RIZWAN is a 79 year female who presents for f/u on OAB. Last seen in office on 01/09/2023, renewed rx for Myrbetriq 25mg. Patient also has KARLEE and asymptomatic small cystocele, not currently treated. States Mybretriq was not working, no longer taking it. Reports bulge.

## 2024-09-23 ENCOUNTER — APPOINTMENT (OUTPATIENT)
Dept: UROGYNECOLOGY | Facility: CLINIC | Age: 79
End: 2024-09-23

## 2024-09-23 VITALS
DIASTOLIC BLOOD PRESSURE: 70 MMHG | HEIGHT: 67 IN | BODY MASS INDEX: 43.63 KG/M2 | SYSTOLIC BLOOD PRESSURE: 130 MMHG | WEIGHT: 278 LBS | OXYGEN SATURATION: 96 % | HEART RATE: 85 BPM

## 2024-09-23 DIAGNOSIS — R32 UNSPECIFIED URINARY INCONTINENCE: ICD-10-CM

## 2024-09-23 DIAGNOSIS — R35.1 NOCTURIA: ICD-10-CM

## 2024-09-23 DIAGNOSIS — R39.15 URGENCY OF URINATION: ICD-10-CM

## 2024-09-23 PROCEDURE — 51798 US URINE CAPACITY MEASURE: CPT

## 2024-09-23 PROCEDURE — 99213 OFFICE O/P EST LOW 20 MIN: CPT

## 2024-09-26 NOTE — HISTORY OF PRESENT ILLNESS
[FreeTextEntry1] : Pt presents to office for f/u on OAB.  Hx frequency, urgency, UUI, nocturia had been on myrbetriq in the past which wasn't helping.  She was started on gemtesa at last visit.  She reports about 70% improvement in her symptoms with this and is happy with results but the medication is expensive ($130 for 30 days).  She denies any SE from the medication and denies any subjective feelings of incomplete emptying.  She has KARLEE and small asymptomatic cystocele.  She denies any dysuria or UTI symptoms today.

## 2024-09-26 NOTE — DISCUSSION/SUMMARY
[FreeTextEntry1] : Pt happy with gemtesa 75mg but it is expensive. We reviewed third line treatment options including PTNS, botox and SNM.  She would like to continue with gemtesa for now.  She will call if she wants to proceed with anything else.  F/u 6-9 months or sooner if needed.  Instructed to call with any questions or concerns and she verbalizes understanding.

## 2025-03-09 NOTE — DISCHARGE NOTE PROVIDER - NSDCQMSTAIRS_GEN_ALL_CORE
No
,DirectAddress_Unknown,[email or web address]

## 2025-04-09 ENCOUNTER — APPOINTMENT (OUTPATIENT)
Dept: GYNECOLOGIC ONCOLOGY | Facility: CLINIC | Age: 80
End: 2025-04-09
Payer: MEDICARE

## 2025-04-09 PROBLEM — B37.31 VULVOVAGINAL CANDIDIASIS: Status: ACTIVE | Noted: 2025-04-09 | Resolved: 2025-05-09

## 2025-04-09 PROCEDURE — 99213 OFFICE O/P EST LOW 20 MIN: CPT

## 2025-04-09 PROCEDURE — 99459 PELVIC EXAMINATION: CPT

## 2025-04-09 PROCEDURE — G2211 COMPLEX E/M VISIT ADD ON: CPT

## 2025-06-23 ENCOUNTER — APPOINTMENT (OUTPATIENT)
Dept: UROGYNECOLOGY | Facility: CLINIC | Age: 80
End: 2025-06-23

## (undated) DEVICE — POSITIONER FOAM EGG CRATE ULNAR (2PCS)

## (undated) DEVICE — SOL IRR POUR NS 0.9% 1000ML

## (undated) DEVICE — RUMI KOH-EFFICIENT 4.0CM

## (undated) DEVICE — RUMI TIP LAVENDER 5.1MMX6CM DISP

## (undated) DEVICE — BLANKET WARMER UPPER ADULT

## (undated) DEVICE — PREP BETADINE SPONGE STICKS

## (undated) DEVICE — POSITIONER PINK PAD PIGAZZI SYSTEM

## (undated) DEVICE — SUT DERMABOND 0.7ML

## (undated) DEVICE — BAG TISSUE RETRIEVAL ROBOTIC STERILE 8MM

## (undated) DEVICE — IRRISEPT JET LAVAGE W 0.05 PCT CHG

## (undated) DEVICE — SUT MONOCRYL 4-0 27" PS-2 UNDYED

## (undated) DEVICE — COVER TIP INTUITIVE W INSERTION TOOL

## (undated) DEVICE — XI SEAL UNIV 5- 8 MM

## (undated) DEVICE — ELCTR BOVIE HANDHELD PENCIL ROCKER SWITCH 15FT

## (undated) DEVICE — ELCTR CORD FOOTSWITCH 1PLR LAPSCP 10FT

## (undated) DEVICE — LIGASURE BLUNT TIP NANO CTD 37CM

## (undated) DEVICE — LIGASURE ATLAS 10MM 20CM

## (undated) DEVICE — PACK ROBOTIC

## (undated) DEVICE — PREP CHLORAPREP ORANGE 2PCT 26ML

## (undated) DEVICE — ELCTR GROUNDING PAD ADULT COVIDIEN

## (undated) DEVICE — RUMI TIP BLUE 6.7MM X 8CM DISP

## (undated) DEVICE — WRAP COMPRESSION CALF MED

## (undated) DEVICE — RUMI TIP GREEN 6.7MMX10CM DISP

## (undated) DEVICE — CLEANER FOR ELCTR TIP

## (undated) DEVICE — DEVICE PUREVIEW ACTIVE

## (undated) DEVICE — XI DRAPE ARM

## (undated) DEVICE — TROCAR COVIDIEN VERSAONE OPTICAL BLADELESS 5MM

## (undated) DEVICE — NDL INSUFFLATION SURGINEEDLE 120MM

## (undated) DEVICE — SOL IRR POUR H2O 1000ML

## (undated) DEVICE — RUMI TIP WHITE 6.7MM X 6CM DISP

## (undated) DEVICE — DRAPE ROBOTIC

## (undated) DEVICE — XI DRAPE COLUMN

## (undated) DEVICE — DRSG KERLIX ROLL 4.5"

## (undated) DEVICE — RUMI KOH-EFFICIENT 3.5CM

## (undated) DEVICE — DRAPE TOWEL BLUE STICKY

## (undated) DEVICE — RUMI TIP ORANGE 6.7MMX12CM DISP

## (undated) DEVICE — RUMI KOH-EFFICIENT 2.5CM

## (undated) DEVICE — SUT VLOC 180 0 9" GS-21 GREEN

## (undated) DEVICE — RUMI KOH-EFFICIENT 3.0CM